# Patient Record
Sex: MALE | Race: WHITE | NOT HISPANIC OR LATINO | Employment: OTHER | URBAN - METROPOLITAN AREA
[De-identification: names, ages, dates, MRNs, and addresses within clinical notes are randomized per-mention and may not be internally consistent; named-entity substitution may affect disease eponyms.]

---

## 2017-02-08 ENCOUNTER — ALLSCRIPTS OFFICE VISIT (OUTPATIENT)
Dept: OTHER | Facility: OTHER | Age: 57
End: 2017-02-08

## 2017-02-08 DIAGNOSIS — R10.32 LEFT LOWER QUADRANT PAIN: ICD-10-CM

## 2017-02-08 DIAGNOSIS — R19.7 DIARRHEA: ICD-10-CM

## 2017-03-02 ENCOUNTER — ALLSCRIPTS OFFICE VISIT (OUTPATIENT)
Dept: OTHER | Facility: OTHER | Age: 57
End: 2017-03-02

## 2017-06-08 ENCOUNTER — ALLSCRIPTS OFFICE VISIT (OUTPATIENT)
Dept: OTHER | Facility: OTHER | Age: 57
End: 2017-06-08

## 2017-06-08 DIAGNOSIS — M54.9 DORSALGIA: ICD-10-CM

## 2017-06-08 DIAGNOSIS — R26.9 ABNORMALITY OF GAIT AND MOBILITY: ICD-10-CM

## 2017-06-08 DIAGNOSIS — E03.9 HYPOTHYROIDISM: ICD-10-CM

## 2017-06-08 DIAGNOSIS — E78.00 PURE HYPERCHOLESTEROLEMIA: ICD-10-CM

## 2017-07-16 ENCOUNTER — HOSPITAL ENCOUNTER (EMERGENCY)
Facility: HOSPITAL | Age: 57
Discharge: HOME | End: 2017-07-16
Admitting: EMERGENCY MEDICINE
Payer: COMMERCIAL

## 2017-07-16 VITALS
RESPIRATION RATE: 16 BRPM | WEIGHT: 200 LBS | HEART RATE: 68 BPM | SYSTOLIC BLOOD PRESSURE: 126 MMHG | TEMPERATURE: 99 F | OXYGEN SATURATION: 96 % | DIASTOLIC BLOOD PRESSURE: 74 MMHG

## 2017-07-16 DIAGNOSIS — L08.9 SKIN INFECTION: Primary | ICD-10-CM

## 2017-07-16 PROCEDURE — 99282 EMERGENCY DEPT VISIT SF MDM: CPT

## 2017-07-16 RX ORDER — CLINDAMYCIN HYDROCHLORIDE 150 MG/1
300 CAPSULE ORAL ONCE
Status: COMPLETED | OUTPATIENT
Start: 2017-07-16 | End: 2017-07-16

## 2017-07-16 RX ORDER — CLINDAMYCIN HYDROCHLORIDE 300 MG/1
300 CAPSULE ORAL 4 TIMES DAILY
Qty: 28 CAPSULE | Refills: 0 | Status: SHIPPED | OUTPATIENT
Start: 2017-07-16 | End: 2017-07-23

## 2017-07-16 RX ADMIN — CLINDAMYCIN HYDROCHLORIDE 300 MG: 150 CAPSULE ORAL at 00:31

## 2017-09-21 ENCOUNTER — GENERIC CONVERSION - ENCOUNTER (OUTPATIENT)
Dept: OTHER | Facility: OTHER | Age: 57
End: 2017-09-21

## 2017-10-19 ENCOUNTER — GENERIC CONVERSION - ENCOUNTER (OUTPATIENT)
Dept: OTHER | Facility: OTHER | Age: 57
End: 2017-10-19

## 2017-10-19 DIAGNOSIS — M54.9 DORSALGIA: ICD-10-CM

## 2017-10-19 DIAGNOSIS — R26.9 ABNORMALITY OF GAIT AND MOBILITY: ICD-10-CM

## 2017-10-19 DIAGNOSIS — R55 SYNCOPE AND COLLAPSE: ICD-10-CM

## 2017-10-19 DIAGNOSIS — R42 DIZZINESS AND GIDDINESS: ICD-10-CM

## 2018-01-14 VITALS
WEIGHT: 197.31 LBS | RESPIRATION RATE: 18 BRPM | HEART RATE: 72 BPM | DIASTOLIC BLOOD PRESSURE: 72 MMHG | TEMPERATURE: 96.6 F | BODY MASS INDEX: 31.71 KG/M2 | HEIGHT: 66 IN | SYSTOLIC BLOOD PRESSURE: 108 MMHG | OXYGEN SATURATION: 96 %

## 2018-01-14 VITALS
WEIGHT: 195 LBS | HEART RATE: 94 BPM | HEIGHT: 66 IN | BODY MASS INDEX: 31.34 KG/M2 | DIASTOLIC BLOOD PRESSURE: 82 MMHG | SYSTOLIC BLOOD PRESSURE: 128 MMHG | TEMPERATURE: 98.2 F | RESPIRATION RATE: 18 BRPM | OXYGEN SATURATION: 99 %

## 2018-01-16 NOTE — MISCELLANEOUS
FIRST WARNING     Dear Anya Portillo:     You have had _2_ No-Show appointments at our office (3/2/17 1PM, 9/20/17 2PM  )  A No-Show is defined as any patient who fails to arrive for a scheduled appointment without canceling the appointment prior to the scheduled time  A patient who has more than THREE No-Shows may be dismissed from an [address] [place] [address] practice  Please call in advance to cancel appointments  If you continue to No-Show for scheduled appointments, you may be dismissed from our practice  Thank  You  Usted ha tenido _2_  No-Show citas en nuestra oficina (3/2/17 1PM, 9/20/17 2PM )  Un No-Show se define sowmya cualquier paciente que no llega a leola deloris programada sin cancelar la deloris antes de la hora programada  Un paciente que tiene  más de MISAEL No-Show puede ser despedido de un [facility] práctica  Por favor llame con anticipación para cancelar citas  Si continúa la  "No-Show" para las citas programadas, usted puede ser despedido de nuestra práctica  Breanne

## 2018-01-22 VITALS
HEIGHT: 66 IN | DIASTOLIC BLOOD PRESSURE: 60 MMHG | SYSTOLIC BLOOD PRESSURE: 100 MMHG | BODY MASS INDEX: 31.72 KG/M2 | WEIGHT: 197.38 LBS | HEART RATE: 82 BPM | RESPIRATION RATE: 18 BRPM | OXYGEN SATURATION: 97 % | TEMPERATURE: 98.5 F

## 2018-01-24 NOTE — MISCELLANEOUS
Provider Comments  Provider Comments:   Patient no showed his 1PM appointment today 3/2/17      Signatures   Electronically signed by : GURWINDER Hines; Mar  2 2017  5:27PM EST                       (Author)

## 2018-02-13 NOTE — RESULT NOTES
Verified Results  (1) CBC/PLT/DIFF [id number] 11:54AM Radharafael Gustafsons Order Number: [id number]     Test Name Result Flag Reference   WBC COUNT 4 72 Thousand/uL  4 31-10 16   RBC COUNT 4 96 Million/uL  3 88-5 62   HEMOGLOBIN 15 1 g/dL  12 0-17 0   HEMATOCRIT 42 4 %  36 5-49 3   MCV 86 fL  82-98   MCH 30 4 pg  26 8-34 3   MCHC 35 6 g/dL  31 4-37 4   RDW 13 0 %  11 6-15 1   MPV 10 2 fL  8 9-12 7   PLATELET COUNT 560 Thousands/uL  149-390   nRBC AUTOMATED 0 /100 WBCs     NEUTROPHILS RELATIVE PERCENT 66 %  43-75   LYMPHOCYTES RELATIVE PERCENT 23 %  14-44   MONOCYTES RELATIVE PERCENT 6 %  4-12   EOSINOPHILS RELATIVE PERCENT 5 %  0-6   BASOPHILS RELATIVE PERCENT 0 %  0-1   NEUTROPHILS ABSOLUTE COUNT 3 14 Thousands/?L  1 85-7 62   LYMPHOCYTES ABSOLUTE COUNT 1 06 Thousands/?L  0 60-4 47   MONOCYTES ABSOLUTE COUNT 0 29 Thousand/?L  0 17-1 22   EOSINOPHILS ABSOLUTE COUNT 0 22 Thousand/?L  0 00-0 61   BASOPHILS ABSOLUTE COUNT 0 01 Thousands/?L  0 00-0 10   - Patient Instructions: This bloodwork is non-fasting  Please drink two glasses of water morning of bloodwork  - Patient Instructions: This bloodwork is non-fasting  Please drink two glasses of water morning of bloodwork  (1) COMPREHENSIVE METABOLIC PANEL [id number] 52:41CH Radha Agustin Order Number: [id number]     Test Name Result Flag Reference   GLUCOSE,RANDM 105 mg/dL     If the patient is fasting, the ADA then defines impaired fasting glucose as > 100 mg/dL and diabetes as > or equal to 123 mg/dL     SODIUM 139 mmol/L  136-145   POTASSIUM 3 8 mmol/L  3 5-5 3   CHLORIDE 102 mmol/L  100-108   CARBON DIOXIDE 28 mmol/L  21-32   ANION GAP (CALC) 9 mmol/L  4-13   BLOOD UREA NITROGEN 14 mg/dL  5-25   CREATININE 1 21 mg/dL  0 60-1 30   Standardized to IDMS reference method   CALCIUM 8 9 mg/dL  8 3-10 1   BILI, TOTAL 1 12 mg/dL H 0 20-1 00   ALK PHOSPHATAS 104 U/L     ALT (SGPT) 35 U/L  12-78   AST(SGOT) 23 U/L  5-45   ALBUMIN 4 0 g/dL  3 5-5 0   TOTAL PROTEIN 7 5 g/dL  6 4-8 2   eGFR Non-African American      >60 0 ml/min/1 73sq m   - Patient Instructions: This is a fasting blood test  Water,black tea or black  coffee only after 9:00pm the night before test Drink 2 glasses of water the morning of test   National Kidney Disease Education Program recommendations are as follows:  GFR calculation is accurate only with a steady state creatinine  Chronic Kidney disease less than 60 ml/min/1 73 sq  meters  Kidney failure less than 15 ml/min/1 73 sq  meters  (1) LIPID PANEL, FASTING [id number] 11:54AM Jett Panning Order Number: [id number]     Test Name Result Flag Reference   CHOLESTEROL 128 mg/dL     HDL,DIRECT 29 mg/dL L 40-60   Specimen collection should occur prior to Metamizole administration due to the potential for falsely depressed results  LDL CHOLESTEROL CALCULATED 74 mg/dL  0-100   - Patient Instructions: This is a fasting blood test  Water,black tea or black  coffee only after 9:00pm the night before test   Drink 2 glasses of water the morning of test     - Patient Instructions: This is a fasting blood test  Water,black tea or black  coffee only after 9:00pm the night before test Drink 2 glasses of water the morning of test   Triglyceride:         Normal              <150 mg/dl       Borderline High    150-199 mg/dl       High               200-499 mg/dl       Very High          >499 mg/dl  Cholesterol:         Desirable        <200 mg/dl      Borderline High  200-239 mg/dl      High             >239 mg/dl  HDL Cholesterol:        High    >59 mg/dL      Low     <41 mg/dL  LDL CALCULATED:    This screening LDL is a calculated result  It does not have the accuracy of the Direct Measured LDL in the monitoring of patients with hyperlipidemia and/or statin therapy  Direct Measure LDL (ZHD372) must be ordered separately in these patients     TRIGLYCERIDES 126 mg/dL  <=150   Specimen collection should occur prior to N-Acetylcysteine or Metamizole administration due to the potential for falsely depressed results  (1) URINALYSIS w URINE C/S REFLEX (will reflex a microscopy if leukocytes, occult blood, or nitrites are not within normal limits) 14Sep2016 11:54AM [facility] Order Number: [id number]     Test Name Result Flag Reference   COLOR Yellow     CLARITY Slightly Cloudy     PH UA 8 0  4 5-8 0   LEUKOCYTE ESTERASE UA Negative  Negative   NITRITE UA Negative  Negative   PROTEIN UA 30 (1+) mg/dl A Negative   GLUCOSE UA Negative mg/dl  Negative   KETONES UA Negative mg/dl  Negative   UROBILINOGEN UA 1 0 E U /dl  0 2, 1 0 E U /dl   BILIRUBIN UA Negative  Negative   BLOOD UA Negative  Negative, Trace-Intact   SPECIFIC GRAVITY UA 1 015  1 003-1 030   BACTERIA Occasional /hpf  None Seen, Occasional   EPITHELIAL CELLS None Seen /hpf  None Seen, Occasional   RBC UA 0-1 /hpf A None Seen   WBC UA 0-1 /hpf A None Seen   MUCOUS THREADS Occasional  Occasional, Moderate, Innumerable     (1) HEMOGLOBIN A1C 14Sep2016 11:54AM [facility] Order Number: [id number]     Test Name Result Flag Reference   HEMOGLOBIN A1C 5 6 %  4 2-6 3   EST  AVG  GLUCOSE 114 mg/dl       (1) MICROALBUMIN CREATININE RATIO, RANDOM URINE 14Sep2016 11:54AM [facility] Order Number: [id number]     Test Name Result Flag Reference   MICROALBUMIN/ CREAT R 14 mg/g creatinine  0-30   MICROALBUMIN,URINE 43 8 mg/L H 0 0-20 0   CREATININE URINE 314 0 mg/dL       (1) TSH WITH FT4 REFLEX 14Sep2016 11:54AM [facility] Order Number: [id number]     Test Name Result Flag Reference   TSH 2 820 uIU/mL  0 358-3 740   - Patient Instructions:  This is a fasting blood test  Water,black tea or black  coffee only after 9:00pm the night before test Drink 2 glasses of water the morning of test   Patients undergoing fluorescein dye angiography may retain small amounts of fluorescein in the body for 48-72 hours post procedure  Samples containing fluorescein can produce falsely depressed TSH values  If the patient had this procedure,a specimen should be resubmitted post fluorescein clearance  * XR SPINE LUMBAR MINIMUM 4 VIEWS NON INJURY [id number] 11:10AM Tu Wright Order Number: [id number]     Test Name Result Flag Reference   XR SPINE LUMBAR MINIMUM 4 VIEWS (Report)     LUMBAR SPINE     INDICATION: pain across lower back (worse in right side) radiates down both legs for several weeks     COMPARISON: None     VIEWS: AP, lateral and bilateral oblique projections; 4 images     FINDINGS:     Mild levolumbar scoliosis  Moderate diffuse endplate degenerative changes with anterior bridging osteophytes at L1-2, L3-4 and L4-5  Mild/moderate diffuse facet arthropathy  No acute fracture       IMPRESSION:     Moderate diffuse degenerative changes         Workstation performed: [id number]     Signed by:   Isadora Sheets MD   9/14/16       Plan  Hypercholesterolemia    · Aspirin 81 MG Oral Tablet Delayed Release; TAKE 1 TABLET DAILY   · Atorvastatin Calcium 10 MG Oral Tablet; TAKE 1 TABLET DAILY

## 2018-04-04 ENCOUNTER — OFFICE VISIT (OUTPATIENT)
Dept: FAMILY MEDICINE CLINIC | Facility: CLINIC | Age: 58
End: 2018-04-04
Payer: COMMERCIAL

## 2018-04-04 VITALS
TEMPERATURE: 98.6 F | WEIGHT: 199 LBS | RESPIRATION RATE: 20 BRPM | SYSTOLIC BLOOD PRESSURE: 100 MMHG | BODY MASS INDEX: 31.98 KG/M2 | HEART RATE: 70 BPM | HEIGHT: 66 IN | DIASTOLIC BLOOD PRESSURE: 60 MMHG | OXYGEN SATURATION: 98 %

## 2018-04-04 DIAGNOSIS — R41.3 MEMORY DIFFICULTY: Primary | ICD-10-CM

## 2018-04-04 DIAGNOSIS — M25.561 CHRONIC PAIN OF RIGHT KNEE: ICD-10-CM

## 2018-04-04 DIAGNOSIS — R26.9 GAIT ABNORMALITY: ICD-10-CM

## 2018-04-04 DIAGNOSIS — G89.29 CHRONIC PAIN OF RIGHT KNEE: ICD-10-CM

## 2018-04-04 PROCEDURE — 3008F BODY MASS INDEX DOCD: CPT | Performed by: PHYSICIAN ASSISTANT

## 2018-04-04 PROCEDURE — 99214 OFFICE O/P EST MOD 30 MIN: CPT | Performed by: PHYSICIAN ASSISTANT

## 2018-04-04 RX ORDER — DICLOFENAC SODIUM 25 MG/1
TABLET, DELAYED RELEASE ORAL
Refills: 2 | COMMUNITY
Start: 2018-03-09 | End: 2018-04-04 | Stop reason: SDUPTHER

## 2018-04-04 RX ORDER — GABAPENTIN 300 MG/1
300 CAPSULE ORAL 3 TIMES DAILY
Qty: 90 CAPSULE | Refills: 2 | Status: SHIPPED | OUTPATIENT
Start: 2018-04-04 | End: 2018-10-02 | Stop reason: SDUPTHER

## 2018-04-04 RX ORDER — DICLOFENAC SODIUM 25 MG/1
25 TABLET, DELAYED RELEASE ORAL 3 TIMES DAILY
Qty: 90 TABLET | Refills: 2 | Status: SHIPPED | OUTPATIENT
Start: 2018-04-04 | End: 2018-10-02 | Stop reason: SDUPTHER

## 2018-04-04 RX ORDER — GABAPENTIN 300 MG/1
1 CAPSULE ORAL 3 TIMES DAILY
COMMUNITY
Start: 2016-09-07 | End: 2018-04-04 | Stop reason: SDUPTHER

## 2018-04-04 NOTE — ASSESSMENT & PLAN NOTE
For knee pain, since he was previously on diclofenac, will refill the medication  Discussed stretches and warm compresses to help with symptoms  If there is no improvement in 1 month, make follow-up appointment

## 2018-04-04 NOTE — ASSESSMENT & PLAN NOTE
No improvement in gait since patient was last seen  Never got lab work or imaging completed  Will reorder testing  May refer to PT in the future

## 2018-04-04 NOTE — ASSESSMENT & PLAN NOTE
Discussed with patient possible causes of memory deficiencies  Informed him the importance of getting lab work completed  With concerns of memory difficulties and gait instability will order MRI for further evaluation  Pending results may start on medications or refer to Neurology

## 2018-04-04 NOTE — PROGRESS NOTES
Assessment/Plan:    Gait abnormality  No improvement in gait since patient was last seen  Never got lab work or imaging completed  Will reorder testing  May refer to PT in the future  Memory difficulty  Discussed with patient possible causes of memory deficiencies  Informed him the importance of getting lab work completed  With concerns of memory difficulties and gait instability will order MRI for further evaluation  Pending results may start on medications or refer to Neurology  Chronic pain of right knee  For knee pain, since he was previously on diclofenac, will refill the medication  Discussed stretches and warm compresses to help with symptoms  If there is no improvement in 1 month, make follow-up appointment  Diagnoses and all orders for this visit:    Memory difficulty  -     CBC and differential; Future  -     Comprehensive metabolic panel; Future  -     Lipid panel; Future  -     Urinalysis with reflex to microscopic; Future  -     Vitamin B12; Future  -     Folate; Future  -     TSH, 3rd generation with T4 reflex; Future  -     RPR; Future  -     Sedimentation rate, automated; Future  -     Lyme Antibody Profile with reflex to WB; Future  -     Magnesium; Future  -     MRI brain for memory loss; Future    Gait abnormality  -     MRI brain for memory loss; Future    Chronic pain of right knee  -     diclofenac (VOLTAREN) 25 MG EC tablet; Take 1 tablet (25 mg total) by mouth 3 (three) times a day  -     gabapentin (NEURONTIN) 300 mg capsule; Take 1 capsule (300 mg total) by mouth 3 (three) times a day    Other orders  -     Discontinue: diclofenac (VOLTAREN) 25 MG EC tablet; TAKE 1 TABLET 3 TIMES DAILY BY MOUTH  -     Discontinue: gabapentin (NEURONTIN) 300 mg capsule; Take 1 capsule by mouth 3 (three) times a day          Subjective:      Patient ID: Carolyn Chavez is a 56 y o  male  Patient presents today with a 4 month complaint of increasing memory loss   Patient was seen in the office in September for similar symptoms and blood work and imaging were ordered however patient states that he "forgot" to get them done  He states that he has been forgetting things like where he put his keys and items he was supposed to get from the store but denies forgeting older information about the past  He denies any fever, recent illness, change in medications, alcohol or drug use or incontinence  At last visit, patient admitted to a "staggering" walk and said he feels off balance when walking and states that he continues to experience these symptoms  He also admits at this visit to some right sided joint pain in both the right shoulder and right knee  He has not tried anything for this pain  He denies swelling of his joints, facial droop, unilateral weakness or numbness, slurred speech or chest pain  Patient denies any other concerns at this time  The following portions of the patient's history were reviewed and updated as appropriate:   He  has a past medical history of Arthritis; Chronic pain; CVA (cerebral vascular accident) ([facility] [address] ); Hyperlipidemia; Malignant neoplasm of lung ([facility] [address] ); Myocardial infarction; and Psychiatric disorder  He   Patient Active Problem List    Diagnosis Date Noted    Memory difficulty 04/04/2018    Chronic pain of right knee 04/04/2018    Gait abnormality 09/22/2016     He  has a past surgical history that includes Elbow surgery (Right) and Gallbladder surgery  His family history includes Other in his family and father  He  reports that he has quit smoking  He has never used smokeless tobacco  He reports that he does not drink alcohol or use drugs    Current Outpatient Prescriptions   Medication Sig Dispense Refill    gabapentin (NEURONTIN) 300 mg capsule Take 1 capsule (300 mg total) by mouth 3 (three) times a day 90 capsule 2    cyclobenzaprine (FLEXERIL) 10 mg tablet Take 1 tablet by mouth 3 (three) times a day as needed for muscle spasms 3 tablet 0    diclofenac (VOLTAREN) 25 MG EC tablet Take 1 tablet (25 mg total) by mouth 3 (three) times a day 90 tablet 2     No current facility-administered medications for this visit  He is allergic to sulfamethoxazole-trimethoprim and amoxicillin       Review of Systems   Constitutional: Negative for chills, fatigue and fever  Respiratory: Positive for cough (Dry Chronic )  Negative for shortness of breath  Cardiovascular: Negative for chest pain and palpitations  Gastrointestinal: Negative for abdominal pain, blood in stool, constipation, nausea and vomiting  Genitourinary: Negative for difficulty urinating, dysuria and frequency  Denied incontinence    Musculoskeletal: Positive for arthralgias (Right shoulder and right knee)  Skin: Negative for color change  Neurological: Negative for tremors, syncope, speech difficulty, weakness, numbness and headaches  Psychiatric/Behavioral: Positive for decreased concentration  Objective:      /60   Pulse 70   Temp 98 6 °F (37 °C) (Tympanic)   Resp 20   Ht 5' 6" (1 676 m)   Wt 90 3 kg (199 lb)   SpO2 98%   BMI 32 12 kg/m²          Physical Exam   Constitutional: He is oriented to person, place, and time  He appears well-developed and well-nourished  No distress  HENT:   Head: Normocephalic and atraumatic  Mouth/Throat: Oropharynx is clear and moist    Eyes: Conjunctivae are normal  Pupils are equal, round, and reactive to light  Neck: Normal range of motion  Neck supple  Cardiovascular: Normal rate, regular rhythm and normal heart sounds  Exam reveals no friction rub  No murmur heard  Pulmonary/Chest: Effort normal and breath sounds normal  No respiratory distress  He has no wheezes  Abdominal: Soft  Bowel sounds are normal  He exhibits no distension  There is no tenderness  Musculoskeletal: Normal range of motion  Lymphadenopathy:     He has no cervical adenopathy     Neurological: He is alert and oriented to person, place, and time  Gait (walks with cane) abnormal    Skin: Skin is warm and dry  He is not diaphoretic  Psychiatric: He has a normal mood and affect   His behavior is normal  Thought content normal    Patient was given 3 words to remember at beginning of exam and was able to remember 0/3 with prompts at conclusion of exam

## 2018-04-05 LAB
ABSOL LYMPHOCYTES (HISTORICAL): 1 K/UL (ref 0.5–4)
ALBUMIN SERPL BCP-MCNC: 4.8 G/DL (ref 3–5.2)
ALP SERPL-CCNC: 89 U/L (ref 43–122)
ALT SERPL W P-5'-P-CCNC: 61 U/L (ref 9–52)
ANION GAP SERPL CALCULATED.3IONS-SCNC: 12 MMOL/L (ref 5–14)
AST SERPL W P-5'-P-CCNC: 37 U/L (ref 17–59)
BASOPHILS # BLD AUTO: 0 K/UL (ref 0–0.1)
BASOPHILS # BLD AUTO: 1 % (ref 0–1)
BILIRUB SERPL-MCNC: 1.2 MG/DL
BILIRUB UR QL STRIP: NEGATIVE MG/DL
BUN SERPL-MCNC: 15 MG/DL (ref 5–25)
CALCIUM SERPL-MCNC: 9.8 MG/DL (ref 8.4–10.2)
CHLORIDE SERPL-SCNC: 101 MEQ/L (ref 97–108)
CHOLEST SERPL-MCNC: 135 MG/DL
CHOLEST/HDLC SERPL: 3.4 {RATIO}
CLARITY UR: CLEAR
CO2 SERPL-SCNC: 29 MMOL/L (ref 22–30)
COLOR UR: YELLOW
CREATINE, SERUM (HISTORICAL): 0.95 MG/DL (ref 0.7–1.5)
DEPRECATED RDW RBC AUTO: 13.3 %
EGFR (HISTORICAL): >60 ML/MIN/1.73 M2
EOSINOPHIL # BLD AUTO: 0.2 K/UL (ref 0–0.4)
EOSINOPHIL NFR BLD AUTO: 4 % (ref 0–6)
ERYTHROCYTE SEDIMENTATION RATE (HISTORICAL): 3 MM (ref 1–20)
FOLATE SERPL-MCNC: 8.2 NG/ML
GLUCOSE FASTING (HISTORICAL): 143 MG/DL (ref 70–99)
GLUCOSE UR STRIP-MCNC: NEGATIVE MG/DL
HCT VFR BLD AUTO: 45.6 % (ref 41–53)
HDLC SERPL-MCNC: 40 MG/DL
HGB BLD-MCNC: 15.8 G/DL (ref 13.5–17.5)
HGB UR QL STRIP.AUTO: NEGATIVE
KETONES UR STRIP-MCNC: 5 MG/DL
LDL/HDL RATIO (HISTORICAL): 1.9
LDLC SERPL CALC-MCNC: 74 MG/DL
LEUKOCYTE ESTERASE UR QL STRIP: NEGATIVE
LYMPHOCYTES NFR BLD AUTO: 24 % (ref 25–45)
MAGNESIUM SERPL-MCNC: 1.9 MG/DL (ref 1.6–2.3)
MCH RBC QN AUTO: 30 PG (ref 26–34)
MCHC RBC AUTO-ENTMCNC: 34.6 % (ref 31–36)
MCV RBC AUTO: 87 FL (ref 80–100)
MONOCYTES # BLD AUTO: 0.3 K/UL (ref 0.2–0.9)
MONOCYTES NFR BLD AUTO: 7 % (ref 1–10)
NEUTROPHILS ABS COUNT (HISTORICAL): 2.6 K/UL (ref 1.8–7.8)
NEUTS SEG NFR BLD AUTO: 64 % (ref 45–65)
NITRITE UR QL STRIP: NEGATIVE
PH UR STRIP.AUTO: 6 [PH] (ref 4.5–8)
PLATELET # BLD AUTO: 165 K/MCL (ref 150–450)
POTASSIUM SERPL-SCNC: 4 MEQ/L (ref 3.6–5)
PROT UR STRIP-MCNC: NEGATIVE MG/DL
RBC # BLD AUTO: 5.26 M/MCL (ref 4.5–5.9)
RPR SCREEN (HISTORICAL): NORMAL
SODIUM SERPL-SCNC: 142 MEQ/L (ref 137–147)
SP GR UR STRIP.AUTO: 1.01 (ref 1–1.04)
TOTAL PROTEIN (HISTORICAL): 7.7 G/DL (ref 5.9–8.4)
TRIGL SERPL-MCNC: 103 MG/DL
TSH SERPL DL<=0.05 MIU/L-ACNC: 2.77 UIU/ML (ref 0.47–4.68)
UROBILINOGEN UR QL STRIP.AUTO: NEGATIVE MG/DL (ref 0–1)
VIT B12 SERPL-MCNC: 330 PG/ML (ref 239–931)
VLDLC SERPL CALC-MCNC: 21 MG/DL (ref 0–40)
WBC # BLD AUTO: 4.1 K/MCL (ref 4.5–11)

## 2018-04-06 LAB — B BURGDORFERI AB,IGM/WB (HISTORICAL): 0.4

## 2018-04-10 ENCOUNTER — TELEPHONE (OUTPATIENT)
Dept: NEUROLOGY | Facility: CLINIC | Age: 58
End: 2018-04-10

## 2018-04-10 DIAGNOSIS — R41.3 MEMORY DIFFICULTY: Primary | ICD-10-CM

## 2018-04-18 ENCOUNTER — TELEPHONE (OUTPATIENT)
Dept: FAMILY MEDICINE CLINIC | Facility: CLINIC | Age: 58
End: 2018-04-18

## 2018-04-18 DIAGNOSIS — R73.01 ELEVATED FASTING GLUCOSE: Primary | ICD-10-CM

## 2018-04-18 NOTE — TELEPHONE ENCOUNTER
----- Message from Fuentes Alfonso PA-C sent at 4/18/2018  7:46 AM EDT -----  Received paper copy of patient's lab results  Lab work was normal with the exception of fasting glucose which was at 143  This would have concerns for diabetes  Will order A1c for further evaluation

## 2018-09-12 ENCOUNTER — APPOINTMENT (OUTPATIENT)
Dept: LAB | Facility: HOSPITAL | Age: 58
End: 2018-09-12
Payer: COMMERCIAL

## 2018-09-12 ENCOUNTER — TRANSCRIBE ORDERS (OUTPATIENT)
Dept: ADMINISTRATIVE | Facility: HOSPITAL | Age: 58
End: 2018-09-12

## 2018-09-12 DIAGNOSIS — F25.0 SCHIZOAFFECTIVE DISORDER, BIPOLAR TYPE (HCC): ICD-10-CM

## 2018-09-12 DIAGNOSIS — Z79.899 NEED FOR PROPHYLACTIC CHEMOTHERAPY: Primary | ICD-10-CM

## 2018-09-12 DIAGNOSIS — Z79.899 NEED FOR PROPHYLACTIC CHEMOTHERAPY: ICD-10-CM

## 2018-09-12 LAB
ALBUMIN SERPL BCP-MCNC: 3.9 G/DL (ref 3–5.2)
ALP SERPL-CCNC: 68 U/L (ref 43–122)
ALT SERPL W P-5'-P-CCNC: 57 U/L (ref 9–52)
ANION GAP SERPL CALCULATED.3IONS-SCNC: 8 MMOL/L (ref 5–14)
AST SERPL W P-5'-P-CCNC: 46 U/L (ref 17–59)
BASOPHILS # BLD AUTO: 0 THOUSANDS/ΜL (ref 0–0.1)
BASOPHILS NFR BLD AUTO: 1 % (ref 0–1)
BILIRUB SERPL-MCNC: 0.8 MG/DL
BUN SERPL-MCNC: 7 MG/DL (ref 5–25)
CALCIUM SERPL-MCNC: 8.7 MG/DL (ref 8.4–10.2)
CHLORIDE SERPL-SCNC: 103 MMOL/L (ref 97–108)
CHOLEST SERPL-MCNC: 104 MG/DL
CO2 SERPL-SCNC: 30 MMOL/L (ref 22–30)
CREAT SERPL-MCNC: 0.94 MG/DL (ref 0.7–1.5)
EOSINOPHIL # BLD AUTO: 0.3 THOUSAND/ΜL (ref 0–0.4)
EOSINOPHIL NFR BLD AUTO: 7 % (ref 0–6)
ERYTHROCYTE [DISTWIDTH] IN BLOOD BY AUTOMATED COUNT: 12.7 %
GFR SERPL CREATININE-BSD FRML MDRD: 90 ML/MIN/1.73SQ M
GLUCOSE P FAST SERPL-MCNC: 146 MG/DL (ref 70–99)
HCT VFR BLD AUTO: 42.1 % (ref 41–53)
HDLC SERPL-MCNC: 23 MG/DL (ref 40–59)
HGB BLD-MCNC: 14.1 G/DL (ref 13.5–17.5)
LDLC SERPL CALC-MCNC: 49 MG/DL
LYMPHOCYTES # BLD AUTO: 1 THOUSANDS/ΜL (ref 0.5–4)
LYMPHOCYTES NFR BLD AUTO: 28 % (ref 20–50)
MCH RBC QN AUTO: 30.2 PG (ref 26–34)
MCHC RBC AUTO-ENTMCNC: 33.4 G/DL (ref 31–36)
MCV RBC AUTO: 90 FL (ref 80–100)
MONOCYTES # BLD AUTO: 0.2 THOUSAND/ΜL (ref 0.2–0.9)
MONOCYTES NFR BLD AUTO: 6 % (ref 1–10)
NEUTROPHILS # BLD AUTO: 2 THOUSANDS/ΜL (ref 1.8–7.8)
NEUTS SEG NFR BLD AUTO: 58 % (ref 45–65)
NONHDLC SERPL-MCNC: 81 MG/DL
PLATELET # BLD AUTO: 163 THOUSANDS/UL (ref 150–450)
PMV BLD AUTO: 8.1 FL (ref 8.9–12.7)
POTASSIUM SERPL-SCNC: 4.2 MMOL/L (ref 3.6–5)
PROT SERPL-MCNC: 7.1 G/DL (ref 5.9–8.4)
RBC # BLD AUTO: 4.66 MILLION/UL (ref 4.5–5.9)
SODIUM SERPL-SCNC: 141 MMOL/L (ref 137–147)
TRIGL SERPL-MCNC: 158 MG/DL
TSH SERPL DL<=0.05 MIU/L-ACNC: 2.43 UIU/ML (ref 0.47–4.68)
WBC # BLD AUTO: 3.5 THOUSAND/UL (ref 4.5–11)

## 2018-09-12 PROCEDURE — 80061 LIPID PANEL: CPT

## 2018-09-12 PROCEDURE — 85025 COMPLETE CBC W/AUTO DIFF WBC: CPT

## 2018-09-12 PROCEDURE — 80053 COMPREHEN METABOLIC PANEL: CPT

## 2018-09-12 PROCEDURE — 84443 ASSAY THYROID STIM HORMONE: CPT

## 2018-09-12 PROCEDURE — 36415 COLL VENOUS BLD VENIPUNCTURE: CPT

## 2018-10-02 ENCOUNTER — OFFICE VISIT (OUTPATIENT)
Dept: FAMILY MEDICINE CLINIC | Facility: CLINIC | Age: 58
End: 2018-10-02
Payer: COMMERCIAL

## 2018-10-02 VITALS
HEIGHT: 66 IN | SYSTOLIC BLOOD PRESSURE: 110 MMHG | WEIGHT: 196 LBS | DIASTOLIC BLOOD PRESSURE: 68 MMHG | TEMPERATURE: 97.2 F | OXYGEN SATURATION: 98 % | RESPIRATION RATE: 20 BRPM | BODY MASS INDEX: 31.5 KG/M2 | HEART RATE: 64 BPM

## 2018-10-02 DIAGNOSIS — E11.9 TYPE 2 DIABETES MELLITUS WITHOUT COMPLICATION, WITHOUT LONG-TERM CURRENT USE OF INSULIN (HCC): ICD-10-CM

## 2018-10-02 DIAGNOSIS — G89.29 CHRONIC PAIN OF RIGHT KNEE: ICD-10-CM

## 2018-10-02 DIAGNOSIS — M54.42 CHRONIC BILATERAL LOW BACK PAIN WITH BILATERAL SCIATICA: ICD-10-CM

## 2018-10-02 DIAGNOSIS — G89.29 CHRONIC BILATERAL LOW BACK PAIN WITH BILATERAL SCIATICA: ICD-10-CM

## 2018-10-02 DIAGNOSIS — Z86.73 HISTORY OF CVA (CEREBROVASCULAR ACCIDENT): ICD-10-CM

## 2018-10-02 DIAGNOSIS — Z12.11 SCREENING FOR COLON CANCER: Primary | ICD-10-CM

## 2018-10-02 DIAGNOSIS — R26.9 GAIT ABNORMALITY: ICD-10-CM

## 2018-10-02 DIAGNOSIS — M25.561 CHRONIC PAIN OF RIGHT KNEE: ICD-10-CM

## 2018-10-02 DIAGNOSIS — R41.3 MEMORY DIFFICULTY: ICD-10-CM

## 2018-10-02 DIAGNOSIS — M54.41 CHRONIC BILATERAL LOW BACK PAIN WITH BILATERAL SCIATICA: ICD-10-CM

## 2018-10-02 LAB — SL AMB POCT HEMOGLOBIN AIC: 7.1

## 2018-10-02 PROCEDURE — 3045F PR MOST RECENT HEMOGLOBIN A1C LEVEL 7.0-9.0%: CPT | Performed by: PHYSICIAN ASSISTANT

## 2018-10-02 PROCEDURE — 83036 HEMOGLOBIN GLYCOSYLATED A1C: CPT | Performed by: PHYSICIAN ASSISTANT

## 2018-10-02 PROCEDURE — 99213 OFFICE O/P EST LOW 20 MIN: CPT | Performed by: PHYSICIAN ASSISTANT

## 2018-10-02 PROCEDURE — 99051 MED SERV EVE/WKEND/HOLIDAY: CPT | Performed by: PHYSICIAN ASSISTANT

## 2018-10-02 RX ORDER — GABAPENTIN 300 MG/1
300 CAPSULE ORAL 3 TIMES DAILY
Qty: 90 CAPSULE | Refills: 2 | Status: SHIPPED | OUTPATIENT
Start: 2018-10-02 | End: 2018-12-28 | Stop reason: SDUPTHER

## 2018-10-02 RX ORDER — CYCLOBENZAPRINE HCL 10 MG
10 TABLET ORAL 3 TIMES DAILY PRN
Qty: 90 TABLET | Refills: 2 | Status: SHIPPED | OUTPATIENT
Start: 2018-10-02 | End: 2018-12-28 | Stop reason: SDUPTHER

## 2018-10-02 RX ORDER — DICLOFENAC SODIUM 25 MG/1
25 TABLET, DELAYED RELEASE ORAL 3 TIMES DAILY
Qty: 90 TABLET | Refills: 2 | Status: SHIPPED | OUTPATIENT
Start: 2018-10-02 | End: 2018-12-28 | Stop reason: SDUPTHER

## 2018-10-02 NOTE — PROGRESS NOTES
Assessment/Plan:    Type 2 diabetes mellitus without complication, without long-term current use of insulin (HCC)  Lab Results   Component Value Date    HGBA1C 7 1 10/02/2018       No results for input(s): POCGLU in the last 72 hours  Blood Sugar Average: Last 72 hrs:   A1c was obtained today which was 7 1%  At this time will restart patient on metformin 500 mg twice a day  Discussed with patient the importance of diet and exercise to control diabetes, and if able to we may be able to decrease medication once again  Memory difficulty  Lab work came back normal with the exception of elevated glucose which was related to diabetes  MRI was never completed  Patient does have a history of previous stroke  Will refer once again to Neurology with concerns of memory difficulty  Chronic bilateral low back pain with bilateral sciatica  Stable  Will continue with diclofenac, gabapentin, Flexeril  Diagnoses and all orders for this visit:    Screening for colon cancer    Memory difficulty  -     Ambulatory referral to Neurology; Future    Gait abnormality  -     Ambulatory referral to Neurology; Future    Type 2 diabetes mellitus without complication, without long-term current use of insulin (HCC)  -     POCT hemoglobin A1c  -     metFORMIN (GLUCOPHAGE) 500 mg tablet; Take 1 tablet (500 mg total) by mouth 2 (two) times a day with meals    History of CVA (cerebrovascular accident)    Chronic pain of right knee  -     gabapentin (NEURONTIN) 300 mg capsule; Take 1 capsule (300 mg total) by mouth 3 (three) times a day  -     diclofenac (VOLTAREN) 25 MG EC tablet; Take 1 tablet (25 mg total) by mouth 3 (three) times a day    Chronic bilateral low back pain with bilateral sciatica  -     cyclobenzaprine (FLEXERIL) 10 mg tablet; Take 1 tablet (10 mg total) by mouth 3 (three) times a day as needed for muscle spasms    Other orders  -     Cancel: Ambulatory referral to Gastroenterology;  Future          Subjective: Patient ID: Dee Clark is a 66 y o  male  43-year-old male presenting for routine follow-up  Patient states that he was told that the something wrong with his recent lab work  Was told that he had elevated blood sugar  Patient has been diagnosed with diabetes in the past   States that he was on medication, but was taken off medication once his A1c had improved  Has not been really paying attention to his diet to control his diabetes  Currently denies any headaches, dizziness, blurry vision,, chest pain, palpitations, numbness tingling hands or feet  Patient is also following up for chronic back pain  Has been started on diclofenac, gabapentin, Flexeril  States that the medications has been helping with his back pain  Pain is mainly located in the lumbar spine  Does have occasional episodes of radiculopathy, which she states may happen once or twice a week  Denies any numbness, tingling, weakness in lower extremities  Has not had any recent falls  Patient also has concerns with memory difficulties  Did have lab work completed, but never got the MRI completed  Patient never made a follow-up appointment with Neurology  States he does  About important dates, appointments, or where he placed things  No concerns with unable to recognize faces  Has not had problems with erections  The following portions of the patient's history were reviewed and updated as appropriate:   He  has a past medical history of Arthritis; Chronic pain; CVA (cerebral vascular accident) ([facility] [address] ); Hyperlipidemia; Malignant neoplasm of lung ([facility] [address] ); Myocardial infarction [facility]); and Psychiatric disorder    He   Patient Active Problem List    Diagnosis Date Noted    Type 2 diabetes mellitus without complication, without long-term current use of insulin ([facility] [address] ) 10/02/2018    Chronic bilateral low back pain with bilateral sciatica 10/02/2018    Memory difficulty 04/04/2018    Chronic pain of right knee 04/04/2018    Gait abnormality 09/22/2016     He  has a past surgical history that includes Elbow surgery (Right) and Gallbladder surgery  His family history includes Other in his family and father  He  reports that he has quit smoking  He has never used smokeless tobacco  He reports that he does not drink alcohol or use drugs  Current Outpatient Prescriptions   Medication Sig Dispense Refill    cyclobenzaprine (FLEXERIL) 10 mg tablet Take 1 tablet (10 mg total) by mouth 3 (three) times a day as needed for muscle spasms 90 tablet 2    diclofenac (VOLTAREN) 25 MG EC tablet Take 1 tablet (25 mg total) by mouth 3 (three) times a day 90 tablet 2    gabapentin (NEURONTIN) 300 mg capsule Take 1 capsule (300 mg total) by mouth 3 (three) times a day 90 capsule 2    metFORMIN (GLUCOPHAGE) 500 mg tablet Take 1 tablet (500 mg total) by mouth 2 (two) times a day with meals 180 tablet 0     No current facility-administered medications for this visit  He is allergic to sulfamethoxazole-trimethoprim and amoxicillin       Review of Systems   Constitutional: Negative for activity change, appetite change, chills, fatigue, fever and unexpected weight change  Eyes: Negative for pain and visual disturbance  Respiratory: Negative for chest tightness and shortness of breath  Cardiovascular: Negative for chest pain, palpitations and leg swelling  Gastrointestinal: Negative for abdominal pain, diarrhea, nausea and vomiting  Endocrine: Negative for polydipsia, polyphagia and polyuria  Genitourinary: Negative for frequency and urgency  Musculoskeletal: Positive for back pain and gait problem  Skin: Negative for rash and wound  Neurological: Negative for dizziness, syncope, weakness, numbness and headaches  Psychiatric/Behavioral: Negative for dysphoric mood and sleep disturbance  The patient is not nervous/anxious            Objective:      /68   Pulse 64   Temp (!) 97 2 °F (36 2 °C) (Tympanic)   Resp 20   Ht 5' 6" (1 676 m)   Wt 88 9 kg (196 lb)   SpO2 98%   BMI 31 64 kg/m²          Physical Exam   Constitutional: He is oriented to person, place, and time  He appears well-developed and well-nourished  No distress  Neck: Normal range of motion  Neck supple  Cardiovascular: Normal rate, regular rhythm and normal heart sounds  Exam reveals no gallop and no friction rub  Pulses are no weak pulses  No murmur heard  Pulses:       Dorsalis pedis pulses are 2+ on the right side, and 2+ on the left side  Posterior tibial pulses are 2+ on the right side, and 2+ on the left side  Pulmonary/Chest: Effort normal and breath sounds normal  No respiratory distress  He has no wheezes  He has no rales  Abdominal: Soft  Bowel sounds are normal  He exhibits no distension and no mass  There is no tenderness  There is no rebound and no guarding  Musculoskeletal:        Lumbar back: He exhibits decreased range of motion  He exhibits no bony tenderness and no spasm  Feet:   Right Foot:   Skin Integrity: Positive for callus  Negative for ulcer, skin breakdown, erythema, warmth or dry skin  Left Foot:   Skin Integrity: Positive for callus  Negative for ulcer, skin breakdown, erythema, warmth or dry skin  Lymphadenopathy:     He has no cervical adenopathy  Neurological: He is alert and oriented to person, place, and time  No cranial nerve deficit  Gait ( uses cane for ambulation) abnormal    Skin: He is not diaphoretic  Psychiatric: He has a normal mood and affect  His behavior is normal  Thought content normal    Nursing note and vitals reviewed  Patient's shoes and socks removed  Right Foot/Ankle   Right Foot Inspection  Skin Exam: skin normal, skin intact, callus and callus no dry skin, no warmth, no erythema, no maceration, no abnormal color, no pre-ulcer and no ulcer                          Toe Exam: ROM and strength within normal limitsno tenderness, erythema and  no right toe deformity  Sensory Proprioception: intact   Monofilament testing: intact  Vascular  Capillary refills: < 3 seconds  The right DP pulse is 2+  The right PT pulse is 2+  Left Foot/Ankle  Left Foot Inspection  Skin Exam: skin normal, skin intact and callusno dry skin, no warmth, no erythema, no maceration, normal color, no pre-ulcer and no ulcer                         Toe Exam: ROM and strength within normal limitsno tenderness, no erythema and no left toe deformity                   Sensory     Proprioception: intact  Monofilament: intact  Vascular  Capillary refills: < 3 seconds  The left DP pulse is 2+  The left PT pulse is 2+  Assign Risk Category:  No deformity present; No loss of protective sensation;  No weak pulses       Risk: 0

## 2018-10-02 NOTE — ASSESSMENT & PLAN NOTE
Lab Results   Component Value Date    HGBA1C 7 1 10/02/2018       No results for input(s): POCGLU in the last 72 hours  Blood Sugar Average: Last 72 hrs:   A1c was obtained today which was 7 1%  At this time will restart patient on metformin 500 mg twice a day  Discussed with patient the importance of diet and exercise to control diabetes, and if able to we may be able to decrease medication once again

## 2018-10-02 NOTE — ASSESSMENT & PLAN NOTE
Lab work came back normal with the exception of elevated glucose which was related to diabetes  MRI was never completed  Patient does have a history of previous stroke  Will refer once again to Neurology with concerns of memory difficulty

## 2018-10-03 ENCOUNTER — TELEPHONE (OUTPATIENT)
Dept: NEUROLOGY | Facility: CLINIC | Age: 58
End: 2018-10-03

## 2018-11-28 ENCOUNTER — TELEPHONE (OUTPATIENT)
Dept: FAMILY MEDICINE CLINIC | Facility: CLINIC | Age: 58
End: 2018-11-28

## 2018-11-28 DIAGNOSIS — R26.9 GAIT ABNORMALITY: Primary | ICD-10-CM

## 2018-11-28 DIAGNOSIS — R41.3 MEMORY DIFFICULTY: ICD-10-CM

## 2018-11-28 NOTE — TELEPHONE ENCOUNTER
requesting a order for [facility] on [facility]      pts has an appt Friday 11/30/18      R41 3 MEMORY DIFFICULTY   R26 9 GAIT ABNORMALITY

## 2018-11-29 ENCOUNTER — APPOINTMENT (EMERGENCY)
Dept: RADIOLOGY | Facility: HOSPITAL | Age: 58
End: 2018-11-29
Payer: COMMERCIAL

## 2018-11-29 ENCOUNTER — APPOINTMENT (EMERGENCY)
Dept: CT IMAGING | Facility: HOSPITAL | Age: 58
End: 2018-11-29
Payer: COMMERCIAL

## 2018-11-29 ENCOUNTER — HOSPITAL ENCOUNTER (EMERGENCY)
Facility: HOSPITAL | Age: 58
Discharge: HOME | End: 2018-11-29
Attending: EMERGENCY MEDICINE | Admitting: EMERGENCY MEDICINE
Payer: COMMERCIAL

## 2018-11-29 VITALS
TEMPERATURE: 98.2 F | WEIGHT: 195.99 LBS | OXYGEN SATURATION: 98 % | HEART RATE: 71 BPM | SYSTOLIC BLOOD PRESSURE: 131 MMHG | RESPIRATION RATE: 18 BRPM | BODY MASS INDEX: 31.63 KG/M2 | DIASTOLIC BLOOD PRESSURE: 66 MMHG

## 2018-11-29 DIAGNOSIS — R19.8 SENSATION OF FOREIGN BODY IN ESOPHAGUS: ICD-10-CM

## 2018-11-29 DIAGNOSIS — J02.9 SORE THROAT: Primary | ICD-10-CM

## 2018-11-29 PROCEDURE — 71046 X-RAY EXAM CHEST 2 VIEWS: CPT

## 2018-11-29 PROCEDURE — 99284 EMERGENCY DEPT VISIT MOD MDM: CPT

## 2018-11-29 PROCEDURE — 70360 X-RAY EXAM OF NECK: CPT

## 2018-11-29 PROCEDURE — 70490 CT SOFT TISSUE NECK W/O DYE: CPT

## 2018-11-29 PROCEDURE — 74018 RADEX ABDOMEN 1 VIEW: CPT

## 2018-11-30 NOTE — ED ATTENDING ATTESTATION
Jakie Collet, DO, saw and evaluated the patient  I have discussed the patient with the resident/non-physician practitioner and agree with the resident's/non-physician practitioner's findings, Plan of Care, and MDM as documented in the resident's/non-physician practitioner's note, except where noted  All available labs and Radiology studies were reviewed  At this point I agree with the current assessment done in the Emergency Department  I have conducted an independent evaluation of this patient a history and physical is as follows:    51-year-old male presents for evaluation a foreign body sensation in his neck  The patient states that he was eating some chicken felt like a bone got stuck in the upper portion his neck near his larynx  States that his symptoms are worse with swelling  Denies any trouble breathing  On examination:  The patient is awake, alert and oriented  HEENT: Normocephalic/atraumatic  External examination of the ears is unremarkable  Pupils are equal round and reactive to light, there is no conjunctival injection or scleral icterus noted  Nares are patent without rhinorrhea  The oropharynx is moist without injection  The neck is supple  Lungs: Clear to auscultation bilaterally  Heart: Regular without murmurs rubs or gallops  Abdomen: Soft and nontender  There are positive bowel sounds  there is no rebound or guarding  Musculoskeletal: Normal range of motion with grossly normal strength  Neuro: Cranial nerves II through XII grossly intact  Nonfocal exam  Skin: No rash noted  Psych: Mood and affect normal    The plan is for imaging and possible GI evaluation    XR abdomen 1 view kub   ED Interpretation   No identifiable foreign body as interpreted by myself  Final Result      Nonobstructive bowel gas pattern  No radiopaque foreign body                 Workstation performed: [id number]         XR chest 2 views   ED Interpretation   No identifiable foreign body as interpreted by myself  Final Result      No acute cardiopulmonary disease  Workstation performed: [id number]         XR neck soft tissue   ED Interpretation   No identifiable foreign body as interpreted by myself  Final Result      No radiopaque foreign body  Workstation performed: [id number]         CT soft tissue neck wo contrast   Final Result      No acute CT findings  Workstation performed: [id number]             Imaging results reviewed without evidence esophageal foreign body or tracheal foreign body  The patient will be discharged home for outpatient follow-up with return precautions        Critical Care Time  CritCare Time    Procedures

## 2018-11-30 NOTE — ED PROVIDER NOTES
History  Chief Complaint   Patient presents with    Foreign Body in Throat     eating chicken, believes a piece of chicken bone is lodged in throat  This is a 74-year-old male with a history of hypertension, hyperlipidemia, hypothyroidism who presents after swallowing a chicken bone  The patient states that he was eating chicken and felt a bone in his mouth  States that he accidentally swallowed  Feels a foreign body sensation in his throat  Denies any difficulty swallowing  Only pain when he swallows  No difficulty breathing or stridor  Denies fever/chills, nausea/vomiting, lightheadedness/dizziness, numbness/weakness, headache, change in vision, URI symptoms, neck pain, chest pain, palpitations, shortness of breath, cough, back pain, flank pain, abdominal pain, diarrhea, hematochezia, melena, dysuria, hematuria  Prior to Admission Medications   Prescriptions Last Dose Informant Patient Reported? Taking?    FLUoxetine (PROZAC) 20 mg capsule   Yes No   Sig: take 1 capsule (20MG)  by oral route QID   Levothyroxine Sodium (TIROSINT) 50 MCG CAPS   Yes No   Sig: every 24 hours   aspirin (ECOTRIN LOW STRENGTH) 81 mg EC tablet   Yes No   Sig: Take 1 tablet by mouth daily   aspirin 325 mg tablet   Yes No   Sig: every 24 hours   atorvastatin (LIPITOR) 10 mg tablet   Yes No   Sig: Take 1 tablet by mouth daily   baclofen 10 mg tablet   Yes No   Sig: Take by mouth   cyclobenzaprine (FLEXERIL) 10 mg tablet   No No   Sig: Take 1 tablet (10 mg total) by mouth 3 (three) times a day as needed for muscle spasms   diclofenac (VOLTAREN) 25 MG EC tablet   No No   Sig: Take 1 tablet (25 mg total) by mouth 3 (three) times a day   diphenhydrAMINE (BENADRYL) 25 mg capsule   Yes No   Sig: take 1 Capsule (25MG)  by oral mouth bid   gabapentin (NEURONTIN) 300 mg capsule   No No   Sig: Take 1 capsule (300 mg total) by mouth 3 (three) times a day   glucose blood (ACCU-CHEK NATI) test strip   Yes No   Sig: by In Vitro route daily   hydrOXYzine HCL (ATARAX) 50 mg tablet   Yes No   Sig: Take 50 mg by mouth every 6 (six) hours   hydrochlorothiazide (HYDRODIURIL) 12 5 mg tablet   Yes No   Sig: every 24 hours   lisinopril (PRINIVIL) 5 mg tablet   Yes No   Sig: every 24 hours   loratadine (CLARITIN) 10 mg tablet   Yes No   Sig: every 24 hours   metFORMIN (GLUCOPHAGE) 500 mg tablet   No No   Sig: Take 1 tablet (500 mg total) by mouth 2 (two) times a day with meals   rOPINIRole (REQUIP) 0 25 mg tablet   Yes No   Sig: Take 0 25 mg by mouth   sertraline (ZOLOFT) 100 mg tablet   Yes No   Sig: Take 1 tablet by mouth daily   traMADol (ULTRAM) 50 mg tablet   Yes No   Sig: Take 1 tablet by mouth every 8 (eight) hours as needed   traZODone (DESYREL) 50 mg tablet   Yes No   Sig: Take 1 tablet by mouth      Facility-Administered Medications: None       Past Medical History:   Diagnosis Date    Arthritis     Chronic pain     [facility]A [facility]ce[facility] [address] )     Hyperlipidemia     [facility] )     Last Assessed:  4/7/15    My[facility])     Last Assessed:  9/22/16    Psychiatric disorder        Past Surgical History:   Procedure Laterality Date    ELBOW SURGERY Right     GALLBLADDER SURGERY         Family History   Problem Relation Age of Onset    Other Father         Cardiac disorder    Other Family         Cardiac disorder     I have reviewed and agree with the history as documented  Social History   Substance Use Topics    Smoking status: Former Smoker    Smokeless tobacco: Never Used    Alcohol use No      Comment: Stopped drinking alcohol        Review of Systems   Constitutional: Negative for chills, fatigue and fever  HENT: Negative for rhinorrhea, sore throat and trouble swallowing  Foreign body sensation in throat  Eyes: Negative for photophobia and visual disturbance  Respiratory: Negative for cough, chest tightness and shortness of breath      Cardiovascular: Negative for chest pain, palpitations and leg swelling  Gastrointestinal: Negative for abdominal pain, blood in stool, diarrhea, nausea and vomiting  Endocrine: Negative for polyuria  Genitourinary: Negative for dysuria, flank pain and hematuria  Musculoskeletal: Negative for back pain and neck pain  Skin: Negative for color change and rash  Allergic/Immunologic: Negative for immunocompromised state  Neurological: Negative for dizziness, weakness, light-headedness, numbness and headaches  All other systems reviewed and are negative  Physical Exam  ED Triage Vitals   Temperature Pulse Respirations Blood Pressure SpO2   11/29/18 1804 11/29/18 1805 11/29/18 1805 11/29/18 1805 11/29/18 1805   98 2 °F (36 8 °C) 82 18 154/67 98 %      Temp Source Heart Rate Source Patient Position - Orthostatic VS BP Location FiO2 (%)   11/29/18 1804 11/29/18 1805 11/29/18 1805 11/29/18 1805 --   Oral Monitor Sitting Right arm       Pain Score       11/29/18 1805       6           Orthostatic Vital Signs  Vitals:    11/29/18 1805 11/29/18 1930 11/29/18 2040   BP: 154/67 138/61 131/66   Pulse: 82 76 71   Patient Position - Orthostatic VS: Sitting Lying Lying       Physical Exam   Constitutional: Vital signs are normal  He appears well-developed and well-nourished  He is cooperative  No distress  HENT:   Mouth/Throat: Uvula is midline and oropharynx is clear and moist    Eyes: Pupils are equal, round, and reactive to light  Conjunctivae, EOM and lids are normal    Neck: Trachea normal  No thyroid mass and no thyromegaly present  Cardiovascular: Normal rate, regular rhythm, normal heart sounds, intact distal pulses and normal pulses  No murmur heard  Pulmonary/Chest: Effort normal and breath sounds normal    Abdominal: Soft  Normal appearance and bowel sounds are normal  There is no tenderness  There is no rebound, no guarding, no CVA tenderness and negative Tapia's sign  Neurological: He is alert     Skin: Skin is warm, dry and intact  Psychiatric: He has a normal mood and affect  His speech is normal and behavior is normal  Thought content normal        ED Medications  Medications - No data to display    Diagnostic Studies  Results Reviewed     None                 XR abdomen 1 view kub   ED Interpretation by Patricia Orosco MD (11/29 2102)   No identifiable foreign body as interpreted by myself  XR chest 2 views   ED Interpretation by Patricia Orosco MD (11/29 2102)   No identifiable foreign body as interpreted by myself  XR neck soft tissue   ED Interpretation by Patricia Orosco MD (11/29 2102)   No identifiable foreign body as interpreted by myself  CT soft tissue neck wo contrast   Final Result by Sania Vera MD (11/29 2042)      No acute CT findings  Workstation performed: [id number]               Procedures  Procedures      Phone Consults  ED Phone Contact    ED Course                               MDM  Number of Diagnoses or Management Options  Diagnosis management comments: X-ray neck, chest, abdomen  Disposition pending results  CritCare Time    Disposition  Final diagnoses:   Sore throat   Sensation of foreign body in esophagus     Time reflects when diagnosis was documented in both MDM as applicable and the Disposition within this note     Time User Action Codes Description Comment    11/29/2018  8:50 PM Rhiannon Larve Add [J02 9] Sore throat     11/29/2018  8:51 PM Rhiannon Larve Add [K22 8] Sensation of foreign body in esophagus       ED Disposition     ED Disposition Condition Comment    Discharge  Amadeo Schultz discharge to home/self care      Condition at discharge: Stable        Follow-up Information     Follow up With Specialties Details Why Marley Cohen, P[facility]imal[facility] Family Medicine, Physician Assistant Schedule an appointment as soon as possible for a visit if symptoms persis [address] [facility] [facility] [address] Emergency Department Emergency Medicine Go to If symptoms worsen [address] [facility]  [phone] [place] ED, [address] , Michel Geller MD Gastroenterology Call if symptoms persist [address] [facility]  [phone]             Patient's Medications   Discharge Prescriptions    No medications on file     No discharge procedures on file  ED Provider  Attending physically available and evaluated Amadeo Alvarado Yvette BURLESON managed the patient along with the ED Attending      Electronically Signed by         Prema Amezcua MD  11/29/18 0903

## 2018-11-30 NOTE — DISCHARGE INSTRUCTIONS
Foreign Body Ingestion   WHAT YOU NEED TO KNOW:   A foreign body is an object you swallowed  Examples include dental work and button batteries  A foreign body can cause problems as it moves through your digestive system  DISCHARGE INSTRUCTIONS:   Return to the emergency department if:   · You have a fever  · You have severe abdominal pain, nausea, or vomiting  · Your vomit or saliva is bloody  · Your bowel movements are black or bloody  Contact your healthcare provider if:   · You do not find the object in your bowel movement within 2 or 3 days  · You do not want to eat because of abdominal pain or vomiting  · You are drooling or hoarse  · You have questions or concerns about your condition or care  Look for the object in your bowel movements:  Search for the dental work, battery, or other small, smooth object each time you have a bowel movement  Do not use laxatives or stool softeners  Do not force yourself to vomit  Prevent another foreign body ingestion:   · Cut your food into small pieces  Chew your food well before you swallow  · Make sure dentures or other dental fixtures are secure  You may need to go to the dentist to have dental work repaired  Follow up with your healthcare provider as directed: You may need to return for x-rays or other tests  Write down your questions so you remember to ask them during your visits  © 2017 [address]  Information is for End User's use only and may not be sold, redistributed or otherwise used for commercial purposes  All illustrations and images included in CareNotes® are the copyrighted property of A D [employer] M [employer] Inc  or [employer]  The above information is an  only  It is not intended as medical advice for individual conditions or treatments  Talk to your doctor, nurse or pharmacist before following any medical regimen to see if it is safe and effective for you

## 2018-12-28 DIAGNOSIS — M25.561 CHRONIC PAIN OF RIGHT KNEE: ICD-10-CM

## 2018-12-28 DIAGNOSIS — M54.42 CHRONIC BILATERAL LOW BACK PAIN WITH BILATERAL SCIATICA: ICD-10-CM

## 2018-12-28 DIAGNOSIS — M54.41 CHRONIC BILATERAL LOW BACK PAIN WITH BILATERAL SCIATICA: ICD-10-CM

## 2018-12-28 DIAGNOSIS — G89.29 CHRONIC PAIN OF RIGHT KNEE: ICD-10-CM

## 2018-12-28 DIAGNOSIS — E11.9 TYPE 2 DIABETES MELLITUS WITHOUT COMPLICATION, WITHOUT LONG-TERM CURRENT USE OF INSULIN (HCC): ICD-10-CM

## 2018-12-28 DIAGNOSIS — G89.29 CHRONIC BILATERAL LOW BACK PAIN WITH BILATERAL SCIATICA: ICD-10-CM

## 2019-01-01 RX ORDER — GABAPENTIN 300 MG/1
300 CAPSULE ORAL 3 TIMES DAILY
Qty: 90 CAPSULE | Refills: 0 | Status: SHIPPED | OUTPATIENT
Start: 2019-01-01 | End: 2019-02-26 | Stop reason: SDUPTHER

## 2019-01-01 RX ORDER — DICLOFENAC SODIUM 25 MG/1
25 TABLET, DELAYED RELEASE ORAL 3 TIMES DAILY
Qty: 90 TABLET | Refills: 0 | Status: SHIPPED | OUTPATIENT
Start: 2019-01-01 | End: 2019-02-26 | Stop reason: SDUPTHER

## 2019-01-01 RX ORDER — CYCLOBENZAPRINE HCL 10 MG
10 TABLET ORAL 3 TIMES DAILY PRN
Qty: 90 TABLET | Refills: 0 | Status: SHIPPED | OUTPATIENT
Start: 2019-01-01 | End: 2019-02-26 | Stop reason: SDUPTHER

## 2019-01-09 ENCOUNTER — APPOINTMENT (EMERGENCY)
Dept: RADIOLOGY | Facility: HOSPITAL | Age: 59
End: 2019-01-09
Payer: COMMERCIAL

## 2019-01-09 ENCOUNTER — HOSPITAL ENCOUNTER (EMERGENCY)
Facility: HOSPITAL | Age: 59
Discharge: HOME | End: 2019-01-10
Attending: EMERGENCY MEDICINE
Payer: COMMERCIAL

## 2019-01-09 ENCOUNTER — APPOINTMENT (EMERGENCY)
Dept: CT IMAGING | Facility: HOSPITAL | Age: 59
End: 2019-01-09
Payer: COMMERCIAL

## 2019-01-09 VITALS
BODY MASS INDEX: 31.47 KG/M2 | TEMPERATURE: 98 F | RESPIRATION RATE: 20 BRPM | WEIGHT: 195 LBS | HEART RATE: 70 BPM | SYSTOLIC BLOOD PRESSURE: 164 MMHG | OXYGEN SATURATION: 100 % | DIASTOLIC BLOOD PRESSURE: 100 MMHG

## 2019-01-09 DIAGNOSIS — R42 DISEQUILIBRIUM: ICD-10-CM

## 2019-01-09 DIAGNOSIS — S39.012A STRAIN OF LUMBAR REGION, INITIAL ENCOUNTER: Primary | ICD-10-CM

## 2019-01-09 DIAGNOSIS — S09.8XXA BLUNT HEAD TRAUMA, INITIAL ENCOUNTER: ICD-10-CM

## 2019-01-09 PROCEDURE — 71046 X-RAY EXAM CHEST 2 VIEWS: CPT

## 2019-01-09 PROCEDURE — 99284 EMERGENCY DEPT VISIT MOD MDM: CPT

## 2019-01-09 PROCEDURE — 70450 CT HEAD/BRAIN W/O DYE: CPT

## 2019-01-09 PROCEDURE — 72131 CT LUMBAR SPINE W/O DYE: CPT

## 2019-01-09 RX ORDER — IBUPROFEN 400 MG/1
800 TABLET ORAL ONCE
Status: COMPLETED | OUTPATIENT
Start: 2019-01-09 | End: 2019-01-10

## 2019-01-10 RX ORDER — IBUPROFEN 800 MG/1
800 TABLET ORAL EVERY 6 HOURS PRN
Qty: 30 TABLET | Refills: 0 | Status: SHIPPED | OUTPATIENT
Start: 2019-01-10 | End: 2019-09-26

## 2019-01-10 RX ORDER — METHOCARBAMOL 750 MG/1
750-1500 TABLET, FILM COATED ORAL 4 TIMES DAILY
Qty: 20 TABLET | Refills: 0 | Status: SHIPPED | OUTPATIENT
Start: 2019-01-10 | End: 2019-09-26

## 2019-01-10 RX ADMIN — IBUPROFEN 800 MG: 400 TABLET ORAL at 00:07

## 2019-01-10 NOTE — DISCHARGE INSTRUCTIONS
Low Back Strain   WHAT YOU NEED TO KNOW:   Low back strain is an injury to your lower back muscles or tendons  Tendons are strong tissues that connect muscles to bones  The lower back supports most of your body weight and helps you move, twist, and bend  DISCHARGE INSTRUCTIONS:   Return to the emergency department if:   · You hear or feel a pop in your lower back  · You have increased swelling or pain in your lower back  · You have trouble moving your legs  · Your legs are numb  Contact your healthcare provider if:   · You have a fever  · Your pain does not go away, even after treatment  · You have questions or concerns about your condition or care  Medicines: The following medicines may be ordered by your healthcare provider:  · Acetaminophen decreases pain and fever  It is available without a doctor's order  Ask how much to take and how often to take it  Follow directions  Acetaminophen can cause liver damage if not taken correctly  · NSAIDs , such as ibuprofen, help decrease swelling, pain, and fever  This medicine is available with or without a doctor's order  NSAIDs can cause stomach bleeding or kidney problems in certain people  If you take blood thinner medicine, always ask your healthcare provider if NSAIDs are safe for you  Always read the medicine label and follow directions  · Muscle relaxers  help decrease pain and muscle spasms  · Prescription pain medicine  may be given  Ask how to take this medicine safely  · Take your medicine as directed  Contact your healthcare provider if you think your medicine is not helping or if you have side effects  Tell him or her if you are allergic to any medicine  Keep a list of the medicines, vitamins, and herbs you take  Include the amounts, and when and why you take them  Bring the list or the pill bottles to follow-up visits  Carry your medicine list with you in case of an emergency  Self-care:   · Rest  as directed   You may need to rest in bed for a period of time after your injury  Do not lift heavy objects  · Apply ice  on your back for 15 to 20 minutes every hour or as directed  Use an ice pack, or put crushed ice in a plastic bag  Cover it with a towel  Ice helps prevent tissue damage and decreases swelling and pain  · Apply heat  on your lower back for 20 to 30 minutes every 2 hours for as many days as directed  Heat helps decrease pain and muscle spasms  · Slowly start to increase your activity  as the pain decreases, or as directed  Prevent another low back strain:   · Use correct body movements  ¨ Bend at the hips and knees when you  objects  Do not bend from the waist  Use your leg muscles as you lift the load  Do not use your back  Keep the object close to your chest as you lift it  Try not to twist or lift anything above your waist     ¨ Change your position often when you stand for long periods of time  Rest one foot on a small box or footrest, and then switch to the other foot often  ¨ Try not to sit for long periods of time  When you do, sit in a straight-backed chair with your feet flat on the floor  ¨ Never reach, pull, or push while you are sitting  · Warm up before you exercise  Do exercises that strengthen your back muscles  Ask your healthcare provider about the best exercise plan for you  · Maintain a healthy weight  Ask your healthcare provider how much you should weigh  Ask him to help you create a weight loss plan if you are overweight  © 2017 [employer] Information is for End User's use only and may not be sold, redistributed or otherwise used for commercial purposes  All illustrations and images included in CareNotes® are the copyrighted property of "[employer]  or Jim [employer]is  The above information is an  only  It is not intended as medical advice for individual conditions or treatments   Talk to your doctor, nurse or pharmacist before following any medical regimen to see if it is safe and effective for you  Head Injury   WHAT YOU NEED TO KNOW:   A head injury is most often caused by a blow to the head  This may occur from a fall, bicycle injury, sports injury, being struck in the head, or a motor vehicle accident  DISCHARGE INSTRUCTIONS:   Call 911 or have someone else call for any of the following:   · You cannot be woken  · You have a seizure  · You stop responding to others or you faint  · You have blurry or double vision  · Your speech becomes slurred or confused  · You have arm or leg weakness, loss of feeling, or new problems with coordination  · Your pupils are larger than usual or one pupil is a different size than the other  · You have blood or clear fluid coming out of your ears or nose  Return to the emergency department if:   · You have repeated or forceful vomiting  · You feel confused  · Your headache gets worse or becomes severe  · You or someone caring for you notices that you are harder to wake than usual   Contact your healthcare provider if:   · Your symptoms last longer than 6 weeks after the injury  · You have questions or concerns about your condition or care  Medicines:   · Acetaminophen  decreases pain  Acetaminophen is available without a doctor's order  Ask how much to take and how often to take it  Follow directions  Acetaminophen can cause liver damage if not taken correctly  · Take your medicine as directed  Contact your healthcare provider if you think your medicine is not helping or if you have side effects  Tell him or her if you are allergic to any medicine  Keep a list of the medicines, vitamins, and herbs you take  Include the amounts, and when and why you take them  Bring the list or the pill bottles to follow-up visits  Carry your medicine list with you in case of an emergency  Self-care:   · Rest  or do quiet activities for 24 to 48 hours   Limit your time watching TV, using the computer, or doing tasks that require a lot of thinking  Slowly return to your normal activities as directed  Do not play sports or do activities that may cause you to get hit in the head  Ask your healthcare provider when you can return to sports  · Apply ice  on your head for 15 to 20 minutes every hour or as directed  Use an ice pack, or put crushed ice in a plastic bag  Cover it with a towel before you apply it to your skin  Ice helps prevent tissue damage and decreases swelling and pain  · Have someone stay with you for 24 hours  or as directed  This person can monitor you for complications and call [phone]  When you are awake the person should ask you a few questions to see if you are thinking clearly  An example would be to ask your name or your address  Prevent another head injury:   · Wear a helmet that fits properly  Do this when you play sports, or ride a bike, scooter, or skateboard  Helmets help decrease your risk of a serious head injury  Talk to your healthcare provider about other ways you can protect yourself if you play sports  · Wear your seat belt every time you are in a car  This helps to decrease your risk for a head injury if you are in a car accident  Follow up with your healthcare provider as directed:  Write down your questions so you remember to ask them during your visits  © 2017 [employer] Information is for End User's use only and may not be sold, redistributed or otherwise used for commercial purposes  All illustrations and images included in CareNotes® are the copyrighted property of A [employer] M [employer] Inc  or Jim Reis  The above information is an  only  It is not intended as medical advice for individual conditions or treatments  Talk to your doctor, nurse or pharmacist before following any medical regimen to see if it is safe and effective for you

## 2019-01-10 NOTE — ED PROVIDER NOTES
History  Chief Complaint   Patient presents with    Fall     lost balance- fall down interior steps, low back pain  no loc     Patient is a 57-year-old male  He has a history of poor balance  He was walking up some stairs around 1:00 p m  Today when he lost his balance and fell backwards  He fell down several stairs injuring his lower back  He has a history of chronic pain  He struck his head but did not lose consciousness  He denies neck pain  No focal motor or sensory complaints  He does report feeling as if he is having difficulty breathing since the fall  No abdominal pain  Denies any extremity trauma  Symptoms are moderate in intensity  Worse with movement  Prior to Admission Medications   Prescriptions Last Dose Informant Patient Reported? Taking?    FLUoxetine (PROZAC) 20 mg capsule   Yes No   Sig: take 1 capsule (20MG)  by oral route QID   Levothyroxine Sodium (TIROSINT) 50 MCG CAPS   Yes No   Sig: every 24 hours   aspirin (ECOTRIN LOW STRENGTH) 81 mg EC tablet   Yes No   Sig: Take 1 tablet by mouth daily   aspirin 325 mg tablet   Yes No   Sig: every 24 hours   atorvastatin (LIPITOR) 10 mg tablet   Yes No   Sig: Take 1 tablet by mouth daily   baclofen 10 mg tablet   Yes No   Sig: Take by mouth   cyclobenzaprine (FLEXERIL) 10 mg tablet   No No   Sig: Take 1 tablet (10 mg total) by mouth 3 (three) times a day as needed for muscle spasms   diclofenac (VOLTAREN) 25 MG EC tablet   No No   Sig: Take 1 tablet (25 mg total) by mouth 3 (three) times a day   diphenhydrAMINE (BENADRYL) 25 mg capsule   Yes No   Sig: take 1 Capsule (25MG)  by oral mouth bid   gabapentin (NEURONTIN) 300 mg capsule   No No   Sig: Take 1 capsule (300 mg total) by mouth 3 (three) times a day   glucose blood (ACCU-CHEK NATI) test strip   Yes No   Sig: by In Vitro route daily   hydrOXYzine HCL (ATARAX) 50 mg tablet   Yes No   Sig: Take 50 mg by mouth every 6 (six) hours   hydrochlorothiazide (HYDRODIURIL) 12 5 mg tablet   Yes No   Sig: every 24 hours   lisinopril (PRINIVIL) 5 mg tablet   Yes No   Sig: every 24 hours   loratadine (CLARITIN) 10 mg tablet   Yes No   Sig: every 24 hours   metFORMIN (GLUCOPHAGE) 500 mg tablet   No No   Sig: Take 1 tablet (500 mg total) by mouth 2 (two) times a day with meals   rOPINIRole (REQUIP) 0 25 mg tablet   Yes No   Sig: Take 0 25 mg by mouth   sertraline (ZOLOFT) 100 mg tablet   Yes No   Sig: Take 1 tablet by mouth daily   traMADol (ULTRAM) 50 mg tablet   Yes No   Sig: Take 1 tablet by mouth every 8 (eight) hours as needed   traZODone (DESYREL) 50 mg tablet   Yes No   Sig: Take 1 tablet by mouth      Facility-Administered Medications: None       Past Medical History:   Diagnosis Date    Arthritis     Chronic pain     [facility]cere[facility]) [facility] [address] [facility]     Hyperlipidemia     [facility]     Last Assessed:  4/7/15    [facility]ocardial infarction [facility])     Last Assessed:  9/22/16    Psychiatric disorder        Past Surgical History:   Procedure Laterality Date    ELBOW SURGERY Right     GALLBLADDER SURGERY         Family History   Problem Relation Age of Onset    Other Father         Cardiac disorder    Other Family         Cardiac disorder     I have reviewed and agree with the history as documented  Social History   Substance Use Topics    Smoking status: Former Smoker    Smokeless tobacco: Never Used    Alcohol use No      Comment: Stopped drinking alcohol        Review of Systems   Constitutional: Negative for chills and fever  HENT: Negative for rhinorrhea and sore throat  Eyes: Negative for pain, redness and visual disturbance  Respiratory: Negative for cough and stridor  Cardiovascular: Negative for chest pain and leg swelling  Gastrointestinal: Negative for abdominal pain, diarrhea and vomiting  Endocrine: Negative for polydipsia and polyuria  Genitourinary: Negative for dysuria, frequency and hematuria     Musculoskeletal: Positive for back pain  Negative for neck pain  Skin: Negative for rash and wound  Allergic/Immunologic: Negative for immunocompromised state  Neurological: Negative for weakness, numbness and headaches  Psychiatric/Behavioral: Negative for hallucinations and suicidal ideas  All other systems reviewed and are negative  Physical Exam  Physical Exam   Constitutional: He is oriented to person, place, and time  He appears well-developed and well-nourished  No distress  HENT:   Head: Normocephalic and atraumatic  There is no palpable scalp step off or swelling  No lacerations  There is no facial bone tenderness  No swelling or step-offs  No crepitus  There is no LeFort fracture  No otorrhea  No rhinorrhea  No nasal deformity or epistaxis  There is no raccoon's or Ramirez's sign  Eyes: Pupils are equal, round, and reactive to light  EOM are normal    Globes are intact  No hyphema  Orbits are atraumatic  Neck:   There is no midline C-spine tenderness  Trachea is midline  There is no step-offs or swelling  No crepitus  No JVD  Cardiovascular: Normal rate, regular rhythm, normal heart sounds and intact distal pulses  Exam reveals no gallop and no friction rub  No murmur heard  Pulmonary/Chest: Effort normal and breath sounds normal  No stridor  No respiratory distress  He exhibits no tenderness  There is no bruising  No crepitus  Abdominal: Soft  Bowel sounds are normal  He exhibits no distension  There is no tenderness  There is no rebound and no guarding  Musculoskeletal: Normal range of motion  He exhibits tenderness  He exhibits no deformity  There is tenderness to the lumbar spine in the midline  No step-off or swelling  No crepitus  No thoracic tenderness  No CVA tenderness  Extremities are nontender  Neurological: He is alert and oriented to person, place, and time  He has normal strength  No sensory deficit  GCS eye subscore is 4  GCS verbal subscore is 5  GCS motor subscore is 6  Skin: Skin is warm and dry  He is not diaphoretic  No pallor  Psychiatric: He has a normal mood and affect  His behavior is normal    Vitals reviewed  Vital Signs  ED Triage Vitals   Temperature Pulse Respirations Blood Pressure SpO2   01/09/19 2223 01/09/19 2223 01/09/19 2223 01/09/19 2230 01/09/19 2223   98 °F (36 7 °C) 73 18 135/77 100 %      Temp Source Heart Rate Source Patient Position - Orthostatic VS BP Location FiO2 (%)   01/09/19 2223 01/09/19 2223 01/09/19 2223 01/09/19 2223 --   Tympanic Monitor Sitting Right arm       Pain Score       01/09/19 2223       9           Vitals:    01/09/19 2223 01/09/19 2230 01/09/19 2350   BP:  135/77 164/100   Pulse: 73  70   Patient Position - Orthostatic VS: Sitting         Visual Acuity      ED Medications  Medications   ibuprofen (MOTRIN) tablet 800 mg (800 mg Oral Given 1/10/19 0007)       Diagnostic Studies  Results Reviewed     None                 CT lumbar spine without contrast   Final Result by Kirill Issa MD (01/10 0014)         1  No evidence of acute lumbar spine fracture or paraspinal hematoma  2   Chronic disc and facet degenerative change in the lower lumbar spine  Workstation performed: [id number]         XR chest 2 views   ED Interpretation by Noa Potter MD (01/10 0006)   No pneumothorax  No hemothorax  No rib fractures  No acute traumatic injuries  CT head without contrast   Final Result by Kirill Issa MD (01/10 0000)      No acute intracranial abnormality  Workstation performed: [id number]                    Procedures  Procedures       Phone Contacts  ED Phone Contact    ED Course                               MDM  Number of Diagnoses or Management Options  Diagnosis management comments: Imaging is negative  Head CT was negative for intracranial hemorrhage or skull fracture  L-spine CT was negative for fracture subluxation    Chest x-ray was negative for pneumothorax, widened mediastinum, or rib fracture  Appropriate for discharge and outpatient management  Amount and/or Complexity of Data Reviewed  Tests in the radiology section of CPT®: ordered and reviewed  Independent visualization of images, tracings, or specimens: yes      CritCare Time    Disposition  Final diagnoses:   Strain of lumbar region, initial encounter   Blunt head trauma, initial encounter   Disequilibrium     Time reflects when diagnosis was documented in both MDM as applicable and the Disposition within this note     Time User Action Codes Description Comment    1/10/2019 12:30 AM Brisa Maryse Add [S39 012A] Strain of lumbar region, initial encounter     1/10/2019 12:31 AM Brisa Maryse Add [S09  8XXA] Blunt head trauma, initial encounter     1/10/2019 12:31 AM Brisa Maryse Add [R42] Disequilibrium       ED Disposition     ED Disposition Condition Comment    Discharge  Amadeo Schultz discharge to home/self care  Condition at discharge: Good        Follow-up Information     Follow up With Specialties Details Why Contact Info    Isidro Francis PA-C Family Medicine, Physician Assistant In 2 days  [address] [phone]            Patient's Medications   Discharge Prescriptions    IBUPROFEN (MOTRIN) 800 MG TABLET    Take 1 tablet (800 mg total) by mouth every 6 (six) hours as needed (pain)       Start Date: 1/10/2019 End Date: --       Order Dose: 800 mg       Quantity: 30 tablet    Refills: 0    METHOCARBAMOL (ROBAXIN) 750 MG TABLET    Take 1-2 tablets (750-1,500 mg total) by mouth 4 (four) times a day Prn muscle spasm       Start Date: 1/10/2019 End Date: --       Order Dose: 750-1,500 mg       Quantity: 20 tablet    Refills: 0     No discharge procedures on file      ED Provider  Electronically Signed by           Cindi Wakefield MD  01/10/19 1402

## 2019-01-10 NOTE — ED TRIAGE NOTES
Pt reports fall happened around 1300 today, took motrin and tylenol without relief  Pt denies neck pain , dizziness, or additional areas of pain  Pt denies LOC  Unsure how many steps pt feel down, unwitnessed  Pt reports he is supposed to ambulate with a cane- does not do so regularly

## 2019-02-26 DIAGNOSIS — G89.29 CHRONIC PAIN OF RIGHT KNEE: ICD-10-CM

## 2019-02-26 DIAGNOSIS — G89.29 CHRONIC BILATERAL LOW BACK PAIN WITH BILATERAL SCIATICA: ICD-10-CM

## 2019-02-26 DIAGNOSIS — E11.9 TYPE 2 DIABETES MELLITUS WITHOUT COMPLICATION, WITHOUT LONG-TERM CURRENT USE OF INSULIN (HCC): ICD-10-CM

## 2019-02-26 DIAGNOSIS — M54.41 CHRONIC BILATERAL LOW BACK PAIN WITH BILATERAL SCIATICA: ICD-10-CM

## 2019-02-26 DIAGNOSIS — M54.42 CHRONIC BILATERAL LOW BACK PAIN WITH BILATERAL SCIATICA: ICD-10-CM

## 2019-02-26 DIAGNOSIS — M25.561 CHRONIC PAIN OF RIGHT KNEE: ICD-10-CM

## 2019-02-26 RX ORDER — DICLOFENAC SODIUM 25 MG/1
25 TABLET, DELAYED RELEASE ORAL 3 TIMES DAILY
Qty: 30 TABLET | Refills: 0 | Status: SHIPPED | OUTPATIENT
Start: 2019-02-26 | End: 2019-09-26

## 2019-02-26 RX ORDER — CYCLOBENZAPRINE HCL 10 MG
10 TABLET ORAL 3 TIMES DAILY PRN
Qty: 30 TABLET | Refills: 0 | Status: SHIPPED | OUTPATIENT
Start: 2019-02-26 | End: 2019-09-26

## 2019-02-26 RX ORDER — GABAPENTIN 300 MG/1
300 CAPSULE ORAL 3 TIMES DAILY
Qty: 30 CAPSULE | Refills: 0 | Status: SHIPPED | OUTPATIENT
Start: 2019-02-26 | End: 2019-09-26

## 2019-03-15 ENCOUNTER — APPOINTMENT (EMERGENCY)
Dept: CT IMAGING | Facility: HOSPITAL | Age: 59
End: 2019-03-15
Payer: COMMERCIAL

## 2019-03-15 ENCOUNTER — HOSPITAL ENCOUNTER (EMERGENCY)
Facility: HOSPITAL | Age: 59
Discharge: HOME | End: 2019-03-15
Attending: EMERGENCY MEDICINE | Admitting: EMERGENCY MEDICINE
Payer: COMMERCIAL

## 2019-03-15 ENCOUNTER — APPOINTMENT (EMERGENCY)
Dept: RADIOLOGY | Facility: HOSPITAL | Age: 59
End: 2019-03-15
Payer: COMMERCIAL

## 2019-03-15 VITALS
TEMPERATURE: 98.8 F | DIASTOLIC BLOOD PRESSURE: 77 MMHG | HEART RATE: 75 BPM | OXYGEN SATURATION: 100 % | RESPIRATION RATE: 19 BRPM | SYSTOLIC BLOOD PRESSURE: 118 MMHG

## 2019-03-15 DIAGNOSIS — N39.0 UTI (URINARY TRACT INFECTION): ICD-10-CM

## 2019-03-15 DIAGNOSIS — R53.83 FATIGUE: Primary | ICD-10-CM

## 2019-03-15 LAB
ALBUMIN SERPL BCP-MCNC: 4.5 G/DL (ref 3–5.2)
ALP SERPL-CCNC: 105 U/L (ref 43–122)
ALT SERPL W P-5'-P-CCNC: 32 U/L (ref 9–52)
ANION GAP SERPL CALCULATED.3IONS-SCNC: 13 MMOL/L (ref 5–14)
APTT PPP: 30 SECONDS (ref 23–34)
AST SERPL W P-5'-P-CCNC: 36 U/L (ref 17–59)
BACTERIA UR QL AUTO: ABNORMAL /HPF
BASOPHILS # BLD AUTO: 0 THOUSANDS/ΜL (ref 0–0.1)
BASOPHILS NFR BLD AUTO: 0 % (ref 0–1)
BILIRUB SERPL-MCNC: 1.2 MG/DL
BILIRUB UR QL STRIP: ABNORMAL
BUN SERPL-MCNC: 18 MG/DL (ref 5–25)
CALCIUM SERPL-MCNC: 9.3 MG/DL (ref 8.4–10.2)
CHLORIDE SERPL-SCNC: 97 MMOL/L (ref 97–108)
CLARITY UR: ABNORMAL
CO2 SERPL-SCNC: 28 MMOL/L (ref 22–30)
COLOR UR: ABNORMAL
CREAT SERPL-MCNC: 0.96 MG/DL (ref 0.7–1.5)
EOSINOPHIL # BLD AUTO: 0 THOUSAND/ΜL (ref 0–0.4)
EOSINOPHIL NFR BLD AUTO: 0 % (ref 0–6)
ERYTHROCYTE [DISTWIDTH] IN BLOOD BY AUTOMATED COUNT: 13.2 %
GFR SERPL CREATININE-BSD FRML MDRD: 88 ML/MIN/1.73SQ M
GLUCOSE SERPL-MCNC: 167 MG/DL (ref 70–99)
GLUCOSE UR STRIP-MCNC: ABNORMAL MG/DL
HCT VFR BLD AUTO: 45.9 % (ref 41–53)
HGB BLD-MCNC: 15.5 G/DL (ref 13.5–17.5)
HGB UR QL STRIP.AUTO: NEGATIVE
INR PPP: 0.94 (ref 0.89–1.1)
KETONES UR STRIP-MCNC: ABNORMAL MG/DL
LEUKOCYTE ESTERASE UR QL STRIP: 500
LYMPHOCYTES # BLD AUTO: 0.6 THOUSANDS/ΜL (ref 0.5–4)
LYMPHOCYTES NFR BLD AUTO: 10 % (ref 25–45)
MCH RBC QN AUTO: 29.6 PG (ref 26–34)
MCHC RBC AUTO-ENTMCNC: 33.8 G/DL (ref 31–36)
MCV RBC AUTO: 88 FL (ref 80–100)
MONOCYTES # BLD AUTO: 0.3 THOUSAND/ΜL (ref 0.2–0.9)
MONOCYTES NFR BLD AUTO: 5 % (ref 1–10)
MUCOUS THREADS UR QL AUTO: ABNORMAL
NEUTROPHILS # BLD AUTO: 5.2 THOUSANDS/ΜL (ref 1.8–7.8)
NEUTS SEG NFR BLD AUTO: 85 % (ref 45–65)
NITRITE UR QL STRIP: NEGATIVE
NON-SQ EPI CELLS URNS QL MICRO: ABNORMAL /HPF
PH UR STRIP.AUTO: 6 [PH]
PLATELET # BLD AUTO: 145 THOUSANDS/UL (ref 150–450)
PMV BLD AUTO: 7.8 FL (ref 8.9–12.7)
POTASSIUM SERPL-SCNC: 3.8 MMOL/L (ref 3.6–5)
PROT SERPL-MCNC: 8 G/DL (ref 5.9–8.4)
PROT UR STRIP-MCNC: ABNORMAL MG/DL
PROTHROMBIN TIME: 10 SECONDS (ref 9.5–11.6)
RBC # BLD AUTO: 5.23 MILLION/UL (ref 4.5–5.9)
RBC #/AREA URNS AUTO: ABNORMAL /HPF
SODIUM SERPL-SCNC: 138 MMOL/L (ref 137–147)
SP GR UR STRIP.AUTO: 1.02 (ref 1–1.04)
TROPONIN I SERPL-MCNC: <0.01 NG/ML (ref 0–0.03)
UROBILINOGEN UA: 8 MG/DL
WBC # BLD AUTO: 6.1 THOUSAND/UL (ref 4.5–11)
WBC #/AREA URNS AUTO: ABNORMAL /HPF

## 2019-03-15 PROCEDURE — 71046 X-RAY EXAM CHEST 2 VIEWS: CPT

## 2019-03-15 PROCEDURE — 36415 COLL VENOUS BLD VENIPUNCTURE: CPT | Performed by: EMERGENCY MEDICINE

## 2019-03-15 PROCEDURE — 85730 THROMBOPLASTIN TIME PARTIAL: CPT | Performed by: EMERGENCY MEDICINE

## 2019-03-15 PROCEDURE — 81001 URINALYSIS AUTO W/SCOPE: CPT | Performed by: EMERGENCY MEDICINE

## 2019-03-15 PROCEDURE — 99285 EMERGENCY DEPT VISIT HI MDM: CPT

## 2019-03-15 PROCEDURE — 85610 PROTHROMBIN TIME: CPT | Performed by: EMERGENCY MEDICINE

## 2019-03-15 PROCEDURE — 80053 COMPREHEN METABOLIC PANEL: CPT | Performed by: EMERGENCY MEDICINE

## 2019-03-15 PROCEDURE — 93005 ELECTROCARDIOGRAM TRACING: CPT

## 2019-03-15 PROCEDURE — 70450 CT HEAD/BRAIN W/O DYE: CPT

## 2019-03-15 PROCEDURE — 96360 HYDRATION IV INFUSION INIT: CPT

## 2019-03-15 PROCEDURE — 85025 COMPLETE CBC W/AUTO DIFF WBC: CPT | Performed by: EMERGENCY MEDICINE

## 2019-03-15 PROCEDURE — 84484 ASSAY OF TROPONIN QUANT: CPT | Performed by: EMERGENCY MEDICINE

## 2019-03-15 RX ORDER — CIPROFLOXACIN 500 MG/1
500 TABLET, FILM COATED ORAL 2 TIMES DAILY
Qty: 14 TABLET | Refills: 0 | Status: SHIPPED | OUTPATIENT
Start: 2019-03-15 | End: 2019-03-22

## 2019-03-15 RX ADMIN — SODIUM CHLORIDE 1000 ML: 9 INJECTION, SOLUTION INTRAVENOUS at 11:10

## 2019-03-15 NOTE — ED PROVIDER NOTES
History  Chief Complaint   Patient presents with    Weakness - Generalized     Pt c/o "weak, my whole body, it's been a couple weeks"  Pt states he is dizzy all the time, denies CP, headache, or SOB  Pt arrives visibly diaphoretic, although is wearing several layers  Pt AAO x 4, ambulatory with steady gait  53-year-old gentleman presents with complaint of generalized weakness  He reports that over the past two weeks he has felt weak all over with no focality being noted  He states that he has intermittent episodes of feeling dizzy/lightheaded  Whenever he is ambulating feels at times as though he might fall due to the weakness and dizziness  He denies falling any time recently  He has had some right-sided headaches which he states he has frequently over an unknown time period  He denies chest pain, shortness of breath, GI symptoms visual disturbance difficulty swallowing, or other acute issues or concerns  Fatigue   Severity:  Moderate  Onset quality:  Gradual  Timing:  Constant  Progression:  Waxing and waning  Relieved by:  Nothing  Worsened by:  Nothing  Ineffective treatments:  None tried  Associated symptoms: difficulty walking and dizziness    Associated symptoms: no abdominal pain, no anorexia, no aphasia, no arthralgias, no ataxia, no chest pain, no cough, no diarrhea, no dysphagia, no dysuria, no numbness in extremities, no falls, no fever, no foul-smelling urine, no frequency, no headaches, no hematochezia, no lethargy, no loss of consciousness, no melena, no myalgias, no nausea, no near-syncope, no seizures, no sensory-motor deficit, no shortness of breath, no syncope, no urgency, no vision change and no vomiting        Prior to Admission Medications   Prescriptions Last Dose Informant Patient Reported? Taking?    FLUoxetine (PROZAC) 20 mg capsule   Yes No   Sig: take 1 capsule (20MG)  by oral route QID   Levothyroxine Sodium (TIROSINT) 50 MCG CAPS   Yes No   Sig: every 24 hours   aspirin (ECOTRIN LOW STRENGTH) 81 mg EC tablet   Yes No   Sig: Take 1 tablet by mouth daily   aspirin 325 mg tablet   Yes No   Sig: every 24 hours   atorvastatin (LIPITOR) 10 mg tablet   Yes No   Sig: Take 1 tablet by mouth daily   baclofen 10 mg tablet   Yes No   Sig: Take by mouth   cyclobenzaprine (FLEXERIL) 10 mg tablet   No No   Sig: Take 1 tablet (10 mg total) by mouth 3 (three) times a day as needed for muscle spasms   diclofenac (VOLTAREN) 25 MG EC tablet   No No   Sig: Take 1 tablet (25 mg total) by mouth 3 (three) times a day   diphenhydrAMINE (BENADRYL) 25 mg capsule   Yes No   Sig: take 1 Capsule (25MG)  by oral mouth bid   gabapentin (NEURONTIN) 300 mg capsule   No No   Sig: Take 1 capsule (300 mg total) by mouth 3 (three) times a day   glucose blood (ACCU-CHEK NATI) test strip   Yes No   Sig: by In Vitro route daily   hydrOXYzine HCL (ATARAX) 50 mg tablet   Yes No   Sig: Take 50 mg by mouth every 6 (six) hours   hydrochlorothiazide (HYDRODIURIL) 12 5 mg tablet   Yes No   Sig: every 24 hours   ibuprofen (MOTRIN) 800 mg tablet   No No   Sig: Take 1 tablet (800 mg total) by mouth every 6 (six) hours as needed (pain)   lisinopril (PRINIVIL) 5 mg tablet   Yes No   Sig: every 24 hours   loratadine (CLARITIN) 10 mg tablet   Yes No   Sig: every 24 hours   metFORMIN (GLUCOPHAGE) 500 mg tablet   No No   Sig: Take 1 tablet (500 mg total) by mouth 2 (two) times a day with meals   methocarbamol (ROBAXIN) 750 mg tablet   No No   Sig: Take 1-2 tablets (750-1,500 mg total) by mouth 4 (four) times a day Prn muscle spasm   rOPINIRole (REQUIP) 0 25 mg tablet   Yes No   Sig: Take 0 25 mg by mouth   sertraline (ZOLOFT) 100 mg tablet   Yes No   Sig: Take 1 tablet by mouth daily   traMADol (ULTRAM) 50 mg tablet   Yes No   Sig: Take 1 tablet by mouth every 8 (eight) hours as needed   traZODone (DESYREL) 50 mg tablet   Yes No   Sig: Take 1 tablet by mouth      Facility-Administered Medications: None       Past Medical History: Diagnosis Date    Arthritis     Chronic pain     [facility] [address] [facility]     Hyperlipidemia     [facility]     Last Assessed:  4/7/15    Myocardial infarction [facility])     Last Assessed:  9/22/16    Psychiatric disorder        Past Surgical History:   Procedure Laterality Date    CHOLECYSTECTOMY      ELBOW SURGERY Right     ELBOW SURGERY Right     GALLBLADDER SURGERY         Family History   Problem Relation Age of Onset    Other Father         Cardiac disorder    Other Family         Cardiac disorder     I have reviewed and agree with the history as documented  Social History     Tobacco Use    Smoking status: Former Smoker    Smokeless tobacco: Never Used   Substance Use Topics    Alcohol use: No     Comment: Stopped drinking alcohol    Drug use: No     Comment: Per [employer]:  History of drug use        Review of Systems   Constitutional: Positive for fatigue  Negative for activity change, chills and fever  HENT: Negative  Negative for congestion, postnasal drip, rhinorrhea, sinus pain, sore throat and trouble swallowing  Eyes: Negative  Respiratory: Negative  Negative for cough and shortness of breath  Cardiovascular: Negative for chest pain, palpitations, syncope and near-syncope  Gastrointestinal: Negative for abdominal pain, anorexia, constipation, diarrhea, dysphagia, hematochezia, melena, nausea and vomiting  Endocrine: Negative  Genitourinary: Negative  Negative for dysuria, frequency and urgency  Musculoskeletal: Negative  Negative for arthralgias, back pain, falls and myalgias  Skin: Negative  Allergic/Immunologic: Negative  Neurological: Positive for dizziness  Negative for seizures, loss of consciousness and headaches  Hematological: Negative  Psychiatric/Behavioral: Negative  Physical Exam  Physical Exam   Constitutional: He is oriented to person, place, and time   He appears well-developed and well-nourished  No distress  HENT:   Head: Normocephalic and atraumatic  Nose: Nose normal    Mouth/Throat: Oropharynx is clear and moist    Eyes: Pupils are equal, round, and reactive to light  Conjunctivae are normal    Neck: Neck supple  Cardiovascular: Normal rate, regular rhythm and normal heart sounds  Pulmonary/Chest: Effort normal and breath sounds normal  No respiratory distress  Abdominal: Soft  Bowel sounds are normal  He exhibits no distension  There is no tenderness  There is no guarding  Musculoskeletal: Normal range of motion  He exhibits no edema  Neurological: He is alert and oriented to person, place, and time  He has normal strength  No cranial nerve deficit or sensory deficit  Gait (Steady gait) normal  GCS eye subscore is 4  GCS verbal subscore is 5  GCS motor subscore is 6  Skin: Skin is warm and dry  Capillary refill takes less than 2 seconds  He is not diaphoretic  Psychiatric: He has a normal mood and affect  His behavior is normal    Nursing note and vitals reviewed        Vital Signs  ED Triage Vitals [03/15/19 1051]   Temperature Pulse Respirations Blood Pressure SpO2   97 5 °F (36 4 °C) 91 16 120/75 100 %      Temp Source Heart Rate Source Patient Position - Orthostatic VS BP Location FiO2 (%)   Tympanic Monitor Sitting Left arm --      Pain Score       No Pain           Vitals:    03/15/19 1051 03/15/19 1212 03/15/19 1400   BP: 120/75 130/80 118/77   Pulse: 91 76 75   Patient Position - Orthostatic VS: Sitting Lying Sitting       qSOFA     Row Name 03/15/19 1400 03/15/19 1212 03/15/19 1054 03/15/19 1051       Altered mental status GCS < 15      0       Respiratory Rate > / =22  0  0    0     Systolic BP < / =655  0  0    0     Q Sofa Score  0  0  0  0           Visual Acuity  Visual Acuity      Most Recent Value   L Pupil Size (mm)  3   R Pupil Size (mm)  3          ED Medications  Medications   sodium chloride 0 9 % bolus 1,000 mL (0 mL Intravenous Stopped 3/15/19 1212)       Diagnostic Studies  Results Reviewed     Procedure Component Value Units Date/Time    Urine Microscopic [[id number]]  (Abnormal) Collected:  03/15/19 1249    Lab Status:  Final result Specimen:  Urine, Clean Catch Updated:  03/15/19 1328     RBC, UA 0-1 /hpf      WBC, UA 20-30 /hpf      Epithelial Cells Occasional /hpf      Bacteria, UA Occasional /hpf      MUCUS THREADS Occasional    UA w Reflex to Microscopic [[id number]]  (Abnormal) Collected:  03/15/19 1249    Lab Status:  Final result Specimen:  Urine, Clean Catch Updated:  03/15/19 1317     Color, UA Brown     Clarity, UA Slightly Cloudy     Specific [place], UA 1 020     pH, UA 6 0     Leukocytes,  0     Nitrite, UA Negative     Protein, UA 30 (1+) mg/dl      Glucose,  (1/4%) mg/dl      Ketones, UA 15 (1+) mg/dl      Bilirubin, UA 1 mg/dL     Blood, UA Negative     UROBILINOGEN UA 8 0 mg/dL     Protime-INR [[id number]]  (Normal) Collected:  03/15/19 1109    Lab Status:  Final result Specimen:  Blood from Arm, Right Updated:  03/15/19 1308     Protime 10 0 seconds      INR 0 94    APTT [[id number]]  (Normal) Collected:  03/15/19 1109    Lab Status:  Final result Specimen:  Blood from Arm, Right Updated:  03/15/19 1308     PTT 30 seconds     Troponin I [[id number]]  (Normal) Collected:  03/15/19 1109    Lab Status:  Final result Specimen:  Blood from Arm, Right Updated:  03/15/19 1150     Troponin I <0 01 ng/mL     CBC and differential [[id number]]  (Abnormal) Collected:  03/15/19 1109    Lab Status:  Final result Specimen:  Blood from Arm, Right Updated:  03/15/19 1132     WBC 6 10 Thousand/uL      RBC 5 23 Million/uL      Hemoglobin 15 5 g/dL      Hematocrit 45 9 %      MCV 88 fL      MCH 29 6 pg      MCHC 33 8 g/dL      RDW 13 2 %      MPV 7 8 fL      Platelets 404 Thousands/uL      Neutrophils Relative 85 %      Lymphocytes Relative 10 %      Monocytes Relative 5 %      Eosinophils Relative 0 %      Basophils Relative 0 %      Neutrophils Absolute 5 20 Thousands/µL      Lymphocytes Absolute 0 60 Thousands/µL      Monocytes Absolute 0 30 Thousand/µL      Eosinophils Absolute 0 00 Thousand/µL      Basophils Absolute 0 00 Thousands/µL     Comprehensive metabolic panel [[id number]]  (Abnormal) Collected:  03/15/19 1109    Lab Status:  Final result Specimen:  Blood from Arm, Right Updated:  03/15/19 1131     Sodium 138 mmol/L      Potassium 3 8 mmol/L      Chloride 97 mmol/L      CO2 28 mmol/L      ANION GAP 13 mmol/L      BUN 18 mg/dL      Creatinine 0 96 mg/dL      Glucose 167 mg/dL      Calcium 9 3 mg/dL      AST 36 U/L      ALT 32 U/L      Alkaline Phosphatase 105 U/L      Total Protein 8 0 g/dL      Albumin 4 5 g/dL      Total Bilirubin 1 20 mg/dL      eGFR 88 ml/min/1 73sq m     Narrative:       National Kidney Disease Education Program recommendations are as follows:  GFR calculation is accurate only with a steady state creatinine  Chronic Kidney disease less than 60 ml/min/1 73 sq  meters  Kidney failure less than 15 ml/min/1 73 sq  meters  XR chest 2 views   ED Interpretation by Shira Thompson DO (03/15 1233)   No acute findings      Final Result by Sabine Cisneros MD (03/15 1318)      No acute cardiopulmonary disease  Workstation performed: [id number]         CT head without contrast   Final Result by Maci Mcgee MD (03/15 1212)      No acute intracranial abnormality  Workstation performed: [id number]                    Procedures  ECG 12 Lead Documentation  Date/Time: 3/15/2019 11:26 AM  Performed by: Shira Thompson DO  Authorized by:  Shira Thompson DO     Rate:     ECG rate:  79    ECG rate assessment: normal    Rhythm:     Rhythm: sinus rhythm    Ectopy:     Ectopy: none    QRS:     QRS axis:  Normal  Conduction:     Conduction: normal    ST segments:     ST segments:  Normal  T waves:     T waves: normal             Phone Contacts  ED Phone Contact    ED Course             Stroke Assessment     Row Name 03/15/19 1053             NIH Stroke Scale    Interval  Baseline      Level of Consciousness (1a )  0      LOC Questions (1b )  0      LOC Commands (1c )  0      Best Gaze (2 )  0      Visual (3 )  0      Facial Palsy (4 )  0      Motor Arm, Left (5a )  0      Motor Arm, Right (5b )  0      Motor Leg, Left (6a )  0      Motor Leg, Right (6b )  0      Limb Ataxia (7 )  0      Sensory (8 )  0      Best Language (9 )  0      Dysarthria (10 )  0      Extinction and Inattention (11 ) (Formerly Neglect)  0      Total  0                          MDM  Number of Diagnoses or Management Options  Fatigue:   UTI (urinary tract infection):   Diagnosis management comments: 28-year-old gentleman presents with complaint of generalized weakness and fatigue has been progressive over the past two weeks  He denies fevers, chest pain, shortness of breath, GI symptoms, or other acute issues or concerns  On exam there are no focal deficits appreciated  Workup is essentially unremarkable with the exception of dirty urine  After receiving a L of IV fluids, the patient did report improvement of symptoms overall  Aware the need for follow-up along with reasons to return to the ER         Amount and/or Complexity of Data Reviewed  Clinical lab tests: ordered and reviewed  Tests in the radiology section of CPT®: ordered and reviewed  Tests in the medicine section of CPT®: ordered and reviewed  Independent visualization of images, tracings, or specimens: yes        Disposition  Final diagnoses:   Fatigue   UTI (urinary tract infection)     Time reflects when diagnosis was documented in both MDM as applicable and the Disposition within this note     Time User Action Codes Description Comment    3/15/2019  2:06 PM Mahin Martinez Add [R53 1] Weakness     3/15/2019  2:06 PM Mahin Martinez Remove [R53 1] Weakness     3/15/2019  2:06 PM Mahin Pique Add [R53 83] Fatigue     3/15/2019  2:06 PM Mahin Pique Add [N39 0] UTI (urinary tract infection)       ED Disposition     ED Disposition Condition Date/Time Comment    Discharge Stable Fri Mar 15, 2019  2:06 PM Amadeo Schultz discharge to home/self care  Follow-up Information     Follow up With Specialties Details Why Contact Info    Fidencio Benson PA-C Family Medicine, Physician Assistant   [id number] [place] [address],[address] [facility] [phone]            Patient's Medications   Discharge Prescriptions    CIPROFLOXACIN (CIPRO) 500 MG TABLET    Take 1 tablet (500 mg total) by mouth 2 (two) times a day for 7 days       Start Date: 3/15/2019 End Date: 3/22/2019       Order Dose: 500 mg       Quantity: 14 tablet    Refills: 0     No discharge procedures on file      ED Provider  Electronically Signed by           Stephanie Strickland,   03/15/19 [id number] [facility],   03/15/19 [id number] [facility], DO  03/15/19 1409

## 2019-03-15 NOTE — ED NOTES
Patient transported to [address] [place] [address], [address] [place] [address]  03/15/19 8109

## 2019-03-16 LAB
ATRIAL RATE: 79 BPM
P AXIS: 34 DEGREES
PR INTERVAL: 144 MS
QRS AXIS: 54 DEGREES
QRSD INTERVAL: 80 MS
QT INTERVAL: 368 MS
QTC INTERVAL: 421 MS
T WAVE AXIS: 47 DEGREES
VENTRICULAR RATE: 79 BPM

## 2019-03-16 PROCEDURE — 93010 ELECTROCARDIOGRAM REPORT: CPT | Performed by: INTERNAL MEDICINE

## 2019-06-24 DIAGNOSIS — M54.42 CHRONIC BILATERAL LOW BACK PAIN WITH BILATERAL SCIATICA: ICD-10-CM

## 2019-06-24 DIAGNOSIS — G89.29 CHRONIC BILATERAL LOW BACK PAIN WITH BILATERAL SCIATICA: ICD-10-CM

## 2019-06-24 DIAGNOSIS — M54.41 CHRONIC BILATERAL LOW BACK PAIN WITH BILATERAL SCIATICA: ICD-10-CM

## 2019-06-24 DIAGNOSIS — M25.561 CHRONIC PAIN OF RIGHT KNEE: ICD-10-CM

## 2019-06-24 DIAGNOSIS — G89.29 CHRONIC PAIN OF RIGHT KNEE: ICD-10-CM

## 2019-06-25 RX ORDER — DICLOFENAC SODIUM 25 MG/1
25 TABLET, DELAYED RELEASE ORAL 3 TIMES DAILY
Qty: 90 TABLET | Refills: 0 | Status: SHIPPED | OUTPATIENT
Start: 2019-06-25 | End: 2019-09-26

## 2019-06-25 RX ORDER — CYCLOBENZAPRINE HCL 10 MG
10 TABLET ORAL 3 TIMES DAILY PRN
Qty: 90 TABLET | Refills: 0 | Status: SHIPPED | OUTPATIENT
Start: 2019-06-25

## 2019-06-25 RX ORDER — GABAPENTIN 300 MG/1
300 CAPSULE ORAL 3 TIMES DAILY
Qty: 90 CAPSULE | Refills: 0 | Status: SHIPPED | OUTPATIENT
Start: 2019-06-25

## 2019-07-25 DIAGNOSIS — M54.42 CHRONIC BILATERAL LOW BACK PAIN WITH BILATERAL SCIATICA: ICD-10-CM

## 2019-07-25 DIAGNOSIS — M25.561 CHRONIC PAIN OF RIGHT KNEE: ICD-10-CM

## 2019-07-25 DIAGNOSIS — G89.29 CHRONIC PAIN OF RIGHT KNEE: ICD-10-CM

## 2019-07-25 DIAGNOSIS — G89.29 CHRONIC BILATERAL LOW BACK PAIN WITH BILATERAL SCIATICA: ICD-10-CM

## 2019-07-25 DIAGNOSIS — M54.41 CHRONIC BILATERAL LOW BACK PAIN WITH BILATERAL SCIATICA: ICD-10-CM

## 2019-07-25 RX ORDER — GABAPENTIN 300 MG/1
300 CAPSULE ORAL 3 TIMES DAILY
Qty: 90 CAPSULE | Refills: 0 | OUTPATIENT
Start: 2019-07-25

## 2019-07-25 RX ORDER — CYCLOBENZAPRINE HCL 10 MG
10 TABLET ORAL 3 TIMES DAILY PRN
Qty: 90 TABLET | Refills: 0 | OUTPATIENT
Start: 2019-07-25

## 2019-08-15 DIAGNOSIS — M54.41 CHRONIC BILATERAL LOW BACK PAIN WITH BILATERAL SCIATICA: ICD-10-CM

## 2019-08-15 DIAGNOSIS — M54.42 CHRONIC BILATERAL LOW BACK PAIN WITH BILATERAL SCIATICA: ICD-10-CM

## 2019-08-15 DIAGNOSIS — G89.29 CHRONIC BILATERAL LOW BACK PAIN WITH BILATERAL SCIATICA: ICD-10-CM

## 2019-08-15 DIAGNOSIS — G89.29 CHRONIC PAIN OF RIGHT KNEE: ICD-10-CM

## 2019-08-15 DIAGNOSIS — M25.561 CHRONIC PAIN OF RIGHT KNEE: ICD-10-CM

## 2019-08-15 RX ORDER — CYCLOBENZAPRINE HCL 10 MG
10 TABLET ORAL 3 TIMES DAILY PRN
Qty: 90 TABLET | Refills: 0 | OUTPATIENT
Start: 2019-08-15

## 2019-08-15 RX ORDER — GABAPENTIN 300 MG/1
300 CAPSULE ORAL 3 TIMES DAILY
Qty: 90 CAPSULE | Refills: 0 | OUTPATIENT
Start: 2019-08-15

## 2019-08-15 RX ORDER — DICLOFENAC SODIUM 25 MG/1
25 TABLET, DELAYED RELEASE ORAL 3 TIMES DAILY
Qty: 90 TABLET | Refills: 0 | OUTPATIENT
Start: 2019-08-15

## 2019-08-22 ENCOUNTER — APPOINTMENT (OUTPATIENT)
Dept: LAB | Facility: CLINIC | Age: 59
End: 2019-08-22
Payer: COMMERCIAL

## 2019-08-22 ENCOUNTER — TRANSCRIBE ORDERS (OUTPATIENT)
Dept: LAB | Facility: CLINIC | Age: 59
End: 2019-08-22

## 2019-08-22 DIAGNOSIS — E11.8 TYPE 2 DIABETES MELLITUS WITH COMPLICATION, UNSPECIFIED WHETHER LONG TERM INSULIN USE: ICD-10-CM

## 2019-08-22 DIAGNOSIS — I10 HYPERTENSION, UNSPECIFIED TYPE: Primary | ICD-10-CM

## 2019-08-22 DIAGNOSIS — I10 HYPERTENSION, UNSPECIFIED TYPE: ICD-10-CM

## 2019-08-22 LAB
ALBUMIN SERPL BCP-MCNC: 3.9 G/DL (ref 3.5–5)
ALP SERPL-CCNC: 86 U/L (ref 46–116)
ALT SERPL W P-5'-P-CCNC: 26 U/L (ref 12–78)
ANION GAP SERPL CALCULATED.3IONS-SCNC: 1 MMOL/L (ref 4–13)
AST SERPL W P-5'-P-CCNC: 14 U/L (ref 5–45)
BILIRUB SERPL-MCNC: 0.8 MG/DL (ref 0.2–1)
BUN SERPL-MCNC: 12 MG/DL (ref 5–25)
CALCIUM SERPL-MCNC: 8.7 MG/DL (ref 8.3–10.1)
CHLORIDE SERPL-SCNC: 104 MMOL/L (ref 100–108)
CO2 SERPL-SCNC: 31 MMOL/L (ref 21–32)
CREAT SERPL-MCNC: 1.21 MG/DL (ref 0.6–1.3)
EST. AVERAGE GLUCOSE BLD GHB EST-MCNC: 126 MG/DL
GFR SERPL CREATININE-BSD FRML MDRD: 67 ML/MIN/1.73SQ M
GLUCOSE P FAST SERPL-MCNC: 134 MG/DL (ref 65–99)
HBA1C MFR BLD: 6 % (ref 4.2–6.3)
POTASSIUM SERPL-SCNC: 4.2 MMOL/L (ref 3.5–5.3)
PROT SERPL-MCNC: 7.3 G/DL (ref 6.4–8.2)
SODIUM SERPL-SCNC: 136 MMOL/L (ref 136–145)
TSH SERPL DL<=0.05 MIU/L-ACNC: 3.39 UIU/ML (ref 0.36–3.74)

## 2019-08-22 PROCEDURE — 84443 ASSAY THYROID STIM HORMONE: CPT

## 2019-08-22 PROCEDURE — 83036 HEMOGLOBIN GLYCOSYLATED A1C: CPT

## 2019-08-22 PROCEDURE — 80053 COMPREHEN METABOLIC PANEL: CPT

## 2019-08-22 PROCEDURE — 36415 COLL VENOUS BLD VENIPUNCTURE: CPT

## 2019-09-26 ENCOUNTER — APPOINTMENT (EMERGENCY)
Dept: RADIOLOGY | Facility: HOSPITAL | Age: 59
End: 2019-09-26
Payer: COMMERCIAL

## 2019-09-26 ENCOUNTER — HOSPITAL ENCOUNTER (OUTPATIENT)
Facility: HOSPITAL | Age: 59
Setting detail: OBSERVATION
Discharge: HOME | End: 2019-09-27
Attending: EMERGENCY MEDICINE | Admitting: FAMILY MEDICINE
Payer: COMMERCIAL

## 2019-09-26 DIAGNOSIS — R41.3 MEMORY DIFFICULTY: ICD-10-CM

## 2019-09-26 DIAGNOSIS — E86.0 DEHYDRATION: ICD-10-CM

## 2019-09-26 DIAGNOSIS — N17.9 ACUTE KIDNEY INJURY (HCC): Primary | ICD-10-CM

## 2019-09-26 PROBLEM — R55 NEAR SYNCOPE: Status: ACTIVE | Noted: 2019-09-26

## 2019-09-26 PROBLEM — R31.29 MICROSCOPIC HEMATURIA: Status: ACTIVE | Noted: 2019-09-26

## 2019-09-26 PROBLEM — R73.03 PREDIABETES: Status: ACTIVE | Noted: 2019-09-26

## 2019-09-26 LAB
ALBUMIN SERPL BCP-MCNC: 5 G/DL (ref 3–5.2)
ALP SERPL-CCNC: 59 U/L (ref 43–122)
ALT SERPL W P-5'-P-CCNC: 29 U/L (ref 9–52)
ANION GAP SERPL CALCULATED.3IONS-SCNC: 15 MMOL/L (ref 5–14)
AST SERPL W P-5'-P-CCNC: 30 U/L (ref 17–59)
BACTERIA UR QL AUTO: ABNORMAL /HPF
BASOPHILS # BLD AUTO: 0 THOUSANDS/ΜL (ref 0–0.1)
BASOPHILS NFR BLD AUTO: 0 % (ref 0–1)
BILIRUB SERPL-MCNC: 0.9 MG/DL
BILIRUB UR QL STRIP: NEGATIVE
BUN SERPL-MCNC: 34 MG/DL (ref 5–25)
CALCIUM SERPL-MCNC: 10.1 MG/DL (ref 8.4–10.2)
CHLORIDE SERPL-SCNC: 94 MMOL/L (ref 97–108)
CLARITY UR: CLEAR
CO2 SERPL-SCNC: 26 MMOL/L (ref 22–30)
COLOR UR: YELLOW
CREAT SERPL-MCNC: 2.23 MG/DL (ref 0.7–1.5)
EOSINOPHIL # BLD AUTO: 0 THOUSAND/ΜL (ref 0–0.4)
EOSINOPHIL NFR BLD AUTO: 0 % (ref 0–6)
ERYTHROCYTE [DISTWIDTH] IN BLOOD BY AUTOMATED COUNT: 13.6 %
GFR SERPL CREATININE-BSD FRML MDRD: 32 ML/MIN/1.73SQ M
GLUCOSE SERPL-MCNC: 70 MG/DL (ref 70–99)
GLUCOSE UR STRIP-MCNC: NEGATIVE MG/DL
HCT VFR BLD AUTO: 40.9 % (ref 41–53)
HGB BLD-MCNC: 14.2 G/DL (ref 13.5–17.5)
HGB UR QL STRIP.AUTO: 25
HYALINE CASTS #/AREA URNS LPF: ABNORMAL /LPF
KETONES UR STRIP-MCNC: NEGATIVE MG/DL
LEUKOCYTE ESTERASE UR QL STRIP: NEGATIVE
LYMPHOCYTES # BLD AUTO: 0.8 THOUSANDS/ΜL (ref 0.5–4)
LYMPHOCYTES NFR BLD AUTO: 9 % (ref 25–45)
MCH RBC QN AUTO: 30.7 PG (ref 26–34)
MCHC RBC AUTO-ENTMCNC: 34.9 G/DL (ref 31–36)
MCV RBC AUTO: 88 FL (ref 80–100)
MONOCYTES # BLD AUTO: 0.4 THOUSAND/ΜL (ref 0.2–0.9)
MONOCYTES NFR BLD AUTO: 4 % (ref 1–10)
MUCOUS THREADS UR QL AUTO: ABNORMAL
NEUTROPHILS # BLD AUTO: 7.1 THOUSANDS/ΜL (ref 1.8–7.8)
NEUTS SEG NFR BLD AUTO: 86 % (ref 45–65)
NITRITE UR QL STRIP: NEGATIVE
NON-SQ EPI CELLS URNS QL MICRO: ABNORMAL /HPF
PH UR STRIP.AUTO: 5 [PH]
PLATELET # BLD AUTO: 180 THOUSANDS/UL (ref 150–450)
PMV BLD AUTO: 8.2 FL (ref 8.9–12.7)
POTASSIUM SERPL-SCNC: 4.5 MMOL/L (ref 3.6–5)
PROT SERPL-MCNC: 8.2 G/DL (ref 5.9–8.4)
PROT UR STRIP-MCNC: ABNORMAL MG/DL
RBC # BLD AUTO: 4.64 MILLION/UL (ref 4.5–5.9)
RBC #/AREA URNS AUTO: ABNORMAL /HPF
SODIUM SERPL-SCNC: 135 MMOL/L (ref 137–147)
SP GR UR STRIP.AUTO: 1.01 (ref 1–1.04)
TROPONIN I SERPL-MCNC: <0.01 NG/ML (ref 0–0.03)
UROBILINOGEN UA: NEGATIVE MG/DL
WBC # BLD AUTO: 8.3 THOUSAND/UL (ref 4.5–11)
WBC #/AREA URNS AUTO: ABNORMAL /HPF

## 2019-09-26 PROCEDURE — 99284 EMERGENCY DEPT VISIT MOD MDM: CPT | Performed by: PHYSICIAN ASSISTANT

## 2019-09-26 PROCEDURE — 93005 ELECTROCARDIOGRAM TRACING: CPT

## 2019-09-26 PROCEDURE — 71046 X-RAY EXAM CHEST 2 VIEWS: CPT

## 2019-09-26 PROCEDURE — 80053 COMPREHEN METABOLIC PANEL: CPT | Performed by: PHYSICIAN ASSISTANT

## 2019-09-26 PROCEDURE — 81001 URINALYSIS AUTO W/SCOPE: CPT | Performed by: FAMILY MEDICINE

## 2019-09-26 PROCEDURE — 99285 EMERGENCY DEPT VISIT HI MDM: CPT

## 2019-09-26 PROCEDURE — 85025 COMPLETE CBC W/AUTO DIFF WBC: CPT | Performed by: PHYSICIAN ASSISTANT

## 2019-09-26 PROCEDURE — 36415 COLL VENOUS BLD VENIPUNCTURE: CPT | Performed by: PHYSICIAN ASSISTANT

## 2019-09-26 PROCEDURE — 84443 ASSAY THYROID STIM HORMONE: CPT | Performed by: FAMILY MEDICINE

## 2019-09-26 PROCEDURE — 96360 HYDRATION IV INFUSION INIT: CPT

## 2019-09-26 PROCEDURE — 82948 REAGENT STRIP/BLOOD GLUCOSE: CPT

## 2019-09-26 PROCEDURE — 84484 ASSAY OF TROPONIN QUANT: CPT | Performed by: PHYSICIAN ASSISTANT

## 2019-09-26 RX ORDER — TRAZODONE HYDROCHLORIDE 100 MG/1
TABLET ORAL
Refills: 1 | COMMUNITY
Start: 2019-08-21 | End: 2020-03-28 | Stop reason: SDUPTHER

## 2019-09-26 RX ORDER — HYDROXYZINE PAMOATE 25 MG/1
CAPSULE ORAL
Refills: 1 | COMMUNITY
Start: 2019-08-21 | End: 2019-09-26

## 2019-09-26 RX ORDER — RISPERIDONE 3 MG/1
3 TABLET, FILM COATED ORAL
Refills: 1 | COMMUNITY
Start: 2019-08-21

## 2019-09-26 RX ORDER — LISINOPRIL 10 MG/1
10 TABLET ORAL DAILY
Refills: 0 | COMMUNITY
Start: 2019-08-21 | End: 2020-03-28 | Stop reason: SDUPTHER

## 2019-09-26 RX ORDER — BENZTROPINE MESYLATE 0.5 MG/1
0.5 TABLET ORAL 2 TIMES DAILY
Refills: 1 | COMMUNITY
Start: 2019-08-21 | End: 2020-03-28 | Stop reason: SDUPTHER

## 2019-09-26 RX ADMIN — SODIUM CHLORIDE 1000 ML: 0.9 INJECTION, SOLUTION INTRAVENOUS at 18:00

## 2019-09-26 NOTE — ED PROVIDER NOTES
History  Chief Complaint   Patient presents with    Hand Pain     Patient reports that for 1 hour he wasn't able to close his fist and felt numbness in both hands  Now resolved  61-year-old male past medical history of hyperlipidemia, psychiatric disorder, previous MI and CVA presenting for evaluation of near syncope  Patient is currently homeless and states that he was pulling his way along the street when he had a near syncopal event  Patient reports he had a stop and catch his balance  He went to the [employer] and was supplied with water and 3 pieces of pizza at that time  Patient states that this has happened previously in the past secondary to low blood sugar  He does not have access to concomitant or so he would like us to check his sugar today  He states that he thinks he is dehydrated  Patient denies any current headache dizziness chest pain shortness of breath abdominal pain nausea vomiting or diarrhea  Prior to Admission Medications   Prescriptions Last Dose Informant Patient Reported? Taking?    FLUoxetine (PROZAC) 20 mg capsule   Yes No   Sig: take 1 capsule (20MG)  by oral route QID   Levothyroxine Sodium (TIROSINT) 50 MCG CAPS   Yes No   Sig: every 24 hours   aspirin (ECOTRIN LOW STRENGTH) 81 mg EC tablet   Yes No   Sig: Take 1 tablet by mouth daily   aspirin 325 mg tablet   Yes No   Sig: every 24 hours   atorvastatin (LIPITOR) 10 mg tablet   Yes No   Sig: Take 1 tablet by mouth daily   baclofen 10 mg tablet   Yes No   Sig: Take by mouth   cyclobenzaprine (FLEXERIL) 10 mg tablet   No No   Sig: Take 1 tablet (10 mg total) by mouth 3 (three) times a day as needed for muscle spasms   cyclobenzaprine (FLEXERIL) 10 mg tablet   No No   Sig: Take 1 tablet (10 mg total) by mouth 3 (three) times a day as needed for muscle spasms   diclofenac (VOLTAREN) 25 MG EC tablet   No No   Sig: Take 1 tablet (25 mg total) by mouth 3 (three) times a day   diclofenac (VOLTAREN) 25 MG EC tablet   No No   Sig: Take 1 tablet (25 mg total) by mouth 3 (three) times a day   diphenhydrAMINE (BENADRYL) 25 mg capsule   Yes No   Sig: take 1 Capsule (25MG)  by oral mouth bid   gabapentin (NEURONTIN) 300 mg capsule   No No   Sig: Take 1 capsule (300 mg total) by mouth 3 (three) times a day   gabapentin (NEURONTIN) 300 mg capsule   No No   Sig: Take 1 capsule (300 mg total) by mouth 3 (three) times a day   glucose blood (ACCU-CHEK NATI) test strip   Yes No   Sig: by In Vitro route daily   hydrOXYzine HCL (ATARAX) 50 mg tablet   Yes No   Sig: Take 50 mg by mouth every 6 (six) hours   hydrochlorothiazide (HYDRODIURIL) 12 5 mg tablet   Yes No   Sig: every 24 hours   ibuprofen (MOTRIN) 800 mg tablet   No No   Sig: Take 1 tablet (800 mg total) by mouth every 6 (six) hours as needed (pain)   lisinopril (PRINIVIL) 5 mg tablet   Yes No   Sig: every 24 hours   loratadine (CLARITIN) 10 mg tablet   Yes No   Sig: every 24 hours   metFORMIN (GLUCOPHAGE) 500 mg tablet   No No   Sig: Take 1 tablet (500 mg total) by mouth 2 (two) times a day with meals   methocarbamol (ROBAXIN) 750 mg tablet   No No   Sig: Take 1-2 tablets (750-1,500 mg total) by mouth 4 (four) times a day Prn muscle spasm   rOPINIRole (REQUIP) 0 25 mg tablet   Yes No   Sig: Take 0 25 mg by mouth   sertraline (ZOLOFT) 100 mg tablet   Yes No   Sig: Take 1 tablet by mouth daily   traMADol (ULTRAM) 50 mg tablet   Yes No   Sig: Take 1 tablet by mouth every 8 (eight) hours as needed   traZODone (DESYREL) 50 mg tablet   Yes No   Sig: Take 1 tablet by mouth      Facility-Administered Medications: None       Past Medical History:   Diagnosis Date    Arthritis     Chronic pain     [facility]cere[facility] [address] [facility]     Hyperlipidemia     Malignant neoplasm of lung ([facility] [address] )     Last Assessed:  4/7/15    [facility] infarction [facility])     Last Assessed:  9/22/16    Psychiatric disorder        Past Surgical History:   Procedure Laterality Date    CHOLECYSTECTOMY      ELBOW SURGERY Right     ELBOW SURGERY Right     GALLBLADDER SURGERY         Family History   Problem Relation Age of Onset    Other Father         Cardiac disorder    Other Family         Cardiac disorder     I have reviewed and agree with the history as documented  Social History     Tobacco Use    Smoking status: Former Smoker    Smokeless tobacco: Never Used   Substance Use Topics    Alcohol use: No     Comment: Stopped drinking alcohol    Drug use: No     Comment: Per Allscripts:  History of drug use        Review of Systems   All other systems reviewed and are negative  Physical Exam  Physical Exam   Constitutional: He is oriented to person, place, and time  He appears well-developed and well-nourished  No distress  Appears dirty and unkept   HENT:   Head: Normocephalic and atraumatic  Eyes: Conjunctivae are normal    EOM grossly intact   Neck: Normal range of motion  Neck supple  No JVD present  Cardiovascular: Normal rate  Pulmonary/Chest: Effort normal    Abdominal: Soft  Musculoskeletal:   FROM, steady gait, cap refill brisk, strength and sensation grossly intact throughout   Neurological: He is alert and oriented to person, place, and time  Skin: Skin is warm and dry  Capillary refill takes less than 2 seconds  He is not diaphoretic  Psychiatric: He has a normal mood and affect  His behavior is normal    Nursing note and vitals reviewed        Vital Signs  ED Triage Vitals [09/26/19 1728]   Temperature Pulse Respirations Blood Pressure SpO2   98 6 °F (37 °C) 74 18 93/58 97 %      Temp Source Heart Rate Source Patient Position - Orthostatic VS BP Location FiO2 (%)   Tympanic Monitor Lying Left arm --      Pain Score       --           Vitals:    09/26/19 1728   BP: 93/58   Pulse: 74   Patient Position - Orthostatic VS: Lying         Visual Acuity  Visual Acuity      Most Recent Value   L Pupil Size (mm)  3   R Pupil Size (mm)  3          ED Medications  Medications   sodium chloride 0 9 % bolus 1,000 mL (1,000 mL Intravenous New Bag 9/26/19 1800)       Diagnostic Studies  Results Reviewed     Procedure Component Value Units Date/Time    Troponin I [[id number]]  (Normal) Collected:  09/26/19 1754    Lab Status:  Final result Specimen:  Blood from Arm, Right Updated:  09/26/19 1824     Troponin I <0 01 ng/mL     Comprehensive metabolic panel [[id number]]  (Abnormal) Collected:  09/26/19 1754    Lab Status:  Final result Specimen:  Blood from Arm, Right Updated:  09/26/19 1816     Sodium 135 mmol/L      Potassium 4 5 mmol/L      Chloride 94 mmol/L      CO2 26 mmol/L      ANION GAP 15 mmol/L      BUN 34 mg/dL      Creatinine 2 23 mg/dL      Glucose 70 mg/dL      Calcium 10 1 mg/dL      AST 30 U/L      ALT 29 U/L      Alkaline Phosphatase 59 U/L      Total Protein 8 2 g/dL      Albumin 5 0 g/dL      Total Bilirubin 0 90 mg/dL      eGFR 32 ml/min/1 73sq m     Narrative:       National Kidney Disease Foundation guidelines for Chronic Kidney Disease (CKD):     Stage 1 with normal or high GFR (GFR > 90 mL/min/1 73 square meters)    Stage 2 Mild CKD (GFR = 60-89 mL/min/1 73 square meters)    Stage 3A Moderate CKD (GFR = 45-59 mL/min/1 73 square meters)    Stage 3B Moderate CKD (GFR = 30-44 mL/min/1 73 square meters)    Stage 4 Severe CKD (GFR = 15-29 mL/min/1 73 square meters)    Stage 5 End Stage CKD (GFR <15 mL/min/1 73 square meters)  Note: GFR calculation is accurate only with a steady state creatinine    CBC and differential [[id number]]  (Abnormal) Collected:  09/26/19 1754    Lab Status:  Final result Specimen:  Blood from Arm, Right Updated:  09/26/19 1808     WBC 8 30 Thousand/uL      RBC 4 64 Million/uL      Hemoglobin 14 2 g/dL      Hematocrit 40 9 %      MCV 88 fL      MCH 30 7 pg      MCHC 34 9 g/dL      RDW 13 6 %      MPV 8 2 fL      Platelets 941 Thousands/uL      Neutrophils Relative 86 %      Lymphocytes Relative 9 %      Monocytes Relative 4 %      Eosinophils Relative 0 % Basophils Relative 0 %      Neutrophils Absolute 7 10 Thousands/µL      Lymphocytes Absolute 0 80 Thousands/µL      Monocytes Absolute 0 40 Thousand/µL      Eosinophils Absolute 0 00 Thousand/µL      Basophils Absolute 0 00 Thousands/µL                  XR chest 2 views    (Results Pending)              Procedures  Procedures       ED Course  ED Course as of Sep 26 1900   Thu Sep 26, 2019   1842 Will call for JARED and dehydration, gfr was 79 in august 2019 and now is 28                                  MDM  Number of Diagnoses or Management Options  Acute kidney injury [facility]):   Dehydration:   Diagnosis management comments: 53-year-old male presenting for evaluation of near syncopal episode earlier today, patient is in JARED, >50% GFR decreased to 32 from 79 in august of 2019, will admit for IVF, spoke with jackie family medicine resident who accepted admission    Portions of the record may have been created with voice recognition software  Occasional wrong word or "sound a like" substitutions may have occurred due to the inherent limitations of voice recognition software  Read the chart carefully and recognize, using context, where substitutions have occurred  Disposition  Final diagnoses:   Acute kidney injury ([address] )   Dehydration     Time reflects when diagnosis was documented in both MDM as applicable and the Disposition within this note     Time User Action Codes Description Comment    9/26/2019  6:54 PM Nannette Rea Add [N17 9] Acute kidney injury ([address] )     9/26/2019  6:54 PM Nannette Rea Add [E86 0] Dehydration       ED Disposition     ED Disposition Condition Date/Time Comment    Admit Stable Thu Sep 26, 2019  6:53 PM Case was discussed with family medicine resident and the patient's admission status was agreed to be Admission Status: observation status to the service of Dr Noel Given           Follow-up Information    None         Patient's Medications   Discharge Prescriptions    No medications on file     No discharge procedures on file      ED Provider  Electronically Signed by           Coby Velasco PA-C  09/26/19 6889

## 2019-09-27 VITALS
BODY MASS INDEX: 28.49 KG/M2 | RESPIRATION RATE: 18 BRPM | TEMPERATURE: 97.2 F | OXYGEN SATURATION: 96 % | SYSTOLIC BLOOD PRESSURE: 114 MMHG | DIASTOLIC BLOOD PRESSURE: 78 MMHG | HEART RATE: 63 BPM | HEIGHT: 66 IN | WEIGHT: 177.25 LBS

## 2019-09-27 PROBLEM — R55 NEAR SYNCOPE: Status: RESOLVED | Noted: 2019-09-26 | Resolved: 2019-09-27

## 2019-09-27 PROBLEM — N17.9 AKI (ACUTE KIDNEY INJURY) (HCC): Status: RESOLVED | Noted: 2019-09-26 | Resolved: 2019-09-27

## 2019-09-27 PROBLEM — E86.0 DEHYDRATION: Status: RESOLVED | Noted: 2019-09-26 | Resolved: 2019-09-27

## 2019-09-27 LAB
ANION GAP SERPL CALCULATED.3IONS-SCNC: 7 MMOL/L (ref 5–14)
ATRIAL RATE: 65 BPM
BUN SERPL-MCNC: 26 MG/DL (ref 5–25)
CALCIUM SERPL-MCNC: 9.4 MG/DL (ref 8.4–10.2)
CHLORIDE SERPL-SCNC: 100 MMOL/L (ref 97–108)
CO2 SERPL-SCNC: 28 MMOL/L (ref 22–30)
CREAT SERPL-MCNC: 1.37 MG/DL (ref 0.7–1.5)
GFR SERPL CREATININE-BSD FRML MDRD: 57 ML/MIN/1.73SQ M
GLUCOSE SERPL-MCNC: 111 MG/DL (ref 70–99)
P AXIS: 19 DEGREES
POTASSIUM SERPL-SCNC: 3.8 MMOL/L (ref 3.6–5)
PR INTERVAL: 154 MS
QRS AXIS: 27 DEGREES
QRSD INTERVAL: 74 MS
QT INTERVAL: 402 MS
QTC INTERVAL: 418 MS
SODIUM SERPL-SCNC: 135 MMOL/L (ref 137–147)
T WAVE AXIS: 34 DEGREES
TSH SERPL DL<=0.05 MIU/L-ACNC: 2.8 UIU/ML (ref 0.47–4.68)
VENTRICULAR RATE: 65 BPM

## 2019-09-27 PROCEDURE — 93010 ELECTROCARDIOGRAM REPORT: CPT | Performed by: INTERNAL MEDICINE

## 2019-09-27 PROCEDURE — 90682 RIV4 VACC RECOMBINANT DNA IM: CPT | Performed by: FAMILY MEDICINE

## 2019-09-27 PROCEDURE — 80048 BASIC METABOLIC PNL TOTAL CA: CPT | Performed by: FAMILY MEDICINE

## 2019-09-27 PROCEDURE — 99220 PR INITIAL OBSERVATION CARE/DAY 70 MINUTES: CPT | Performed by: FAMILY MEDICINE

## 2019-09-27 PROCEDURE — NC001 PR NO CHARGE: Performed by: FAMILY MEDICINE

## 2019-09-27 RX ORDER — FLUOXETINE HYDROCHLORIDE 20 MG/1
20 CAPSULE ORAL DAILY
Qty: 30 CAPSULE | Refills: 0 | Status: SHIPPED | OUTPATIENT
Start: 2019-09-27 | End: 2020-03-28 | Stop reason: SDUPTHER

## 2019-09-27 RX ADMIN — SODIUM CHLORIDE 1000 ML: 0.9 INJECTION, SOLUTION INTRAVENOUS at 00:10

## 2019-09-27 RX ADMIN — INFLUENZA A VIRUS A/BRISBANE/02/2018 (H1N1) RECOMBINANT HEMAGGLUTININ ANTIGEN, INFLUENZA A VIRUS A/KANSAS/14/2017 (H3N2) RECOMBINANT HEMAGGLUTININ ANTIGEN, INFLUENZA B VIRUS B/PHUKET/3073/2013 RECOMBINANT HEMAGGLUTININ ANTIGEN, AND INFLUENZA B VIRUS B/MARYLAND/15/2016 RECOMBINANT HEMAGGLUTININ ANTIGEN 0.5 ML: 45; 45; 45; 45 INJECTION INTRAMUSCULAR at 09:40
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
## 2019-09-27 NOTE — ED NOTES
Urine post residual= 87    Dr Camillia Mohs at bedside      [facility], [facility]  09/26/19 2017

## 2019-09-27 NOTE — NURSING NOTE
Discharge instructions given to the patient both written and verbally with details regarding next dose due, when to take the next med, and where to  medications  Also to /u with his doctor in one week  D/C IV by me with catheter intact  Dressed in street clothes  Personal belongings packed by patient  Calling for volunteer

## 2019-09-27 NOTE — DISCHARGE INSTRUCTIONS
Acute Kidney Injury   WHAT YOU NEED TO KNOW:   What is acute kidney injury? Acute kidney injury (JARED) is also called acute kidney failure, or acute renal failure  JARED happens when your kidneys suddenly stop working correctly  Normally, the kidneys remove fluid, chemicals, and waste from your blood  These wastes are turned into urine by your kidneys  JARED usually happens over hours or days  When you have JARED, your kidneys do not remove the waste, chemicals, or extra fluid from your body  A normal amount of urine is not produced  JARED is usually temporary, but it may become a chronic kidney condition  What causes JARED? · Decreased blood flow to the kidney, such as from hypercalcemia (high blood calcium level) or severe heart disease     · A disease or condition that affects the kidneys, such as hypertension (high blood pressure) or diabetes     · A blockage in the kidney or ureter, such as a kidney or bladder stone, enlarged prostate, or tumor  What increases my risk for JARED? · Being hospitalized with a serious illness, such as sepsis or severe burns    · Peripheral artery disease    · Older age in adults    · Kidney or liver diseases    · Medical conditions such as dehydration, hypertension, diabetes, or heart failure    · Certain medicines such as NSAIDs  What are the signs and symptoms of JARED? You may not have any symptoms with early or mild JARED  As JARED progresses, you may have any of the following:  · Decrease in the amount of urine or no urination    · Swelling in your arms, legs, or feet     · Weakness, drowsiness, or no appetite    · Nausea, flank pain, muscle twitching or muscle cramps    · Itchy skin, or your breath or body smells like urine    · Behavior changes, confusion, disorientation, or seizures  How is JARED diagnosed? There are many causes of JARED   To find the cause and to treat your JARED correctly, your healthcare provider may do any of the following:  · Blood and urine tests  show how well your kidneys are working  They may also show the cause of your JARED  · An x-ray or ultrasound  may show problems with your kidneys  Your healthcare provider may see a blockage in your kidneys  He or she may see narrowing of the artery that sends blood to your kidneys  You may be given contrast liquid to help your kidneys show up better in the pictures  Tell the healthcare provider if you have ever had an allergic reaction to contrast liquid  How is JARED treated? Treatment depends upon the cause of your acute kidney injury and how severe it is  Usually, JARED will be monitored in the hospital  If you have mild JARED, you may be able to go home to recover  Your healthcare providers will treat the cause of your JARED  You may need IV fluids if your JARED was caused by little or no fluid in your body  You may need dialysis to remove waste and extra fluid from your body  Your healthcare provider may tell you to eat food low in sodium (salt), potassium, phosphorus, or protein  You may need to see a dietitian before you are discharged to get help with planning your meals  How can I prevent JARED? · Manage other health conditions  such as diabetes, high blood pressure, or heart disease  These conditions increase your risk for acute kidney injury  Take your medicines for these conditions as directed  Also, monitor your blood sugar and blood pressure levels as directed  Contact your healthcare provider if your levels are not in the range he or she says it should be  · Talk to your healthcare provider before you take over-the-counter-medicine  NSAIDs, stomach medicine, or laxatives may harm your kidneys and increase your risk for acute kidney injury  If it is okay to take the medicine, follow the directions on the package  Do not take more than directed  · Tell healthcare providers if you have had JARED  before you get contrast liquid for an x-ray or CT scan   Your healthcare provider may give you medicine to prevent kidney problems caused by the liquid  CARE AGREEMENT:   You have the right to help plan your care  Learn about your health condition and how it may be treated  Discuss treatment options with your caregivers to decide what care you want to receive  You always have the right to refuse treatment  The above information is an  only  It is not intended as medical advice for individual conditions or treatments  Talk to your doctor, nurse or pharmacist before following any medical regimen to see if it is safe and effective for you  © 2017 [address] Information is for End User's use only and may not be sold, redistributed or otherwise used for commercial purposes  All illustrations and images included in CareNotes® are the copyrighted property of [employer]  or [employer]  Dehydration   WHAT YOU NEED TO KNOW:   What is dehydration? Dehydration is a condition that develops when your body does not have enough fluid  You may become dehydrated if you do not drink enough water or lose too much fluid  Fluid loss may also cause loss of electrolytes (minerals), such as sodium  What increases my risk for dehydration? · Vomiting, diarrhea, or fever    · Being in the sun or heat for too long    · Sweating while playing sports    · Diseases, such as stroke, diabetes, or infections    · Medicines that cause you to lose water and salt, such as diuretics (water pills)    · Older age with decreased ability to sense thirst or to urinate  What are the signs and symptoms of dehydration? · Dry eyes or mouth    · Increased thirst    · Dark yellow urine    · Urinating little or not at all    · Tiredness or body weakness    · Headache, dizziness, or confusion    · Irregular or fast breathing, fast or pounding heartbeat, and low blood pressure    · Sudden weight loss  How is dehydration diagnosed? Your healthcare provider will examine you and check your breathing and heartbeat  He or she will look at your eyes, skin, mouth, and tongue  He or she will ask you how much liquid you have been drinking, and how much you are urinating  Tell him or her if you have been vomiting or have diarrhea  Blood and urine tests are used to check your electrolyte levels  The tests may show the cause of your dehydration, such as infection or diabetes  They may also show if your kidneys are working correctly  How is dehydration treated? · Oral liquids:      ¨ If you are mildly to moderately dehydrated, you may need an oral rehydration solution (ORS)  This drink contains the right amount of salt, sugar, and minerals in water to replace body fluids  Ask your healthcare provider where you can get an ORS  ¨ Drink an ORS in small amounts if you have been vomiting  If you vomit, wait 30 minutes and try again  Ask healthcare providers how much ORS you need when you are dehydrated and how often you should drink it  ¨ A sports drink is not  the same as an ORS  Do not drink sports drinks without asking your healthcare provider  ¨ Do not drink soft drinks or fruit juices  These can make your condition worse  · You may receive fluid through an IV  Electrolytes may also be included in the fluid  · Hypodermoclysis gives your body a large amount of water quickly  The water is given into the deepest layer of your skin  Ask your healthcare provider for more information about hypodermoclysis  What can I do to prevent dehydration? · Drink liquids as directed  Liquids that contain water, sugar, and minerals can help your body hold in fluid and help prevent dehydration  Drink liquids throughout the day, not just when you feel thirsty  Men should drink about 3 liters (13 eight-ounce cups) of liquid each day  Women should drink about 2 liters (9 eight-ounce cups) of liquid each day  Drink even more liquid if you will be outdoors, in the sun for a long time, or exercising  · Stay cool    Limit the time you spend outdoors during the hottest part of the day  Dress in lightweight clothes  · Keep track of how often you urinate  If you urinate less than usual or your urine is darker, drink more liquids  When should I seek immediate care? · You have a seizure  · You are confused or cannot think clearly  · You are extremely sleepy, or another person cannot wake you  · You become dizzy or faint when you stand  · You are not able to urinate  · You have trouble breathing  · You have a fast or irregular heartbeat  · Your hands or feet are cold, or your face is pale  When should I contact my healthcare provider? · You have trouble drinking liquids because you are vomiting  · Your symptoms get worse  · You have a fever  · You feel very weak or tired  · You have questions or concerns about your condition or care  CARE AGREEMENT:   You have the right to help plan your care  Learn about your health condition and how it may be treated  Discuss treatment options with your caregivers to decide what care you want to receive  You always have the right to refuse treatment  The above information is an  only  It is not intended as medical advice for individual conditions or treatments  Talk to your doctor, nurse or pharmacist before following any medical regimen to see if it is safe and effective for you  © 2017 [address] Information is for End User's use only and may not be sold, redistributed or otherwise used for commercial purposes  All illustrations and images included in CareNotes® are the copyrighted property of [employer] , Inc  or [employer]

## 2019-09-27 NOTE — NURSING NOTE
Patient Instructions by Sadi Aguilar MD at 02/28/17 08:28 AM     Author:  Sadi Aguilar MD Service:  (none) Author Type:  Physician     Filed:  02/28/17 08:29 AM Encounter Date:  2/28/2017 Status:  Signed     :  Sadi Aguilar MD (Physician)              Patient education: Urinary tract infections in adults     From Up TO Date-Literature review current through: Nov 2016     The urinary tract includes the kidneys (which filter the blood to produce urine), the ureters (the tubes that carry urine from the kidneys to the bladder), the bladder (which stores urine), and the urethra (the tube that carries urine from the bladder to the outside) Urinary tract infections happen when bacteria get into the urethra and travel up into the bladder. If the infection stays just in the bladder, it is a called a bladder infection, or \"cystitis.\" If the infection travels up past the bladder and into the kidneys, it is called a kidney infection or \"pyelonephritis.\"    Bladder infections are one of the most common infections, causing symptoms of burning with urination and needing to urinate frequently. Kidney infections are less common than bladder infections, and they can cause similar symptoms, but they can also cause fever, back pain, and nausea or vomiting.     Both bladder and kidney infections are more common in women than men. Most women have an uncomplicated bladder infection that is easily treated with a short course of antibiotics. In men, bladder infections may also affect the prostate gland, and a longer course of treatment may be needed. Kidney infections can also usually be treated at home with antibiotics, but treatment typically lasts longer. In some cases, kidney infections must be treated in the hospital.     This discussion will focus on bladder and kidney infections in a healthy adult. Urinary tract infections in children are discussed in a separate article.    URINARY TRACT INFECTION  Received pt report from [id number] in the ED  Pt is AAO x4 and pt has no complaints of pain at this time  Pt has no edema and has palpable pulses in all extremities  Pt lung sounds are clear and heart tones are regular  Bed is low and locked, call bell is in reach, will continue to monitor   Dash Mayers RN CAUSES -- Bacteria do not normally live in the urinary tract, but they do live close to the urethra in women and men who are not circumcised. Urinary tract infections occur when these bacteria get into the urethra and travel up into the urinary tract.  Factors that increase the risk of developing a urinary tract infection include:  ?Having sex frequently or having a new sex partner  ?Having diabetes  ?Having a bladder or kidney infection in the past 12 months  ?Using a spermicide for birth control    In men, not being circumcised or having anal sex increases the risk of bladder infections.  In men and women, having a condition (such as kidney stones or ureteral reflux) that blocks or changes the flow of urine in the kidneys increases the risk of a kidney infection.   There is increasing evidence that there is a genetic predisposition to urinary tract infections.          BLADDER INFECTION SYMPTOMS -- The typical symptoms of a bladder infection include:  ?Pain or burning when urinating  ?Frequent need to urinate  ?Urgent need to urinate  ?Blood in the urine  ?Discomfort in the lower abdomen  Is it a bladder infection or something else? -- Burning with urination can also occur in women with vaginal infections (such as a yeast infection) or in people with urethritis (inflammation of the urethra). For this reason, it is important to call your healthcare provider before assuming you have a bladder infection.  KIDNEY INFECTION SYMPTOMS -- Kidney infections can sometimes cause the same symptoms as those of a bladder infection (listed above), but they can also cause:  ?Fever (temperature higher than 100.4º F or 38º C)  ?Pain in the flank (one or both sides of the lower back, where the kidneys are located)  ?Nausea or vomiting    If you have one or more of the symptoms of a kidney infection, you should see a healthcare provider as soon as possible. Although most kidney infections do not cause permanent damage, delaying  treatment can lead to serious complications.    URINARY TRACT INFECTION DIAGNOSIS -- Urinary tract infections are usually diagnosed based upon the person’s symptoms, and the results of a urine test that checks for the presence of white blood cells in the urine. White blood cells are responsible for fighting infection, so their presence in the urine suggests infection.   Urine culture -- A urine culture is a test that uses a sample of urine to try and grow bacteria in a laboratory. It usually requires about 48 hours to get results.   The best way to confirm an infection is to have a urine culture. However, if your symptoms are typical for bladder infection and you do not have vaginal irritation or discharge, it is very likely that you have a urinary tract infection. In such cases, your provider will usually treat you without ordering a urine culture. On the other hand, a urine culture should be performed for anyone suspected of having a kidney infection.     In people suspected to have a simple bladder infection, urine culture is often recommended :  TO find out which bacteria is causing the infection and to give a clear idea of the next medication to use if the bacteria is resistent to the currently used medication    Also:  ?Have never had a bladder infection before  ?Have symptoms that are not typical for bladder infection  ?Have had \"resistant\" bladder infections before (meaning the infections did not get better with standard antibiotics)  ?Have frequent bladder infections  ?Do not begin to feel better within 24 to 48 hours after starting antibiotics  ?Are pregnant    BLADDER INFECTION TREATMENT  Bladder infection -- In young, healthy adolescents and adults with a bladder infection, the usual treatment includes a single dose to a five-day course of antibiotics. The typical drugs chosen are: trimethoprim-sulfamethoxazole (Bactrim), nitrofurantoin (Macrobid). ciprofloxacin (Cipro) or levofloxacin (Levaquin).  In  men, the infection may involve the prostate gland and treatment is usually given for at least seven days.    Your symptoms should begin to resolve within one day after starting treatment. It is important to take the full course of antibiotics to completely eliminate the infection. If your symptoms persist for more than two or three days after starting treatment, call your healthcare provider.    If needed, you can take a prescription medication that numbs the bladder and urethra (phenazopyridine [Pyridium]) to reduce the burning pain of some UTIs. A similar medication is available over the counter without a prescription (eg, Uristat).     Both medications change the color of the urine (usually to orange or red), can interfere with laboratory testing, and may stain fabric and contact lenses. You should not take these medications for more than 48 hours, as there is no proven benefit beyond 48 hours and side effects may increase. These medications do not treat the urinary tract infection and must be taken along with an antibiotic.  Some providers recommend drinking more fluids while treating bladder infections to help flush bacteria from the bladder. No studies have been performed to address this issue.    There are also no good studies on the effectiveness of cranberry juice for treating a bladder infection; we do not recommend using cranberry juice to treat bladder infections.    Follow-up care -- Follow-up testing is not needed in healthy, young men or women with a bladder infection if symptoms resolve. Pregnant women are usually asked to have a repeat urine culture one to two weeks after treatment has ended to make sure the bacteria are no longer in the urine.    KIDNEY INFECTION TREATMENT -- The optimal treatment for a kidney infection depends upon the severity of the infection and the person’s general health    Home treatment -- If your fever and pain are mild and you are able to eat and drink, you will probably  be given a one to two week course of antibiotics to take by mouth at home. The first dose of antibiotics may be given as an injection in the office, clinic, or emergency department. Let your healthcare provider know if you do not begin to feel better within one to two days after starting treatment.    For fever and pain, you can take a nonprescription medication like acetaminophen (eg, Tylenol and others) or ibuprofen (Motrin, Advil).  Hospital treatment -- If you have a high fever, severe pain, or cannot keep down food/fluids, you will need to be hospitalized and given intravenous (IV) antibiotics and fluids. As you begin to improve, you will be allowed to go home and continue taking oral antibiotics there.      RECURRENT BLADDER INFECTIONS  Bladder infections versus other causes -- Some adults, especially women, develop bladder infections frequently. In this case, it is important to confirm at least once that your symptoms (eg, burning, frequency, and urgency) are caused by a bladder infection. Symptoms are usually similar from one infection to another. As noted above, the best way to confirm an infection is to have a urine culture. However, if your symptoms are typical for bladder infection and you do not have vaginal irritation or discharge, your provider will usually treat you without ordering a urine culture.     If a urine culture is performed and is negative for infection, other causes of pain, burning, and frequency should be considered. However, some urinary tract infections are caused by small quantities of bacteriuria that may not be detected on a typical urine culture. Therefore, in some cases your provider may choose to continue you on antibiotics even if the culture is reported to be negative. (See     Need for further testing -- If you continue to develop bladder infections, you may require further testing. This is especially true if there is a chance you could have an abnormality in the kidneys,  ureter, bladder, or urethra    Tests for these conditions may include a computed tomography (CT) scan, ultrasound, or cystoscopy (looking inside the bladder with a thin, lighted telescope-like instrument).    If you continue to notice blood in your urine after your bladder infection has cleared, you should have further testing.     Preventing recurrent UTIs -- Women with recurrent urinary tract infections may be advised to take steps to prevent bladder infections, including one or more of the following:    Changes in birth control -- Women who develop frequent bladder infections and use spermicides, particularly those who also use a diaphragm, may be encouraged to use an alternate method of birth control.       Cranberry products -- Taking cranberry juice, cranberry tablets, or D-mannose has been promoted as one way to help prevent frequent bladder infections. However, several studies demonstrate no benefit with cranberry, and those studies showing that cranberry and D-mannose reduce the risk of recurrent bladder infections are not convincing.     Drinking more fluid and urinating after intercourse -- Although studies have not proven that drinking more fluids or urinating soon after intercourse can prevent infection, some healthcare providers recommend these measures since they are not harmful. Drinking more fluid may help to wash out bacteria that enter the bladder.    Postmenopausal women -- Postmenopausal women who develop recurrent bladder infections may benefit from using vaginal estrogen. Vaginal estrogen is available in a flexible ring that is worn in the vagina for three months (eg, Estring), a small tablet (Vagifem), or a cream (eg, Premarin or Estrace). Vaginal estrogen is discussed in more detail in a separate topic review.    Antibiotics -- A preventive antibiotic treatment may be recommended if you repeatedly develop bladder infections and have not responded to other preventive measures. Antibiotics are  highly effective in preventing recurrent bladder infections but can cause side effects and promote the growth of resistant bacteria, which are more difficult to treat if they cause subsequent urinary tract infections. Therefore, antibiotics for preventing urinary tract infections should only be considered after trying the above preventive approaches. Preventive antibiotics can be taken in several different ways:  ?Continuous antibiotics - You can take a low dose of an antibiotic once per day or three times per week for several months to several years.  ?Antibiotics following intercourse - In women who develop urinary tract infections after sex, taking a single low dose antibiotic after intercourse can help to prevent bladder infections. This usually results in less antibiotic use than continuous antibiotics.  ?Self-treatment - A plan to begin antibiotics at the first sign of a bladder infection may be recommended in some situations. Before starting this regimen, it is important that you have had testing (urine cultures) to confirm that your symptoms are caused by a bladder infection; some people have symptoms of a bladder infection but do not actually have an infection.     Macrobid as ordered. Eat yogurt or take a probiotic to protect intestinal and genital tracts.       Pyridium may stain contacts and turn urine and tears orange.       Revision History        User Key Date/Time User Provider Type Action    > [N/A] 02/28/17 08:29 AM Sadi Aguilar MD Physician Sign

## 2019-09-27 NOTE — DISCHARGE SUMMARY
Discharge Summary - Willie Amaral    Patient Information: Jazz Moon 64 y o  male MRN: [id number]  Unit/Bed#: 7T [facility] 711-01 Encounter: [id number]    Discharging Physician / Practitioner: Kaylah Chappell MD  PCP: Carlos Arce PA-C  Admission Date:   Admission Orders (From admission, onward)     Ordered        09/26/19 1854  Place in Observation  Once                   Discharge Date: 09/27/19    Reason for Admission: JARED, Dehydration, Presyncope    Discharge Diagnoses:     Principal Problem:    JARED (acute kidney injury) ([facility] [address] )  Active Problems:    Near syncope    Dehydration    Microscopic hematuria  Resolved Problems:    * No resolved hospital problems  *      Consultations During Hospital Stay:  · none    Procedures Performed:   · None    Significant Findings / Test Results:   · Microscopic hematuria on urinalysis   · 13 lb weight loss in the last 9 months  ·     Incidental Findings:   · Left non-tender posterior cervical lymphadenopathy  ·     Test Results Pending at Discharge (will require follow up): · Repeat urinalysis     Outpatient Tests Requested:  · Urology follow-up outpatient    Complications:  None    Hospital Course:     Jazz Moon is a homless 64 y o  male patient who originally presented to the hospital on 9/26/2019 due to near syncope, and pain and stiffness in the hands  In the ED, He admits to decreased oral hydration and urinary output, stating that he only drank one 24 oz bottle of water the morning of admission and only urinated once  He also complained of watery stools  CMP showed the patient had JARED  He then received 1L bolus of NS 0 9%  Upon admission, he received another 1 L bolus of NS 0 9%  Urinalysis showed trace protein and hyaline casts consistent with dehydration  It also showed 1-2 RBCs which has been chronically present on previous urinalyses  Based on previous history of tobacco use, patient is recommend to follow up with Urology outpatient  Condition at Discharge: good     Discharge Day Visit / Exam:     Vitals: Blood Pressure: 109/68 (09/27/19 0006)  Pulse: 74 (09/27/19 0006)  Temperature: 99 2 °F (37 3 °C) (09/27/19 0006)  Temp Source: Temporal (09/27/19 0006)  Respirations: 18 (09/27/19 0006)  Height: 5' 6" (167 6 cm) (09/26/19 2055)  Weight - Scale: 83 2 kg (183 lb 6 8 oz) (09/26/19 2055)  SpO2: 97 % (09/27/19 0006)  Exam:   Physical Exam   Constitutional: He is oriented to person, place, and time  He appears well-developed and well-nourished  No distress  HENT:   Head: Normocephalic and atraumatic  Right Ear: External ear normal    Left Ear: External ear normal    Nose: Nose normal    Mouth/Throat: Oropharynx is clear and moist    Eyes: Pupils are equal, round, and reactive to light  EOM are normal    Neck: Normal range of motion  Neck supple  Cardiovascular: Normal heart sounds and intact distal pulses  Pulmonary/Chest: Effort normal and breath sounds normal  No respiratory distress  Abdominal: Soft  Bowel sounds are normal  He exhibits no distension  There is no tenderness  There is no guarding  Musculoskeletal: Normal range of motion  He exhibits no edema or tenderness  Lymphadenopathy:     He has cervical adenopathy ( left side)  Neurological: He is alert and oriented to person, place, and time  Skin: Skin is warm and dry  No rash noted  He is not diaphoretic  Psychiatric: He has a normal mood and affect  His behavior is normal          Discussion with Family:  None     Discharge instructions/Information to patient and family:   See after visit summary for information provided to patient and family  Discharge Medications:     Prior to Admission Medications   Prescriptions Last Dose Informant Patient Reported? Taking?    FLUoxetine (PROZAC) 20 mg capsule     Yes No   Sig: take 1 capsule (20MG)  by oral route QID   Levothyroxine Sodium (TIROSINT) 50 MCG CAPS     Yes No   Sig: every 24 hours   aspirin (ECOTRIN LOW STRENGTH) 81 mg EC tablet     Yes No   Sig: Take 1 tablet by mouth daily   aspirin 325 mg tablet     Yes No   Sig: every 24 hours   atorvastatin (LIPITOR) 10 mg tablet     Yes No   Sig: Take 1 tablet by mouth daily   baclofen 10 mg tablet     Yes No   Sig: Take by mouth   cyclobenzaprine (FLEXERIL) 10 mg tablet     No No   Sig: Take 1 tablet (10 mg total) by mouth 3 (three) times a day as needed for muscle spasms   cyclobenzaprine (FLEXERIL) 10 mg tablet     No No   Sig: Take 1 tablet (10 mg total) by mouth 3 (three) times a day as needed for muscle spasms   diclofenac (VOLTAREN) 25 MG EC tablet     No No   Sig: Take 1 tablet (25 mg total) by mouth 3 (three) times a day   diclofenac (VOLTAREN) 25 MG EC tablet     No No   Sig: Take 1 tablet (25 mg total) by mouth 3 (three) times a day   diphenhydrAMINE (BENADRYL) 25 mg capsule     Yes No   Sig: take 1 Capsule (25MG)  by oral mouth bid   gabapentin (NEURONTIN) 300 mg capsule     No No   Sig: Take 1 capsule (300 mg total) by mouth 3 (three) times a day   gabapentin (NEURONTIN) 300 mg capsule     No No   Sig: Take 1 capsule (300 mg total) by mouth 3 (three) times a day   glucose blood (ACCU-CHEK NATI) test strip     Yes No   Sig: by In Vitro route daily   hydrOXYzine HCL (ATARAX) 50 mg tablet     Yes No   Sig: Take 50 mg by mouth every 6 (six) hours   hydrochlorothiazide (HYDRODIURIL) 12 5 mg tablet     Yes No   Sig: every 24 hours   ibuprofen (MOTRIN) 800 mg tablet     No No   Sig: Take 1 tablet (800 mg total) by mouth every 6 (six) hours as needed (pain)   lisinopril (PRINIVIL) 5 mg tablet     Yes No   Sig: every 24 hours   loratadine (CLARITIN) 10 mg tablet     Yes No   Sig: every 24 hours   metFORMIN (GLUCOPHAGE) 500 mg tablet     No No   Sig: Take 1 tablet (500 mg total) by mouth 2 (two) times a day with meals   methocarbamol (ROBAXIN) 750 mg tablet     No No   Sig: Take 1-2 tablets (750-1,500 mg total) by mouth 4 (four) times a day Prn muscle spasm   rOPINIRole (REQUIP) 0 25 mg tablet     Yes No   Sig: Take 0 25 mg by mouth   sertraline (ZOLOFT) 100 mg tablet     Yes No   Sig: Take 1 tablet by mouth daily   traMADol (ULTRAM) 50 mg tablet     Yes No   Sig: Take 1 tablet by mouth every 8 (eight) hours as needed   traZODone (DESYREL) 50 mg tablet     Yes No   Sig: Take 1 tablet by mouth              Provisions for Follow-Up Care:  See after visit summary for information related to follow-up care and any pertinent home health orders  Disposition:     Home    For Discharges to [facility] SNF:   · Not Applicable to this Patient - Not Applicable to this Patient    Planned Readmission:  None     Discharge Statement:  I spent 30 minutes discharging the patient  This time was spent on the day of discharge  I had direct contact with the patient on the day of discharge  Greater than 50% of the total time was spent examining patient, answering all patient questions, arranging and discussing plan of care with patient as well as directly providing post-discharge instructions  Additional time then spent on discharge activities      ** Please Note: This note has been constructed using a voice recognition system **

## 2019-09-27 NOTE — ASSESSMENT & PLAN NOTE
Urinalysis positive for 1-2/hpf RBCs  Previous urinalyses positive for 0-1/hpf RBCs  1 pack per day smoker 20+ years  Quit in 2004  Additional risk factor of charted 13 lb weight loss in the last 9 months    - Outpatient follow up with Urology

## 2019-09-27 NOTE — ASSESSMENT & PLAN NOTE
No fall; currently asymptomatic  Most likely related to dehydration, as patient is homeless and does not have regular access to water  Positive orthostatics:  BP:  124/67 HR 71 lying  101/69 HR 73 standing  - 1 bolus of normal saline in the ER  - 1 more bolus of normal saline for a total of 2 boluses    - monitor urine output post rehydration  - monitor VS

## 2019-09-27 NOTE — ASSESSMENT & PLAN NOTE
Patient admits to decreased oral hydration stating that he only drank one 24 oz bottle of water today and usually does not have regular access to water  Dehydration is the most likely culprit for JARED and presyncope experience by patient  Positive orthostatics  - Patient received 1L bolus of NS 0 9% in the ER  - Will receive a 2nd bolus of NS 0 9% for a total of 2 L   - Monitor urinary output  - Repeat BMP in the morning, monitor vital signs

## 2019-09-27 NOTE — PLAN OF CARE
Problem: Potential for Falls  Goal: Patient will remain free of falls  Description  INTERVENTIONS:  - Assess patient frequently for physical needs  -  Identify cognitive and physical deficits and behaviors that affect risk of falls    -  [place] fall precautions as indicated by assessment   - Educate patient/family on patient safety including physical limitations  - Instruct patient to call for assistance with activity based on assessment  - Modify environment to reduce risk of injury  - Consider OT/PT consult to assist with strengthening/mobility  Outcome: Progressing     Problem: PAIN - ADULT  Goal: Verbalizes/displays adequate comfort level or baseline comfort level  Description  Interventions:  - Encourage patient to monitor pain and request assistance  - Assess pain using appropriate pain scale  - Administer analgesics based on type and severity of pain and evaluate response  - Implement non-pharmacological measures as appropriate and evaluate response  - Consider cultural and social influences on pain and pain management  - Notify physician/advanced practitioner if interventions unsuccessful or patient reports new pain  Outcome: Progressing     Problem: INFECTION - ADULT  Goal: Absence or prevention of progression during hospitalization  Description  INTERVENTIONS:  - Assess and monitor for signs and symptoms of infection  - Monitor lab/diagnostic results  - Monitor all insertion sites, i e  indwelling lines, tubes, and drains  - Monitor endotracheal if appropriate and nasal secretions for changes in amount and color  - [place] appropriate cooling/warming therapies per order  - Administer medications as ordered  - Instruct and encourage patient and family to use good hand hygiene technique  - Identify and instruct in appropriate isolation precautions for identified infection/condition  Outcome: Progressing  Goal: Absence of fever/infection during neutropenic period  Description  INTERVENTIONS:  - Monitor WBC    Outcome: Progressing     Problem: SAFETY ADULT  Goal: Patient will remain free of falls  Description  INTERVENTIONS:  - Assess patient frequently for physical needs  -  Identify cognitive and physical deficits and behaviors that affect risk of falls    -  [place] fall precautions as indicated by assessment   - Educate patient/family on patient safety including physical limitations  - Instruct patient to call for assistance with activity based on assessment  - Modify environment to reduce risk of injury  - Consider OT/PT consult to assist with strengthening/mobility  Outcome: Progressing  Goal: Maintain or return to baseline ADL function  Description  INTERVENTIONS:  -  Assess patient's ability to carry out ADLs; assess patient's baseline for ADL function and identify physical deficits which impact ability to perform ADLs (bathing, care of mouth/teeth, toileting, grooming, dressing, etc )  - Assess/evaluate cause of self-care deficits   - Assess range of motion  - Assess patient's mobility; develop plan if impaired  - Assess patient's need for assistive devices and provide as appropriate  - Encourage maximum independence but intervene and supervise when necessary  - Involve family in performance of ADLs  - Assess for home care needs following discharge   - Consider OT consult to assist with ADL evaluation and planning for discharge  - Provide patient education as appropriate  Outcome: Progressing  Goal: Maintain or return mobility status to optimal level  Description  INTERVENTIONS:  - Assess patient's baseline mobility status (ambulation, transfers, stairs, etc )    - Identify cognitive and physical deficits and behaviors that affect mobility  - Identify mobility aids required to assist with transfers and/or ambulation (gait belt, sit-to-stand, lift, walker, cane, etc )  - [place] fall precautions as indicated by assessment  - Record patient progress and toleration of activity level on Mobility SBAR; progress patient to next Phase/Stage  - Instruct patient to call for assistance with activity based on assessment  - Consider rehabilitation consult to assist with strengthening/weightbearing, etc   Outcome: Progressing     Problem: DISCHARGE PLANNING  Goal: Discharge to home or other facility with appropriate resources  Description  INTERVENTIONS:  - Identify barriers to discharge w/patient and caregiver  - Arrange for needed discharge resources and transportation as appropriate  - Identify discharge learning needs (meds, wound care, etc )  - Arrange for interpretive services to assist at discharge as needed  - Refer to Case Management Department for coordinating discharge planning if the patient needs post-hospital services based on physician/advanced practitioner order or complex needs related to functional status, cognitive ability, or social support system  Outcome: Progressing     Problem: Knowledge Deficit  Goal: Patient/family/caregiver demonstrates understanding of disease process, treatment plan, medications, and discharge instructions  Description  Complete learning assessment and assess knowledge base    Interventions:  - Provide teaching at level of understanding  - Provide teaching via preferred learning methods  Outcome: Progressing

## 2019-09-27 NOTE — ASSESSMENT & PLAN NOTE
BUN 34, creatinine 2 23 on admission, GFR 32  Baseline: BUN is 12-18, creatinine 1-1 21, GFR 67-90  Urinalysis positive for hyaline casts  Most likely due to dehydration  Patient admits to decreased oral hydration and decreased urinary output, and watery stools  - Patient received 1 bolus of NS 0 9% in the ER  - Will receive 1 more bolus of NS 0 9%  - Monitor urine output  - Repeat BMP in the morning  - Hold charted medications to avoid nephrotoxin effects from psychiatric medications  - If patient still has decreased urinary output or no improvement in GFR, BUN, creatinine post rehydration then consider post-renal causes for JARED

## 2019-09-27 NOTE — UTILIZATION REVIEW
Initial Clinical Review    Admission: Date/Time/Statement: 9/26/2019  1854 OBSERVATION   Orders Placed This Encounter   Procedures    Place in Observation     Standing Status:   Standing     Number of Occurrences:   1     Order Specific Question:   Admitting Physician     Answer:   Osmany Bonilla     Order Specific Question:   Level of Care     Answer:   Med Surg [16]     ED Arrival Information     Expected Arrival Acuity Means of Arrival Escorted By Service Admission Type    - 9/26/2019 17:01 Less Urgent Walk-In Self General Medicine Urgent    Arrival Complaint    Hand Pain         Chief Complaint   Patient presents with    Hand Pain     Patient reports that for 1 hour he wasn't able to close his fist and felt numbness in both hands  Now resolved  Assessment/Plan: This is a 64year old male who is homeless  to ED admitted to observation due to JARED/Dehdration  Presented post near  syncopal event - he was walking along the street when  lightheaded with shaking, pain and stiffness in the hands  , he went to [employer] and provided water and pizza  He admits to poor oral hydration and decreased urinary output and watery stools  On exam is dirty and unkempt, steady gait  + orthostatics:  BP:  124/67 HR 71 lying  101/69 HR 73 standing  Bun 34  Creatinine 2 23 with baseline of 1 21  eGFR 32 and was 67 in 8/2019  IVF bolus given  Psychiatric medications on hold  Follow BMP        ED Triage Vitals   Temperature Pulse Respirations Blood Pressure SpO2   09/26/19 1728 09/26/19 1728 09/26/19 1728 09/26/19 1728 09/26/19 1728   98 6 °F (37 °C) 74 18 93/58 97 %      Temp Source Heart Rate Source Patient Position - Orthostatic VS BP Location FiO2 (%)   09/26/19 1728 09/26/19 1728 09/26/19 1728 09/26/19 1728 --   Tympanic Monitor Lying Left arm       Pain Score       09/26/19 2101       No Pain        Wt Readings from Last 1 Encounters:   09/27/19 80 4 kg (177 lb 4 oz)     Additional Vital Signs: 09/27/19 0806  97 2 °F (36 2 °C)Abnormal   63  18  114/78  96 %  None (Room air)  Lying   09/27/19 0006  99 2 °F (37 3 °C)  74  18  109/68  97 %  None (Room air)  Lying   09/26/19 2104  --  71  --  124/67  --  --  Lying - Orthostatic VS   09/26/19 2103  --  73  --  101/69  --  --  Standing - Orthostatic VS   09/26/19 2055  99 1 °F (37 3 °C)  74  18  116/71  99 %  None (Room air)  Sitting - Orthostatic VS       Pertinent Labs/Diagnostic Test Results:   9/26/2019 CxR- No acute cardiopulmonary disease  Results from last 7 days   Lab Units 09/26/19  1754   WBC Thousand/uL 8 30   HEMOGLOBIN g/dL 14 2   HEMATOCRIT % 40 9*   PLATELETS Thousands/uL 180   NEUTROS ABS Thousands/µL 7 10     Results from last 7 days   Lab Units 09/27/19  0525 09/26/19  1754   SODIUM mmol/L 135* 135*   POTASSIUM mmol/L 3 8 4 5   CHLORIDE mmol/L 100 94*   CO2 mmol/L 28 26   ANION GAP mmol/L 7 15*   BUN mg/dL 26* 34*   CREATININE mg/dL 1 37 2 23*   EGFR ml/min/1 73sq m 57* 32*   CALCIUM mg/dL 9 4 10 1     Results from last 7 days   Lab Units 09/26/19  1754   AST U/L 30   ALT U/L 29   ALK PHOS U/L 59   TOTAL PROTEIN g/dL 8 2   ALBUMIN g/dL 5 0   TOTAL BILIRUBIN mg/dL 0 90     Results from last 7 days   Lab Units 09/27/19  0525 09/26/19  1754   GLUCOSE RANDOM mg/dL 111* 70     Results from last 7 days   Lab Units 09/26/19  1754   TROPONIN I ng/mL <0 01     Results from last 7 days   Lab Units 09/26/19  1754   TSH 3RD GENERATON uIU/mL 2 800     Results from last 7 days   Lab Units 09/26/19 2021   CLARITY UA  Clear   COLOR UA  Yellow   SPEC GRAV UA  1 015   PH UA  5 0   GLUCOSE UA mg/dl Negative   KETONES UA mg/dl Negative   BLOOD UA  25 0*   PROTEIN UA mg/dl 15 (Trace)*   NITRITE UA  Negative   BILIRUBIN UA  Negative   UROBILINOGEN UA mg/dL Negative   LEUKOCYTES UA  Negative   WBC UA /hpf None Seen   RBC UA /hpf 1-2*   BACTERIA UA /hpf Occasional   EPITHELIAL CELLS WET PREP /hpf Occasional   MUCUS THREADS  Moderate*     ED Treatment:   Medication Administration from 09/26/2019 1701 to 09/26/2019 2047       Date/Time Order Dose Route Action Comments     09/26/2019 1800 sodium chloride 0 9 % bolus 1,000 mL 1,000 mL Intravenous New Bag         Past Medical History:   Diagnosis Date    Arthritis     Chronic pain     CVA [facility]cerebral vascular accident) ([facility] ) 2011    Hyperlipidemia     Malignant neoplasm of lung ([facility] ) 1985    Last Assessed:  4/7/15    Myocardial infarction ([facility] ) 2011    Last Assessed:  9/22/16    Psychiatric disorder      Present on Admission:  **None**      Admitting Diagnosis: Dehydration [E86 0]  Hand pain [M79 643]  Acute kidney injury ([facility] ) [N17 9]  Age/Sex: 64 y o  male  Admission Orders: 9/26/2019  1854 OBSERVATION   Current Facility-Administered Medications:  influenza vaccine 0 5 mL Intramuscular Once     sodium chloride 0 9 % bolus 1,000 mL   Dose: 1,000 mL  Freq: Once Route: IV  Last Dose: Stopped (09/27/19 0110)  Start: 09/27/19 0015 End: 09/27/19 Huy Carlos Utilization Review Department  Phone: [phone]; Fax [phone]  [email or web address]  ATTENTION: Please call with any questions or concerns to [phone]  and carefully listen to the prompts so that you are directed to the right person  Send all requests for admission clinical reviews, approved or denied determinations and any other requests to fax [phone]   All voicemails are confidential

## 2019-09-30 LAB — GLUCOSE SERPL-MCNC: 94 MG/DL (ref 65–140)

## 2020-03-24 ENCOUNTER — APPOINTMENT (EMERGENCY)
Dept: CT IMAGING | Facility: HOSPITAL | Age: 60
End: 2020-03-24

## 2020-03-24 ENCOUNTER — HOSPITAL ENCOUNTER (EMERGENCY)
Facility: HOSPITAL | Age: 60
Discharge: HOME | End: 2020-03-24
Attending: EMERGENCY MEDICINE | Admitting: EMERGENCY MEDICINE

## 2020-03-24 VITALS
RESPIRATION RATE: 18 BRPM | WEIGHT: 155.2 LBS | BODY MASS INDEX: 25.05 KG/M2 | HEART RATE: 74 BPM | TEMPERATURE: 98.7 F | SYSTOLIC BLOOD PRESSURE: 138 MMHG | OXYGEN SATURATION: 100 % | DIASTOLIC BLOOD PRESSURE: 67 MMHG

## 2020-03-24 DIAGNOSIS — S09.90XA CLOSED HEAD INJURY, INITIAL ENCOUNTER: ICD-10-CM

## 2020-03-24 DIAGNOSIS — S01.01XA LACERATION OF SCALP, INITIAL ENCOUNTER: Primary | ICD-10-CM

## 2020-03-24 PROCEDURE — 12015 RPR F/E/E/N/L/M 7.6-12.5 CM: CPT | Performed by: PHYSICIAN ASSISTANT

## 2020-03-24 PROCEDURE — 70450 CT HEAD/BRAIN W/O DYE: CPT

## 2020-03-24 PROCEDURE — 99283 EMERGENCY DEPT VISIT LOW MDM: CPT

## 2020-03-24 PROCEDURE — 99284 EMERGENCY DEPT VISIT MOD MDM: CPT | Performed by: PHYSICIAN ASSISTANT

## 2020-03-24 RX ORDER — CEPHALEXIN 500 MG/1
500 CAPSULE ORAL 4 TIMES DAILY
Qty: 40 CAPSULE | Refills: 0 | Status: SHIPPED | OUTPATIENT
Start: 2020-03-24 | End: 2020-04-03

## 2020-03-24 RX ORDER — ACETAMINOPHEN 325 MG/1
650 TABLET ORAL ONCE
Status: COMPLETED | OUTPATIENT
Start: 2020-03-24 | End: 2020-03-24

## 2020-03-24 RX ORDER — BACITRACIN, NEOMYCIN, POLYMYXIN B 400; 3.5; 5 [USP'U]/G; MG/G; [USP'U]/G
1 OINTMENT TOPICAL ONCE
Status: COMPLETED | OUTPATIENT
Start: 2020-03-24 | End: 2020-03-24

## 2020-03-24 RX ORDER — LIDOCAINE HYDROCHLORIDE AND EPINEPHRINE 10; 10 MG/ML; UG/ML
5 INJECTION, SOLUTION INFILTRATION; PERINEURAL ONCE
Status: COMPLETED | OUTPATIENT
Start: 2020-03-24 | End: 2020-03-24

## 2020-03-24 RX ORDER — BACITRACIN, NEOMYCIN, POLYMYXIN B 400; 3.5; 5 [USP'U]/G; MG/G; [USP'U]/G
1 OINTMENT TOPICAL ONCE
Status: DISCONTINUED | OUTPATIENT
Start: 2020-03-24 | End: 2020-03-24

## 2020-03-24 RX ADMIN — ACETAMINOPHEN 650 MG: 325 TABLET ORAL at 10:11

## 2020-03-24 RX ADMIN — LIDOCAINE HYDROCHLORIDE,EPINEPHRINE BITARTRATE 5 ML: 10; .01 INJECTION, SOLUTION INFILTRATION; PERINEURAL at 08:50

## 2020-03-24 RX ADMIN — BACITRACIN ZINC NEOMYCIN SULFATE POLYMYXIN B SULFATE 1 LARGE APPLICATION: 400; 3.5; 5 OINTMENT TOPICAL at 10:12

## 2020-03-24 NOTE — DISCHARGE INSTRUCTIONS
Keep clean and dry for 24-48 hours  After water may run over and pat dry- don't submerge in water  Follow with your doctor or return to the ED in 7-10 days for suture removal   Neosporin to area  Watch for sign of infection - redness, swelling, drainage - if you see these signs return sooner  Tylenol as needed for pain  No sports or gym until you are cleared by your doctor/sports medicine  FU with your doctor in 1-3 days  Watch for sign of worsening head injury: vomiting, severe headache, somnolence, confusion, dizziness, if you see these signs return to the ED or return to your doctor sooner

## 2020-03-24 NOTE — ED PROVIDER NOTES
History  Chief Complaint   Patient presents with    Head Laceration     Pt arrives with EMS - reports tripping and falling while taking the trash out  Laceration to forehead  Denies LOC, dizziness, thinners  Patient was going out of the house to take out the trash and he tripped on the last step and fell forward onto the sidewalk  He has a laceration to his forehead  He did not black out  Patient is complaining of a headache  No vomiting dizziness or blurry vision  Patient's last tetanus shot was a year ago  Patient has abrasions on his palms and the right knee  Patient was able to ambulate after fell  No dizziness, no CP or SOB or URI symptoms  No Abdominal pian neck pain or back pain  Prior to Admission Medications   Prescriptions Last Dose Informant Patient Reported? Taking?    FLUoxetine (PROZAC) 20 mg capsule   No Yes   Sig: Take 1 capsule (20 mg total) by mouth daily   Levothyroxine Sodium (TIROSINT) 50 MCG CAPS   Yes Yes   Sig: every 24 hours   aspirin (ECOTRIN LOW STRENGTH) 81 mg EC tablet   Yes Yes   Sig: Take 1 tablet by mouth daily   atorvastatin (LIPITOR) 10 mg tablet   Yes Yes   Sig: Take 1 tablet by mouth daily   baclofen 10 mg tablet   Yes Yes   Sig: Take by mouth   benztropine (COGENTIN) 0 5 mg tablet   Yes Yes   Sig: Take 0 5 mg by mouth 2 (two) times a day   cyclobenzaprine (FLEXERIL) 10 mg tablet   No Yes   Sig: Take 1 tablet (10 mg total) by mouth 3 (three) times a day as needed for muscle spasms   diphenhydrAMINE (BENADRYL) 25 mg capsule   Yes Yes   Sig: take 1 Capsule (25MG)  by oral mouth bid   gabapentin (NEURONTIN) 300 mg capsule   No Yes   Sig: Take 1 capsule (300 mg total) by mouth 3 (three) times a day   hydrOXYzine HCL (ATARAX) 50 mg tablet   Yes Yes   Sig: Take 50 mg by mouth every 6 (six) hours   hydrochlorothiazide (HYDRODIURIL) 12 5 mg tablet   Yes Yes   Sig: every 24 hours   lisinopril (ZESTRIL) 10 mg tablet   Yes Yes   Sig: Take 10 mg by mouth daily loratadine (CLARITIN) 10 mg tablet   Yes Yes   Sig: every 24 hours   metFORMIN (GLUCOPHAGE) 500 mg tablet   No Yes   Sig: Take 1 tablet (500 mg total) by mouth 2 (two) times a day with meals   rOPINIRole (REQUIP) 0 25 mg tablet   Yes Yes   Sig: Take 0 25 mg by mouth   risperiDONE (RisperDAL) 3 mg tablet   Yes Yes   Sig: 3 mg daily at bedtime   traZODone (DESYREL) 100 mg tablet   Yes Yes   Sig: TAKE 1 TO 2 TABLET(S) AT BEDTIME BY MOUTH      Facility-Administered Medications: None       Past Medical History:   Diagnosis Date    Arthritis     Chronic pain     [facility]A [facility]cere[facility] ) 2011    Diabetes mellitus ([facility] )     Hyperlipidemia     Hypertension     [facility]    Last Assessed:  4/7/15    Myocardial infarction [facility]) 2011    Last Assessed:  9/22/16    Psychiatric disorder        Past Surgical History:   Procedure Laterality Date    CHOLECYSTECTOMY      ELBOW SURGERY Right     ELBOW SURGERY Right     GALLBLADDER SURGERY         Family History   Problem Relation Age of Onset    Other Father         Cardiac disorder    Diabetes Father     Other Family         Cardiac disorder    Diabetes Paternal Aunt      I have reviewed and agree with the history as documented  E-Cigarette/Vaping    E-Cigarette Use Never User      E-Cigarette/Vaping Substances     Social History     Tobacco Use    Smoking status: Former Smoker     Last attempt to quit: 2010     Years since quitting: 10 2    Smokeless tobacco: Never Used   Substance Use Topics    Alcohol use: Never     Frequency: Never     Comment: Stopped drinking alcohol 2005    Drug use: Never     Comment: Per [employer]:  History of drug use: 2005       Review of Systems   All other systems reviewed and are negative  Physical Exam  Physical Exam   Constitutional: He is oriented to person, place, and time  He appears well-developed and well-nourished  HENT:   Head: Normocephalic         Right Ear: Tympanic membrane, external ear and ear canal normal    Left Ear: Tympanic membrane, external ear and ear canal normal    Mouth/Throat: Oropharynx is clear and moist    6cm laceration to right forehead - just below hair line  Eyes: Conjunctivae and EOM are normal    Neck: Normal range of motion  Neck supple  Cardiovascular: Normal rate, regular rhythm, normal heart sounds and intact distal pulses  Pulmonary/Chest: Effort normal and breath sounds normal    Abdominal: Soft  Bowel sounds are normal    Musculoskeletal: Normal range of motion  The range of motion knee wrists hands  No bony tenderness  Patient does have abrasions to the area  Lymphadenopathy:     He has no cervical adenopathy  Neurological: He is alert and oriented to person, place, and time  He exhibits normal muscle tone  Coordination normal    Skin: Skin is warm  No rash noted  Abrasion to both palms and right knee  No bony tenderness good range of motion  Psychiatric: He has a normal mood and affect  His behavior is normal    Nursing note and vitals reviewed        Vital Signs  ED Triage Vitals [03/24/20 0826]   Temperature Pulse Respirations Blood Pressure SpO2   98 7 °F (37 1 °C) 74 18 (!) 185/96 100 %      Temp Source Heart Rate Source Patient Position - Orthostatic VS BP Location FiO2 (%)   Oral Monitor Sitting Right arm --      Pain Score       Worst Possible Pain           Vitals:    03/24/20 0826 03/24/20 1001   BP: (!) 185/96 138/67   Pulse: 74    Patient Position - Orthostatic VS: Sitting          Visual Acuity      ED Medications  Medications   lidocaine-epinephrine (XYLOCAINE/EPINEPHRINE) 1 %-1:100,000 injection 5 mL (5 mL Infiltration Given by Other 3/24/20 0850)   acetaminophen (TYLENOL) tablet 650 mg (650 mg Oral Given 3/24/20 1011)   neomycin-bacitracin-polymyxin b (NEOSPORIN) ointment 1 large application (1 large application Topical Given 3/24/20 1012)       Diagnostic Studies  Results Reviewed     None                 CT head without contrast   Final Result by Tiara Fontana MD (03/24 0915)      No acute intracranial abnormality  Right frontal scalp laceration  Minimal left frontal scalp swelling  Workstation performed: [id number]                    Procedures  Laceration repair  Date/Time: 3/24/2020 9:49 AM  Performed by: Cynthia Dominguez PA-C  Authorized by: Cynthia Dominguez PA-C   Consent: Verbal consent obtained  Consent given by: patient  Patient identity confirmed: verbally with patient  Body area: head/neck  Location details: forehead  Laceration length: 8 cm  Foreign body present: piece of grass and dirt  Anesthesia: local infiltration    Anesthesia:  Local Anesthetic: lidocaine 1% with epinephrine      Procedure Details:  Irrigation solution: saline  Skin closure: 6-0 nylon (12)  Subcutaneous closure: 5-0 Chromic gut (2 subcutaneous )  Number of sutures: 14  Technique: simple  Approximation: close  Approximation difficulty: simple  Dressing: antibiotic ointment  Patient tolerance: Patient tolerated the procedure well with no immediate complications               ED Course                                 MDM  Number of Diagnoses or Management Options  Closed head injury, initial encounter: new and requires workup  Laceration of scalp, initial encounter: new and requires workup  Diagnosis management comments: Will CT to evaluate for intracranial pathology  Tetanus - last year  Is contaminated wound will give keflex rx  Discussed w pt and gave good rx coupon     Risk of Complications, Morbidity, and/or Mortality  General comments: BP much improved  instrucitons reivewed w pt       Patient Progress  Patient progress: improved        Disposition  Final diagnoses:   Laceration of scalp, initial encounter   Closed head injury, initial encounter     Time reflects when diagnosis was documented in both MDM as applicable and the Disposition within this note     Time User Action Codes Description Comment 3/24/2020  9:56 AM Elva Saul [S01 [id number]] Laceration of scalp, initial encounter     3/24/2020  9:56 AM Elva Saul [[id number]] Closed head injury, initial encounter       ED Disposition     ED Disposition Condition Date/Time Comment    Discharge Stable Tue Mar 24, 2020  9:56 AM Amadeo Schultz discharge to home/self care  Follow-up Information     Follow up With Specialties Details Why Contact Info Additional Information    [employer]    [address]   [address], [address] [address]  [address], [address], [address] [place], [place], [address]    [address] [facility] [address] Urgent [facility]   [address], [place] [address] [facility]  [phone]     Via the [address] [facility] (North[address]South) [address] toward [place]  Take the [facility] [address]  Keep right and follow signs for [address]/ [place]  Merge onto [address] [facility]  In a half mile, take the exit for [address] toward [place]  In 0 7 miles take the [facility] [employer]  Merge onto [facility]  In 500 feet, turn left on [address] and drive 0 3 miles  [address] will be on your left  [address]) [address] toward [place]  Take the [facility] [employer]  Merge onto [facility]  In 500 feet, turn left on [address] and drive 0 3 miles  [address] will be on your left  [address]) [address] to [address] towards [place]  In 0 7 miles take the [facility] [employer]  Merge onto [facility]  In 500 feet, turn left on [address] and drive 0 3 miles  [address] will be on your left            Discharge Medication List as of 3/24/2020  9:56 AM      CONTINUE these medications which have NOT CHANGED    Details   aspirin (ECOTRIN LOW STRENGTH) 81 mg EC tablet Take 1 tablet by mouth daily, Starting Thu 9/15/2016, Historical Med      atorvastatin (LIPITOR) 10 mg tablet Take 1 tablet by mouth daily, Starting Thu 9/15/2016, Historical Med      baclofen 10 mg tablet Take by mouth, Starting Wed 10/15/2014, Historical Med      benztropine (COGENTIN) 0 5 mg tablet Take 0 5 mg by mouth 2 (two) times a day, Starting Wed 8/21/2019, Historical Med      cyclobenzaprine (FLEXERIL) 10 mg tablet Take 1 tablet (10 mg total) by mouth 3 (three) times a day as needed for muscle spasms, Starting Tue 6/25/2019, Normal      diphenhydrAMINE (BENADRYL) 25 mg capsule take 1 Capsule (25MG)  by oral mouth bid, Historical Med      FLUoxetine (PROZAC) 20 mg capsule Take 1 capsule (20 mg total) by mouth daily, Starting Fri 9/27/2019, Normal      gabapentin (NEURONTIN) 300 mg capsule Take 1 capsule (300 mg total) by mouth 3 (three) times a day, Starting Tue 6/25/2019, Normal      hydrochlorothiazide (HYDRODIURIL) 12 5 mg tablet every 24 hours, Starting Wed 2/26/2014, Historical Med      hydrOXYzine HCL (ATARAX) 50 mg tablet Take 50 mg by mouth every 6 (six) hours, Starting Fri 5/27/2016, Historical Med      Levothyroxine Sodium (TIROSINT) 50 MCG CAPS every 24 hours, Starting Wed 2/26/2014, Historical Med      lisinopril (ZESTRIL) 10 mg tablet Take 10 mg by mouth daily, Starting Wed 8/21/2019, Historical Med      loratadine (CLARITIN) 10 mg tablet every 24 hours, Starting Wed 2/26/2014, Historical Med      metFORMIN (GLUCOPHAGE) 500 mg tablet Take 1 tablet (500 mg total) by mouth 2 (two) times a day with meals, Starting Tue 2/26/2019, Normal      risperiDONE (RisperDAL) 3 mg tablet 3 mg daily at bedtime, Starting Wed 8/21/2019, Historical Med      rOPINIRole (REQUIP) 0 25 mg tablet Take 0 25 mg by mouth, Starting Fri 5/27/2016, Historical Med      traZODone (DESYREL) 100 mg tablet TAKE 1 TO 2 TABLET(S) AT BEDTIME BY MOUTH, Historical Med           No discharge procedures on file      PDMP Review     None          ED Provider  Electronically Signed by           Ro Erickson PA-C  03/24/20 9784

## 2020-03-28 ENCOUNTER — APPOINTMENT (EMERGENCY)
Dept: RADIOLOGY | Facility: HOSPITAL | Age: 60
End: 2020-03-28

## 2020-03-28 ENCOUNTER — HOSPITAL ENCOUNTER (EMERGENCY)
Facility: HOSPITAL | Age: 60
Discharge: HOME | End: 2020-03-28
Attending: EMERGENCY MEDICINE | Admitting: EMERGENCY MEDICINE

## 2020-03-28 VITALS
DIASTOLIC BLOOD PRESSURE: 71 MMHG | TEMPERATURE: 99.2 F | BODY MASS INDEX: 27.75 KG/M2 | SYSTOLIC BLOOD PRESSURE: 124 MMHG | HEART RATE: 68 BPM | RESPIRATION RATE: 18 BRPM | OXYGEN SATURATION: 98 % | WEIGHT: 171.96 LBS

## 2020-03-28 DIAGNOSIS — R07.89 ATYPICAL CHEST PAIN: Primary | ICD-10-CM

## 2020-03-28 DIAGNOSIS — R26.9 GAIT ABNORMALITY: ICD-10-CM

## 2020-03-28 DIAGNOSIS — E03.9 HYPOTHYROIDISM: ICD-10-CM

## 2020-03-28 DIAGNOSIS — R41.3 MEMORY DIFFICULTY: ICD-10-CM

## 2020-03-28 DIAGNOSIS — I10 ESSENTIAL HYPERTENSION: ICD-10-CM

## 2020-03-28 DIAGNOSIS — E11.9 TYPE 2 DIABETES MELLITUS WITHOUT COMPLICATION, WITHOUT LONG-TERM CURRENT USE OF INSULIN (HCC): ICD-10-CM

## 2020-03-28 LAB
ALBUMIN SERPL BCP-MCNC: 3.3 G/DL (ref 3.5–5)
ALP SERPL-CCNC: 85 U/L (ref 46–116)
ALT SERPL W P-5'-P-CCNC: 44 U/L (ref 12–78)
ANION GAP SERPL CALCULATED.3IONS-SCNC: 5 MMOL/L (ref 4–13)
APTT PPP: 25 SECONDS (ref 23–37)
AST SERPL W P-5'-P-CCNC: 19 U/L (ref 5–45)
BASOPHILS # BLD AUTO: 0.02 THOUSANDS/ΜL (ref 0–0.1)
BASOPHILS NFR BLD AUTO: 0 % (ref 0–1)
BILIRUB SERPL-MCNC: 0.27 MG/DL (ref 0.2–1)
BUN SERPL-MCNC: 14 MG/DL (ref 5–25)
CALCIUM SERPL-MCNC: 8.6 MG/DL (ref 8.3–10.1)
CHLORIDE SERPL-SCNC: 104 MMOL/L (ref 100–108)
CO2 SERPL-SCNC: 31 MMOL/L (ref 21–32)
CREAT SERPL-MCNC: 1.14 MG/DL (ref 0.6–1.3)
EOSINOPHIL # BLD AUTO: 0.39 THOUSAND/ΜL (ref 0–0.61)
EOSINOPHIL NFR BLD AUTO: 7 % (ref 0–6)
ERYTHROCYTE [DISTWIDTH] IN BLOOD BY AUTOMATED COUNT: 12.9 % (ref 11.6–15.1)
GFR SERPL CREATININE-BSD FRML MDRD: 71 ML/MIN/1.73SQ M
GLUCOSE SERPL-MCNC: 164 MG/DL (ref 65–140)
HCT VFR BLD AUTO: 40.2 % (ref 36.5–49.3)
HGB BLD-MCNC: 13.7 G/DL (ref 12–17)
IMM GRANULOCYTES # BLD AUTO: 0.01 THOUSAND/UL (ref 0–0.2)
IMM GRANULOCYTES NFR BLD AUTO: 0 % (ref 0–2)
INR PPP: 0.98 (ref 0.84–1.19)
LIPASE SERPL-CCNC: 168 U/L (ref 73–393)
LYMPHOCYTES # BLD AUTO: 1.33 THOUSANDS/ΜL (ref 0.6–4.47)
LYMPHOCYTES NFR BLD AUTO: 25 % (ref 14–44)
MCH RBC QN AUTO: 31 PG (ref 26.8–34.3)
MCHC RBC AUTO-ENTMCNC: 34.1 G/DL (ref 31.4–37.4)
MCV RBC AUTO: 91 FL (ref 82–98)
MONOCYTES # BLD AUTO: 0.35 THOUSAND/ΜL (ref 0.17–1.22)
MONOCYTES NFR BLD AUTO: 7 % (ref 4–12)
NEUTROPHILS # BLD AUTO: 3.2 THOUSANDS/ΜL (ref 1.85–7.62)
NEUTS SEG NFR BLD AUTO: 61 % (ref 43–75)
NRBC BLD AUTO-RTO: 0 /100 WBCS
NT-PROBNP SERPL-MCNC: 75 PG/ML
PLATELET # BLD AUTO: 161 THOUSANDS/UL (ref 149–390)
PMV BLD AUTO: 9.7 FL (ref 8.9–12.7)
POTASSIUM SERPL-SCNC: 3.7 MMOL/L (ref 3.5–5.3)
PROT SERPL-MCNC: 6.8 G/DL (ref 6.4–8.2)
PROTHROMBIN TIME: 13.1 SECONDS (ref 11.6–14.5)
RBC # BLD AUTO: 4.42 MILLION/UL (ref 3.88–5.62)
SODIUM SERPL-SCNC: 140 MMOL/L (ref 136–145)
TROPONIN I SERPL-MCNC: <0.02 NG/ML
WBC # BLD AUTO: 5.3 THOUSAND/UL (ref 4.31–10.16)

## 2020-03-28 PROCEDURE — 85025 COMPLETE CBC W/AUTO DIFF WBC: CPT | Performed by: EMERGENCY MEDICINE

## 2020-03-28 PROCEDURE — 99285 EMERGENCY DEPT VISIT HI MDM: CPT

## 2020-03-28 PROCEDURE — 84484 ASSAY OF TROPONIN QUANT: CPT | Performed by: EMERGENCY MEDICINE

## 2020-03-28 PROCEDURE — 71046 X-RAY EXAM CHEST 2 VIEWS: CPT

## 2020-03-28 PROCEDURE — 99285 EMERGENCY DEPT VISIT HI MDM: CPT | Performed by: EMERGENCY MEDICINE

## 2020-03-28 PROCEDURE — 83690 ASSAY OF LIPASE: CPT | Performed by: EMERGENCY MEDICINE

## 2020-03-28 PROCEDURE — 83880 ASSAY OF NATRIURETIC PEPTIDE: CPT | Performed by: EMERGENCY MEDICINE

## 2020-03-28 PROCEDURE — 80053 COMPREHEN METABOLIC PANEL: CPT | Performed by: EMERGENCY MEDICINE

## 2020-03-28 PROCEDURE — 93005 ELECTROCARDIOGRAM TRACING: CPT

## 2020-03-28 PROCEDURE — 36415 COLL VENOUS BLD VENIPUNCTURE: CPT | Performed by: EMERGENCY MEDICINE

## 2020-03-28 PROCEDURE — 85610 PROTHROMBIN TIME: CPT | Performed by: EMERGENCY MEDICINE

## 2020-03-28 PROCEDURE — 85730 THROMBOPLASTIN TIME PARTIAL: CPT | Performed by: EMERGENCY MEDICINE

## 2020-03-28 RX ORDER — FLUOXETINE HYDROCHLORIDE 20 MG/1
20 CAPSULE ORAL DAILY
Qty: 30 CAPSULE | Refills: 0 | Status: SHIPPED | OUTPATIENT
Start: 2020-03-28

## 2020-03-28 RX ORDER — ASPIRIN 81 MG/1
81 TABLET ORAL DAILY
Qty: 30 TABLET | Refills: 0 | Status: SHIPPED | OUTPATIENT
Start: 2020-03-28

## 2020-03-28 RX ORDER — TRAZODONE HYDROCHLORIDE 100 MG/1
100 TABLET ORAL 2 TIMES DAILY
Qty: 60 TABLET | Refills: 0 | Status: SHIPPED | OUTPATIENT
Start: 2020-03-28 | End: 2020-03-29 | Stop reason: SDUPTHER

## 2020-03-28 RX ORDER — BENZTROPINE MESYLATE 0.5 MG/1
0.5 TABLET ORAL 2 TIMES DAILY
Qty: 60 TABLET | Refills: 0 | Status: SHIPPED | OUTPATIENT
Start: 2020-03-28

## 2020-03-28 RX ORDER — ATORVASTATIN CALCIUM 10 MG/1
10 TABLET, FILM COATED ORAL DAILY
Qty: 30 TABLET | Refills: 0 | Status: SHIPPED | OUTPATIENT
Start: 2020-03-28

## 2020-03-28 RX ORDER — LISINOPRIL 10 MG/1
10 TABLET ORAL DAILY
COMMUNITY
Start: 2019-09-19

## 2020-03-28 RX ORDER — LEVOTHYROXINE SODIUM 50 UG/1
50 CAPSULE ORAL EVERY 24 HOURS
Qty: 30 CAPSULE | Refills: 0 | Status: SHIPPED | OUTPATIENT
Start: 2020-03-28

## 2020-03-28 RX ORDER — LISINOPRIL 10 MG/1
10 TABLET ORAL DAILY
Qty: 30 TABLET | Refills: 0 | Status: SHIPPED | OUTPATIENT
Start: 2020-03-28 | End: 2020-04-24 | Stop reason: SDUPTHER

## 2020-03-28 NOTE — ED PROVIDER NOTES
History  Chief Complaint   Patient presents with    Chest Pain     chest pain that started one hour PTA, reports also feeling gas pains  CP started while sitting watching TV       History provided by:  Patient   used: No    Chest Pain   Pain location:  R chest  Pain quality: aching    Pain radiates to:  Does not radiate  Pain radiates to the back: no    Pain severity:  Moderate  Onset quality:  Gradual  Duration:  2 months  Timing:  Constant  Progression:  Waxing and waning  Chronicity:  New  Context: movement and at rest    Relieved by:  Nothing  Worsened by:  Nothing tried  Ineffective treatments:  None tried  Associated symptoms: no abdominal pain, no altered mental status, no back pain, no cough, no dizziness, no dysphagia, no fever, no headache, no lower extremity edema, no nausea, no palpitations, no shortness of breath and not vomiting    Risk factors comment:  Hx of MI many years back, denies Stent insertion      Prior to Admission Medications   Prescriptions Last Dose Informant Patient Reported? Taking?    FLUoxetine (PROZAC) 20 mg capsule 3/27/2020 at Unknown time  No Yes   Sig: Take 1 capsule (20 mg total) by mouth daily   FLUoxetine (PROzac) 20 mg capsule   No No   Sig: Take 1 capsule (20 mg total) by mouth daily   Levothyroxine Sodium (TIROSINT) 50 MCG CAPS Not Taking at Unknown time  Yes No   Sig: every 24 hours   Levothyroxine Sodium (Tirosint) 50 MCG CAPS   No No   Sig: Take 1 capsule (50 mcg total) by mouth every 24 hours   aspirin (ECOTRIN LOW STRENGTH) 81 mg EC tablet 3/27/2020 at Unknown time  Yes Yes   Sig: Take 1 tablet by mouth daily   aspirin (ECOTRIN LOW STRENGTH) 81 mg EC tablet   No No   Sig: Take 1 tablet (81 mg total) by mouth daily   atorvastatin (LIPITOR) 10 mg tablet Not Taking at Unknown time  Yes No   Sig: Take 1 tablet by mouth daily   atorvastatin (LIPITOR) 10 mg tablet   No No   Sig: Take 1 tablet (10 mg total) by mouth daily   baclofen 10 mg tablet Not Taking at Unknown time  Yes No   Sig: Take by mouth   benztropine (COGENTIN) 0 5 mg tablet Not Taking at Unknown time  Yes No   Sig: Take 0 5 mg by mouth 2 (two) times a day   benztropine (COGENTIN) 0 5 mg tablet   No No   Sig: Take 1 tablet (0 5 mg total) by mouth 2 (two) times a day   cephalexin (KEFLEX) 500 mg capsule Not Taking at Unknown time  No No   Sig: Take 1 capsule (500 mg total) by mouth 4 (four) times a day for 10 days   Patient not taking: Reported on 3/28/2020   cyclobenzaprine (FLEXERIL) 10 mg tablet Not Taking at Unknown time  No No   Sig: Take 1 tablet (10 mg total) by mouth 3 (three) times a day as needed for muscle spasms   Patient not taking: Reported on 3/28/2020   diphenhydrAMINE (BENADRYL) 25 mg capsule Not Taking at Unknown time  Yes No   Sig: take 1 Capsule (25MG)  by oral mouth bid   gabapentin (NEURONTIN) 300 mg capsule Not Taking at Unknown time  No No   Sig: Take 1 capsule (300 mg total) by mouth 3 (three) times a day   Patient not taking: Reported on 3/28/2020   hydrOXYzine HCL (ATARAX) 50 mg tablet Not Taking at Unknown time  Yes No   Sig: Take 50 mg by mouth every 6 (six) hours   hydrochlorothiazide (HYDRODIURIL) 12 5 mg tablet Not Taking at Unknown time  Yes No   Sig: every 24 hours   lisinopril (ZESTRIL) 10 mg tablet 3/27/2020 at Unknown time  Yes Yes   Sig: Take 10 mg by mouth daily   lisinopril (ZESTRIL) 10 mg tablet   Yes Yes   Sig: Take 10 mg by mouth daily   lisinopril (ZESTRIL) 10 mg tablet   No No   Sig: Take 1 tablet (10 mg total) by mouth daily   loratadine (CLARITIN) 10 mg tablet   Yes No   Sig: every 24 hours   metFORMIN (GLUCOPHAGE) 500 mg tablet 3/27/2020 at Unknown time  No Yes   Sig: Take 1 tablet (500 mg total) by mouth 2 (two) times a day with meals   metFORMIN (GLUCOPHAGE) 500 mg tablet   No No   Sig: Take 1 tablet (500 mg total) by mouth 2 (two) times a day with meals   rOPINIRole (REQUIP) 0 25 mg tablet Not Taking at Unknown time  Yes No   Sig: Take 0 25 mg by mouth risperiDONE (RisperDAL) 3 mg tablet 3/27/2020 at Unknown time  Yes Yes   Sig: 3 mg daily at bedtime   traZODone (DESYREL) 100 mg tablet 3/27/2020 at Unknown time  Yes Yes   Sig: TAKE 1 TO 2 TABLET(S) AT BEDTIME BY MOUTH   traZODone (DESYREL) 100 mg tablet   No No   Sig: Take 1 tablet (100 mg total) by mouth 2 (two) times a day      Facility-Administered Medications: None       Past Medical History:   Diagnosis Date    Arthritis     Chronic pain     [facility]A [facility]cere[facility]) [facility] [address] ) 2011    [facility]     Hyperlipidemia     Hypertension     Malignant neoplasm of lung ([facility] ) 1985    Last Assessed:  4/7/15    Myocardial infarction [facility]) 2011    Last Assessed:  9/22/16    Psychiatric disorder        Past Surgical History:   Procedure Laterality Date    CHOLECYSTECTOMY      ELBOW SURGERY Right     ELBOW SURGERY Right     GALLBLADDER SURGERY         Family History   Problem Relation Age of Onset    Other Father         Cardiac disorder    Diabetes Father     Other Family         Cardiac disorder    Diabetes Paternal Aunt      I have reviewed and agree with the history as documented  E-Cigarette/Vaping    E-Cigarette Use Never User      E-Cigarette/Vaping Substances     Social History     Tobacco Use    Smoking status: Former Smoker     Last attempt to quit: 2010     Years since quitting: 10 2    Smokeless tobacco: Never Used   Substance Use Topics    Alcohol use: Never     Frequency: Never     Comment: Stopped drinking alcohol 2005    Drug use: Never     Comment: Per [employer]:  History of drug use: 2005       Review of Systems   Constitutional: Negative for activity change, chills and fever  HENT: Negative for facial swelling, sore throat and trouble swallowing  Eyes: Negative for pain and visual disturbance  Respiratory: Negative for cough, chest tightness and shortness of breath  Cardiovascular: Positive for chest pain  Negative for palpitations and leg swelling  Gastrointestinal: Negative for abdominal pain, blood in stool, diarrhea, nausea and vomiting  Genitourinary: Negative for dysuria and flank pain  Musculoskeletal: Negative for back pain, neck pain and neck stiffness  Skin: Negative for pallor and rash  Allergic/Immunologic: Negative for environmental allergies and immunocompromised state  Neurological: Negative for dizziness and headaches  Hematological: Negative for adenopathy  Does not bruise/bleed easily  Psychiatric/Behavioral: Negative for agitation and behavioral problems  All other systems reviewed and are negative  Physical Exam  Physical Exam   Constitutional: He is oriented to person, place, and time  He appears well-developed and well-nourished  No distress  HENT:   Head: Normocephalic and atraumatic  Eyes: EOM are normal    Neck: Normal range of motion  Neck supple  Cardiovascular: Normal rate, regular rhythm, normal heart sounds and intact distal pulses  Pulmonary/Chest: Effort normal and breath sounds normal    Abdominal: Soft  Bowel sounds are normal  There is no tenderness  There is no rebound and no guarding  Musculoskeletal: Normal range of motion  Neurological: He is alert and oriented to person, place, and time  Skin: Skin is warm and dry  Psychiatric: He has a normal mood and affect  Nursing note and vitals reviewed        Vital Signs  ED Triage Vitals   Temperature Pulse Respirations Blood Pressure SpO2   03/28/20 1747 03/28/20 1747 03/28/20 1747 03/28/20 1747 03/28/20 1747   99 2 °F (37 3 °C) 70 18 140/75 99 %      Temp Source Heart Rate Source Patient Position - Orthostatic VS BP Location FiO2 (%)   03/28/20 1747 03/28/20 1747 03/28/20 1747 03/28/20 1747 --   Oral Monitor Sitting Right arm       Pain Score       03/28/20 1916       6           Vitals:    03/28/20 1747 03/28/20 1916   BP: 140/75 124/71   Pulse: 70 68   Patient Position - Orthostatic VS: Sitting Lying         Visual Acuity      ED Medications  Medications - No data to display    Diagnostic Studies  Results Reviewed     Procedure Component Value Units Date/Time    Troponin I [[id number]]  (Normal) Collected:  03/28/20 1823    Lab Status:  Final result Specimen:  Blood from Arm, Left Updated:  03/28/20 1904     Troponin I <0 02 ng/mL     Lipase [[id number]]  (Normal) Collected:  03/28/20 1823    Lab Status:  Final result Specimen:  Blood from Arm, Left Updated:  03/28/20 1855     Lipase 168 u/L     NT-BNP PRO [[id number]]  (Normal) Collected:  03/28/20 1823    Lab Status:  Final result Specimen:  Blood from Arm, Left Updated:  03/28/20 1855     NT-proBNP 75 pg/mL     Comprehensive metabolic panel [[id number]]  (Abnormal) Collected:  03/28/20 1823    Lab Status:  Final result Specimen:  Blood from Arm, Left Updated:  03/28/20 1849     Sodium 140 mmol/L      Potassium 3 7 mmol/L      Chloride 104 mmol/L      CO2 31 mmol/L      ANION GAP 5 mmol/L      BUN 14 mg/dL      Creatinine 1 14 mg/dL      Glucose 164 mg/dL      Calcium 8 6 mg/dL      AST 19 U/L      ALT 44 U/L      Alkaline Phosphatase 85 U/L      Total Protein 6 8 g/dL      Albumin 3 3 g/dL      Total Bilirubin 0 27 mg/dL      eGFR 71 ml/min/1 73sq m     Narrative:       [place] guidelines for Chronic Kidney Disease (CKD):     Stage 1 with normal or high GFR (GFR > 90 mL/min/1 73 square meters)    Stage 2 Mild CKD (GFR = 60-89 mL/min/1 73 square meters)    Stage 3A Moderate CKD (GFR = 45-59 mL/min/1 73 square meters)    Stage 3B Moderate CKD (GFR = 30-44 mL/min/1 73 square meters)    Stage 4 Severe CKD (GFR = 15-29 mL/min/1 73 square meters)    Stage 5 End Stage CKD (GFR <15 mL/min/1 73 square meters)  Note: GFR calculation is accurate only with a steady state creatinine    Protime-INR [[id number]]  (Normal) Collected:  03/28/20 1823    Lab Status:  Final result Specimen:  Blood from Arm, Left Updated:  03/28/20 1843     Protime 13 1 seconds      INR 0 98 APTT [[id number]]  (Normal) Collected:  03/28/20 1823    Lab Status:  Final result Specimen:  Blood from Arm, Left Updated:  03/28/20 1843     PTT 25 seconds     CBC and differential [[id number]]  (Abnormal) Collected:  03/28/20 1823    Lab Status:  Final result Specimen:  Blood from Arm, Left Updated:  03/28/20 1828     WBC 5 30 Thousand/uL      RBC 4 42 Million/uL      Hemoglobin 13 7 g/dL      Hematocrit 40 2 %      MCV 91 fL      MCH 31 0 pg      MCHC 34 1 g/dL      RDW 12 9 %      MPV 9 7 fL      Platelets 869 Thousands/uL      nRBC 0 /100 WBCs      Neutrophils Relative 61 %      Immat GRANS % 0 %      Lymphocytes Relative 25 %      Monocytes Relative 7 %      Eosinophils Relative 7 %      Basophils Relative 0 %      Neutrophils Absolute 3 20 Thousands/µL      Immature Grans Absolute 0 01 Thousand/uL      Lymphocytes Absolute 1 33 Thousands/µL      Monocytes Absolute 0 35 Thousand/µL      Eosinophils Absolute 0 39 Thousand/µL      Basophils Absolute 0 02 Thousands/µL                  XR chest 2 views   Final Result by Patrick Chacko MD (03/28 1938)      No acute cardiopulmonary disease  Workstation performed: [id number]                    Procedures  ECG 12 Lead Documentation Only  Date/Time: 3/28/2020 6:57 PM  Performed by: Celsa Jackson MD  Authorized by: Celsa Jackson MD     Indications / Diagnosis:  Chest pain  ECG reviewed by me, the ED Provider: yes    Patient location:  ED  Interpretation:     Interpretation: normal    Rate:     ECG rate assessment: normal    Rhythm:     Rhythm: sinus rhythm    Ectopy:     Ectopy: none    QRS:     QRS axis:  Normal  Conduction:     Conduction: normal    ST segments:     ST segments:  Normal  T waves:     T waves: normal               ED Course  ED Course as of Mar 28 2059   Sat Mar 28, 2020   1906 WBC: 5 30   1906 Hemoglobin: 13 7   1906 Platelet Count: 638   1906 Sodium: 140   1906 Potassium: 3 7   1906 BUN: 14   1906 Creatinine: 1 14   1907 Labs wnl  Troponin I: <0 02   1910 CXR, EKG reviewed, no acute abnormality  1943 Patient requested  med refill as he was in long term, meds ordered for online  Advised strictly to follow up with PCP  HEART Risk Score      Most Recent Value   Heart Score Risk Calculator   History  0 Filed at: 03/28/2020 1905   ECG  0 Filed at: 03/28/2020 1905   Age  1 Filed at: 03/28/2020 1905   Risk Factors  2 Filed at: 03/28/2020 1905   Troponin  0 Filed at: 03/28/2020 1905   HEART Score  3 Filed at: 03/28/2020 1905                              [facility]  Number of Diagnoses or Management Options  Atypical chest pain: new and requires workup  Essential hypertension:   Gait abnormality:   Hypothyroidism:   Diagnosis management comments: D/D: Atyical right sided chest pain, for about 2 months, stable vitals and exam    Labs, CXR, EKG wnl  We will discharge with outpatient follow up         Amount and/or Complexity of Data Reviewed  Clinical lab tests: reviewed and ordered  Tests in the radiology section of CPT®: ordered and reviewed  Tests in the medicine section of CPT®: ordered and reviewed  Independent visualization of images, tracings, or specimens: yes          Disposition  Final diagnoses:   Atypical chest pain   Hypothyroidism - History of   Gait abnormality - History of   Essential hypertension - History of     Time reflects when diagnosis was documented in both MDM as applicable and the Disposition within this note     Time User Action Codes Description Comment    3/28/2020  7:05 PM Adolfo Martinez [R07 89] Atypical chest pain     3/28/2020  7:38 PM Adolfo Martinez [R41 3] Memory difficulty     3/28/2020  7:39 PM Alam, [address] [R41 3] Memory difficulty     3/28/2020  7:40 PM Alam, [address] [R41 3] Memory difficulty     3/28/2020  7:40 PM Adolfo Martinez [E11 9] Type 2 diabetes mellitus without complication, without long-term current use of insulin ([facility]     3/28/2020  7:40 PM Alam, [address] [R41 3] Memory difficulty     3/28/2020  7:40 PM Antoniette Keiry Modify [E11 9] Type 2 diabetes mellitus without complication, without long-term current use of insulin ([address] )     3/28/2020  7:43 PM Alam, [address] [R41 3] Memory difficulty     3/28/2020  7:43 PM Alam, [address] [E11 9] Type 2 diabetes mellitus without complication, without long-term current use of insulin ([address] )     3/28/2020  7:43 PM Antoniette Keiry Add [E03 9] Hypothyroidism     3/28/2020  7:43 PM Antoniette Keiry Add [R26 9] Gait abnormality     3/28/2020  7:43 PM Alam, [address] Essential hypertension     3/28/2020  7:43 PM Alam, [address] [R41 3] Memory difficulty     3/28/2020  7:43 PM Alam, [address] [E11 9] Type 2 diabetes mellitus without complication, without long-term current use of insulin ([address] )     3/28/2020  8:58 PM Alam, [address] [R26 9] Gait abnormality History of    3/28/2020  8:58 PM Alam, [address] Essential hypertension History of    3/28/2020  8:58 PM Alam, [address] [E03 9] Hypothyroidism History of      ED Disposition     ED Disposition Condition Date/Time Comment    Discharge Stable Sat Mar 28, 2020  7:05 PM Amadeo Schultz discharge to home/self care              Follow-up Information     Follow up With Specialties Details Why Contact Info Additional [address] Family Medicine Schedule an appointment as soon as possible for a visit   [address], [address] [id number]  [address], [address], [address] [place], [place], [address] [facility] [address]          Discharge Medication List as of 3/28/2020  7:18 PM      CONTINUE these medications which have NOT CHANGED    Details   risperiDONE (RisperDAL) 3 mg tablet 3 mg daily at bedtime, Starting Wed 8/21/2019, Historical Med      aspirin (ECOTRIN LOW STRENGTH) 81 mg EC tablet Take 1 tablet by mouth daily, Starting Thu 9/15/2016, Historical Med      FLUoxetine (PROZAC) 20 mg capsule Take 1 capsule (20 mg total) by mouth daily, Starting Fri 9/27/2019, Normal      lisinopril (ZESTRIL) 10 mg tablet Take 10 mg by mouth daily, Starting Wed 8/21/2019, Historical Med      metFORMIN (GLUCOPHAGE) 500 mg tablet Take 1 tablet (500 mg total) by mouth 2 (two) times a day with meals, Starting Tue 2/26/2019, Normal      traZODone (DESYREL) 100 mg tablet TAKE 1 TO 2 TABLET(S) AT BEDTIME BY MOUTH, Historical Med      baclofen 10 mg tablet Take by mouth, Starting Wed 10/15/2014, Historical Med      cephalexin (KEFLEX) 500 mg capsule Take 1 capsule (500 mg total) by mouth 4 (four) times a day for 10 days, Starting Tue 3/24/2020, Until Fri 4/3/2020, Print      cyclobenzaprine (FLEXERIL) 10 mg tablet Take 1 tablet (10 mg total) by mouth 3 (three) times a day as needed for muscle spasms, Starting Tue 6/25/2019, Normal      diphenhydrAMINE (BENADRYL) 25 mg capsule take 1 Capsule (25MG)  by oral mouth bid, Historical Med      gabapentin (NEURONTIN) 300 mg capsule Take 1 capsule (300 mg total) by mouth 3 (three) times a day, Starting Tue 6/25/2019, Normal      hydrochlorothiazide (HYDRODIURIL) 12 5 mg tablet every 24 hours, Starting Wed 2/26/2014, Historical Med      hydrOXYzine HCL (ATARAX) 50 mg tablet Take 50 mg by mouth every 6 (six) hours, Starting Fri 5/27/2016, Historical Med      loratadine (CLARITIN) 10 mg tablet every 24 hours, Starting Wed 2/26/2014, Historical Med      rOPINIRole (REQUIP) 0 25 mg tablet Take 0 25 mg by mouth, Starting Fri 5/27/2016, Historical Med      atorvastatin (LIPITOR) 10 mg tablet Take 1 tablet by mouth daily, Starting Thu 9/15/2016, Historical Med      benztropine (COGENTIN) 0 5 mg tablet Take 0 5 mg by mouth 2 (two) times a day, Starting Wed 8/21/2019, Historical Med      Levothyroxine Sodium (TIROSINT) 50 MCG CAPS every 24 hours, Starting Wed 2/26/2014, Historical Med           No discharge procedures on file      PDMP Review None          ED Provider  Electronically Signed by           Willa Guillory MD  03/28/20 2054

## 2020-03-29 LAB
ATRIAL RATE: 69 BPM
P AXIS: 44 DEGREES
PR INTERVAL: 140 MS
QRS AXIS: 65 DEGREES
QRSD INTERVAL: 80 MS
QT INTERVAL: 406 MS
QTC INTERVAL: 435 MS
T WAVE AXIS: 43 DEGREES
VENTRICULAR RATE: 69 BPM

## 2020-03-29 PROCEDURE — 93010 ELECTROCARDIOGRAM REPORT: CPT | Performed by: INTERNAL MEDICINE

## 2020-03-29 RX ORDER — TRAZODONE HYDROCHLORIDE 100 MG/1
100 TABLET ORAL 2 TIMES DAILY
Qty: 60 TABLET | Refills: 0 | Status: SHIPPED | OUTPATIENT
Start: 2020-03-29

## 2020-03-29 RX ORDER — TRAZODONE HYDROCHLORIDE 100 MG/1
100 TABLET ORAL 2 TIMES DAILY
Qty: 60 TABLET | Refills: 0 | Status: SHIPPED | OUTPATIENT
Start: 2020-03-29 | End: 2020-03-29 | Stop reason: SDUPTHER

## 2020-03-29 NOTE — ED RE-EVALUATION NOTE
Patient called stating [address] [place] [address] was not open today and he needed his medications he was prescribed by Dr Pete Vo  Patient would like all prescriptions sent to [facility]  I called [address] [place] [address] to discontinue all medications  I left a voicemail on their machine  I spoke with pharmacist at [address] [place] [address] and sent all prescriptions that Dr Pete Vo refilled to them       Erasmo Rodriguez PA-C  03/29/20 1022

## 2020-04-24 ENCOUNTER — HOSPITAL ENCOUNTER (EMERGENCY)
Facility: HOSPITAL | Age: 60
Discharge: HOME | End: 2020-04-24
Attending: EMERGENCY MEDICINE | Admitting: EMERGENCY MEDICINE
Payer: COMMERCIAL

## 2020-04-24 VITALS
OXYGEN SATURATION: 98 % | WEIGHT: 180.56 LBS | SYSTOLIC BLOOD PRESSURE: 167 MMHG | RESPIRATION RATE: 18 BRPM | TEMPERATURE: 99.2 F | HEART RATE: 53 BPM | DIASTOLIC BLOOD PRESSURE: 89 MMHG | BODY MASS INDEX: 29.14 KG/M2

## 2020-04-24 DIAGNOSIS — K02.9 PAIN DUE TO DENTAL CARIES: Primary | ICD-10-CM

## 2020-04-24 DIAGNOSIS — I10 ESSENTIAL HYPERTENSION: ICD-10-CM

## 2020-04-24 DIAGNOSIS — I10 HTN (HYPERTENSION): ICD-10-CM

## 2020-04-24 DIAGNOSIS — K08.89 TOOTHACHE: ICD-10-CM

## 2020-04-24 PROCEDURE — 96372 THER/PROPH/DIAG INJ SC/IM: CPT

## 2020-04-24 PROCEDURE — 99282 EMERGENCY DEPT VISIT SF MDM: CPT

## 2020-04-24 PROCEDURE — 99284 EMERGENCY DEPT VISIT MOD MDM: CPT | Performed by: PHYSICIAN ASSISTANT

## 2020-04-24 RX ORDER — LIDOCAINE HYDROCHLORIDE 20 MG/ML
SOLUTION OROPHARYNGEAL
Qty: 200 ML | Refills: 0 | Status: SHIPPED | OUTPATIENT
Start: 2020-04-24

## 2020-04-24 RX ORDER — IBUPROFEN 600 MG/1
600 TABLET ORAL EVERY 6 HOURS PRN
Qty: 30 TABLET | Refills: 0 | Status: SHIPPED | OUTPATIENT
Start: 2020-04-24 | End: 2020-05-04

## 2020-04-24 RX ORDER — KETOROLAC TROMETHAMINE 30 MG/ML
15 INJECTION, SOLUTION INTRAMUSCULAR; INTRAVENOUS ONCE
Status: COMPLETED | OUTPATIENT
Start: 2020-04-24 | End: 2020-04-24

## 2020-04-24 RX ORDER — LIDOCAINE HYDROCHLORIDE 20 MG/ML
15 SOLUTION OROPHARYNGEAL ONCE
Status: COMPLETED | OUTPATIENT
Start: 2020-04-24 | End: 2020-04-24

## 2020-04-24 RX ORDER — LISINOPRIL 10 MG/1
10 TABLET ORAL DAILY
Qty: 14 TABLET | Refills: 0 | Status: SHIPPED | OUTPATIENT
Start: 2020-04-24

## 2020-04-24 RX ORDER — LISINOPRIL 5 MG/1
10 TABLET ORAL ONCE
Status: COMPLETED | OUTPATIENT
Start: 2020-04-24 | End: 2020-04-24

## 2020-04-24 RX ORDER — CLINDAMYCIN HYDROCHLORIDE 300 MG/1
300 CAPSULE ORAL 4 TIMES DAILY
Qty: 40 CAPSULE | Refills: 0 | Status: SHIPPED | OUTPATIENT
Start: 2020-04-24 | End: 2020-05-04

## 2020-04-24 RX ADMIN — LIDOCAINE HYDROCHLORIDE 15 ML: 20 SOLUTION ORAL; TOPICAL at 16:11

## 2020-04-24 RX ADMIN — LISINOPRIL 10 MG: 5 TABLET ORAL at 16:11

## 2020-04-24 RX ADMIN — KETOROLAC TROMETHAMINE 15 MG: 30 INJECTION, SOLUTION INTRAMUSCULAR at 16:09

## 2020-06-09 ENCOUNTER — TELEPHONE (OUTPATIENT)
Dept: FAMILY MEDICINE CLINIC | Facility: CLINIC | Age: 60
End: 2020-06-09

## 2022-08-11 ENCOUNTER — APPOINTMENT (EMERGENCY)
Dept: RADIOLOGY | Facility: HOSPITAL | Age: 62
End: 2022-08-11
Payer: COMMERCIAL

## 2022-08-11 ENCOUNTER — HOSPITAL ENCOUNTER (EMERGENCY)
Facility: HOSPITAL | Age: 62
Discharge: HOME | End: 2022-08-11
Attending: EMERGENCY MEDICINE | Admitting: EMERGENCY MEDICINE
Payer: COMMERCIAL

## 2022-08-11 VITALS
WEIGHT: 174.38 LBS | SYSTOLIC BLOOD PRESSURE: 155 MMHG | BODY MASS INDEX: 28.15 KG/M2 | DIASTOLIC BLOOD PRESSURE: 78 MMHG | HEART RATE: 57 BPM | TEMPERATURE: 98.7 F | RESPIRATION RATE: 18 BRPM | OXYGEN SATURATION: 98 %

## 2022-08-11 DIAGNOSIS — R07.9 CHEST PAIN: Primary | ICD-10-CM

## 2022-08-11 DIAGNOSIS — F41.9 ANXIETY: ICD-10-CM

## 2022-08-11 LAB
2HR DELTA HS TROPONIN: 0 NG/L
ALBUMIN SERPL BCP-MCNC: 4.2 G/DL (ref 3.5–5)
ALP SERPL-CCNC: 102 U/L (ref 46–116)
ALT SERPL W P-5'-P-CCNC: 49 U/L (ref 12–78)
ANION GAP SERPL CALCULATED.3IONS-SCNC: 10 MMOL/L (ref 4–13)
AST SERPL W P-5'-P-CCNC: 36 U/L (ref 5–45)
ATRIAL RATE: 55 BPM
BASOPHILS # BLD AUTO: 0.03 THOUSANDS/ΜL (ref 0–0.1)
BASOPHILS NFR BLD AUTO: 0 % (ref 0–1)
BILIRUB SERPL-MCNC: 0.86 MG/DL (ref 0.2–1)
BUN SERPL-MCNC: 12 MG/DL (ref 5–25)
CALCIUM SERPL-MCNC: 9.1 MG/DL (ref 8.3–10.1)
CARDIAC TROPONIN I PNL SERPL HS: 4 NG/L
CARDIAC TROPONIN I PNL SERPL HS: 4 NG/L
CHLORIDE SERPL-SCNC: 100 MMOL/L (ref 96–108)
CO2 SERPL-SCNC: 29 MMOL/L (ref 21–32)
CREAT SERPL-MCNC: 1.02 MG/DL (ref 0.6–1.3)
EOSINOPHIL # BLD AUTO: 0.38 THOUSAND/ΜL (ref 0–0.61)
EOSINOPHIL NFR BLD AUTO: 5 % (ref 0–6)
ERYTHROCYTE [DISTWIDTH] IN BLOOD BY AUTOMATED COUNT: 12.3 % (ref 11.6–15.1)
GFR SERPL CREATININE-BSD FRML MDRD: 80 ML/MIN/1.73SQ M
GLUCOSE SERPL-MCNC: 151 MG/DL (ref 65–140)
HCT VFR BLD AUTO: 41.8 % (ref 36.5–49.3)
HGB BLD-MCNC: 14.4 G/DL (ref 12–17)
IMM GRANULOCYTES # BLD AUTO: 0.02 THOUSAND/UL (ref 0–0.2)
IMM GRANULOCYTES NFR BLD AUTO: 0 % (ref 0–2)
LIPASE SERPL-CCNC: 108 U/L (ref 73–393)
LYMPHOCYTES # BLD AUTO: 1.57 THOUSANDS/ΜL (ref 0.6–4.47)
LYMPHOCYTES NFR BLD AUTO: 21 % (ref 14–44)
MCH RBC QN AUTO: 30.8 PG (ref 26.8–34.3)
MCHC RBC AUTO-ENTMCNC: 34.4 G/DL (ref 31.4–37.4)
MCV RBC AUTO: 89 FL (ref 82–98)
MONOCYTES # BLD AUTO: 0.53 THOUSAND/ΜL (ref 0.17–1.22)
MONOCYTES NFR BLD AUTO: 7 % (ref 4–12)
NEUTROPHILS # BLD AUTO: 5.06 THOUSANDS/ΜL (ref 1.85–7.62)
NEUTS SEG NFR BLD AUTO: 67 % (ref 43–75)
NRBC BLD AUTO-RTO: 0 /100 WBCS
P AXIS: 44 DEGREES
PLATELET # BLD AUTO: 238 THOUSANDS/UL (ref 149–390)
PMV BLD AUTO: 9.5 FL (ref 8.9–12.7)
POTASSIUM SERPL-SCNC: 3.5 MMOL/L (ref 3.5–5.3)
PR INTERVAL: 146 MS
PROT SERPL-MCNC: 7.8 G/DL (ref 6.4–8.4)
QRS AXIS: 63 DEGREES
QRSD INTERVAL: 78 MS
QT INTERVAL: 432 MS
QTC INTERVAL: 413 MS
RBC # BLD AUTO: 4.68 MILLION/UL (ref 3.88–5.62)
SODIUM SERPL-SCNC: 139 MMOL/L (ref 135–147)
T WAVE AXIS: 32 DEGREES
VENTRICULAR RATE: 55 BPM
WBC # BLD AUTO: 7.59 THOUSAND/UL (ref 4.31–10.16)

## 2022-08-11 PROCEDURE — 96360 HYDRATION IV INFUSION INIT: CPT

## 2022-08-11 PROCEDURE — 96361 HYDRATE IV INFUSION ADD-ON: CPT

## 2022-08-11 PROCEDURE — 99285 EMERGENCY DEPT VISIT HI MDM: CPT | Performed by: PHYSICIAN ASSISTANT

## 2022-08-11 PROCEDURE — 71046 X-RAY EXAM CHEST 2 VIEWS: CPT

## 2022-08-11 PROCEDURE — 85025 COMPLETE CBC W/AUTO DIFF WBC: CPT | Performed by: PHYSICIAN ASSISTANT

## 2022-08-11 PROCEDURE — 36415 COLL VENOUS BLD VENIPUNCTURE: CPT | Performed by: PHYSICIAN ASSISTANT

## 2022-08-11 PROCEDURE — 84484 ASSAY OF TROPONIN QUANT: CPT | Performed by: PHYSICIAN ASSISTANT

## 2022-08-11 PROCEDURE — 80053 COMPREHEN METABOLIC PANEL: CPT | Performed by: PHYSICIAN ASSISTANT

## 2022-08-11 PROCEDURE — 93005 ELECTROCARDIOGRAM TRACING: CPT

## 2022-08-11 PROCEDURE — 99285 EMERGENCY DEPT VISIT HI MDM: CPT

## 2022-08-11 PROCEDURE — 93010 ELECTROCARDIOGRAM REPORT: CPT | Performed by: INTERNAL MEDICINE

## 2022-08-11 PROCEDURE — 83690 ASSAY OF LIPASE: CPT | Performed by: PHYSICIAN ASSISTANT

## 2022-08-11 RX ORDER — LORAZEPAM 1 MG/1
1 TABLET ORAL ONCE
Status: COMPLETED | OUTPATIENT
Start: 2022-08-11 | End: 2022-08-11

## 2022-08-11 RX ADMIN — SODIUM CHLORIDE 500 ML: 0.9 INJECTION, SOLUTION INTRAVENOUS at 09:38

## 2022-08-11 RX ADMIN — LORAZEPAM 1 MG: 1 TABLET ORAL at 09:37

## 2022-08-11 NOTE — ED NOTES
Patient transported to [facility] [address] Baljit Leal RN  08/11/22 2065

## 2022-08-11 NOTE — DISCHARGE INSTRUCTIONS
Continue all previously prescribed medications  Follow-up with PCP for further evaluation  Follow-up with psychiatrist for further evaluation of anxiety  Return to ED if symptoms worsen including persistent chest pain, difficulty breathing, palpitations, numbness, tingling, fevers

## 2022-08-11 NOTE — ED PROVIDER NOTES
History  Chief Complaint   Patient presents with    Chest Pain     Pt c/o cp since 01:00 this morning  Pt states he is being evicted and that his anxiety has increased  Pt has 3 nitro from EMS and 325 ASA  Pt states his symptoms have improved  Pt denies n/v/d or fevers     Patient is a 79-year-old male with a past medical history of DM, MI in 2011, CVA, HLD, lung cancer, anxiety, depression and schizophrenia who presents with chest pain since approximately 1:00 a m  This morning, 8 hours prior to arrival   Patient reports a dull aching sensation that started in the left side of his chest that radiates to his left arm  Patient states the pain has been constant since 1:00 a m  He denies any aggravating or alleviating factors  Patient did not tried anything to help alleviate his symptoms  Patient did not initially call EMS because he was home taking care of his niece who was having an epileptic seizure  Patient reports feeling very nervous and anxious with regard to his niece, as well as the fact that he is being evicted from his home  Patient was given 325 of aspirin and 3 nitro by EMS, which has improved his symptoms  Patient did note a brief episode of shortness of breath as EMS arrived, but states it resolved as quickly as it came on  Patient denies any associated nausea, diaphoresis, numbness, tingling, focal weakness, dizziness, lightheadedness, headache, vision changes, palpitations  Patient states this feels exactly like his anxiety as this is typically his symptoms  Patient states that does not feel like his prior MI  Patient has not taken his medications in a couple days because of his of action and his medications are not in their usual place  Patient states he is otherwise in his usual state of health and denies any fevers, chills, congestion, cough, abdominal pain, vomiting, diarrhea, urinary changes, leg pain or swelling    Patient also admits to doing crack last night as this helps his anxiety  Prior to Admission Medications   Prescriptions Last Dose Informant Patient Reported? Taking? FLUoxetine (PROzac) 20 mg capsule   No No   Sig: Take 1 capsule (20 mg total) by mouth daily   Patient not taking: Reported on 4/24/2020   Levothyroxine Sodium (Tirosint) 50 MCG CAPS   No No   Sig: Take 1 capsule (50 mcg total) by mouth every 24 hours   Patient not taking: Reported on 4/24/2020   Lidocaine Viscous HCl (XYLOCAINE) 2 % mucosal solution   No No   Sig: Apply small amount to affected area as needed     aspirin (ECOTRIN LOW STRENGTH) 81 mg EC tablet   No No   Sig: Take 1 tablet (81 mg total) by mouth daily   Patient not taking: Reported on 4/24/2020   atorvastatin (LIPITOR) 10 mg tablet   No No   Sig: Take 1 tablet (10 mg total) by mouth daily   Patient not taking: Reported on 4/24/2020   baclofen 10 mg tablet   Yes No   Sig: Take by mouth   benztropine (COGENTIN) 0 5 mg tablet   No No   Sig: Take 1 tablet (0 5 mg total) by mouth 2 (two) times a day   Patient not taking: Reported on 4/24/2020   cyclobenzaprine (FLEXERIL) 10 mg tablet   No No   Sig: Take 1 tablet (10 mg total) by mouth 3 (three) times a day as needed for muscle spasms   Patient not taking: Reported on 3/28/2020   diphenhydrAMINE (BENADRYL) 25 mg capsule   Yes No   Sig: take 1 Capsule (25MG)  by oral mouth bid   gabapentin (NEURONTIN) 300 mg capsule   No No   Sig: Take 1 capsule (300 mg total) by mouth 3 (three) times a day   Patient not taking: Reported on 3/28/2020   hydrOXYzine HCL (ATARAX) 50 mg tablet   Yes No   Sig: Take 50 mg by mouth every 6 (six) hours   hydrochlorothiazide (HYDRODIURIL) 12 5 mg tablet   Yes No   Sig: every 24 hours   ibuprofen (MOTRIN) 600 mg tablet   No No   Sig: Take 1 tablet (600 mg total) by mouth every 6 (six) hours as needed for mild pain for up to 10 days   lisinopril (ZESTRIL) 10 mg tablet   Yes No   Sig: Take 10 mg by mouth daily   lisinopril (ZESTRIL) 10 mg tablet   No No   Sig: Take 1 tablet (10 mg total) by mouth daily   loratadine (CLARITIN) 10 mg tablet   Yes No   Sig: every 24 hours   metFORMIN (GLUCOPHAGE) 500 mg tablet   No No   Sig: Take 1 tablet (500 mg total) by mouth 2 (two) times a day with meals   Patient not taking: Reported on 2020   rOPINIRole (REQUIP) 0 25 mg tablet   Yes No   Sig: Take 0 25 mg by mouth   risperiDONE (RisperDAL) 3 mg tablet   Yes No   Sig: 3 mg daily at bedtime   traZODone (DESYREL) 100 mg tablet   No No   Sig: Take 1 tablet (100 mg total) by mouth 2 (two) times a day      Facility-Administered Medications: None       Past Medical History:   Diagnosis Date    Arthritis     Chronic pain     [facility]     Diabetes mellitus ([facility] )     Hyperlipidemia     Hypertension     [facility]     Last Assessed:  4/7/15    Myocardial infarction [facility])     Last Assessed:  16    Psychiatric disorder        Past Surgical History:   Procedure Laterality Date    CHOLECYSTECTOMY      ELBOW SURGERY Right     ELBOW SURGERY Right     GALLBLADDER SURGERY         Family History   Problem Relation Age of Onset    Other Father         Cardiac disorder    Diabetes Father     Other Family         Cardiac disorder    Diabetes Paternal Aunt      I have reviewed and agree with the history as documented  E-Cigarette/Vaping    E-Cigarette Use Never User      E-Cigarette/Vaping Substances     Social History     Tobacco Use    Smoking status: Former Smoker     Quit date:      Years since quittin 6    Smokeless tobacco: Never Used   Vaping Use    Vaping Use: Never used   Substance Use Topics    Alcohol use: Never     Comment: Stopped drinking alcohol     Drug use: Never     Comment: Per [employer]:  History of drug use:        Review of Systems   Constitutional: Negative for chills, diaphoresis and fever  HENT: Negative for congestion, ear pain, rhinorrhea and sore throat      Eyes: Negative for visual disturbance  Respiratory: Positive for shortness of breath (briefly, resolved)  Negative for cough, wheezing and stridor  Cardiovascular: Positive for chest pain  Negative for palpitations and leg swelling  Gastrointestinal: Negative for abdominal pain, diarrhea, nausea and vomiting  Genitourinary: Negative for difficulty urinating, dysuria, frequency, hematuria and urgency  Musculoskeletal: Negative for myalgias, neck pain and neck stiffness  Skin: Negative for color change, pallor and rash  Neurological: Negative for dizziness, weakness (no change in baseline right-sided weakness), light-headedness, numbness and headaches  Psychiatric/Behavioral: The patient is nervous/anxious  All other systems reviewed and are negative  Physical Exam  Physical Exam  Vitals and nursing note reviewed  Constitutional:       General: He is awake  He is not in acute distress  Appearance: He is well-developed  He is not ill-appearing, toxic-appearing or diaphoretic  HENT:      Head: Normocephalic and atraumatic  Right Ear: External ear normal       Left Ear: External ear normal       Nose: Nose normal    Eyes:      Conjunctiva/sclera: Conjunctivae normal       Pupils: Pupils are equal, round, and reactive to light  Cardiovascular:      Rate and Rhythm: Normal rate and regular rhythm  Pulses: Normal pulses  Heart sounds: Normal heart sounds  Pulmonary:      Effort: Pulmonary effort is normal  No respiratory distress  Breath sounds: Normal breath sounds  No stridor  No decreased breath sounds, wheezing, rhonchi or rales  Abdominal:      General: Bowel sounds are normal  There is no distension  Palpations: Abdomen is soft  Tenderness: There is no abdominal tenderness  Musculoskeletal:         General: Normal range of motion  Cervical back: Normal range of motion and neck supple  Right lower leg: No edema  Left lower leg: No edema        Comments: Neurovascularly intact, 5/5 strength in bilateral upper and lower extremities  Skin:     General: Skin is warm and dry  Capillary Refill: Capillary refill takes less than 2 seconds  Neurological:      Mental Status: He is alert and oriented to person, place, and time  GCS: GCS eye subscore is 4  GCS verbal subscore is 5  GCS motor subscore is 6  Psychiatric:         Attention and Perception: He does not perceive auditory or visual hallucinations  Mood and Affect: Mood is anxious  Behavior: Behavior is cooperative           Vital Signs  ED Triage Vitals [08/11/22 0858]   Temperature Pulse Respirations Blood Pressure SpO2   98 7 °F (37 1 °C) 55 18 158/80 98 %      Temp Source Heart Rate Source Patient Position - Orthostatic VS BP Location FiO2 (%)   Oral Monitor Lying Right arm --      Pain Score       6           Vitals:    08/11/22 0858 08/11/22 1139   BP: 158/80 155/78   Pulse: 55 57   Patient Position - Orthostatic VS: Lying Lying         Visual Acuity      ED Medications  Medications   sodium chloride 0 9 % bolus 500 mL (0 mL Intravenous Stopped 8/11/22 1144)   LORazepam (ATIVAN) tablet 1 mg (1 mg Oral Given 8/11/22 0937)       Diagnostic Studies  Results Reviewed     Procedure Component Value Units Date/Time    HS Troponin I 2hr [[id number]]  (Normal) Collected: 08/11/22 1143    Lab Status: Final result Specimen: Blood from Line, Venous Updated: 08/11/22 1230     hs TnI 2hr 4 ng/L      Delta 2hr hsTnI 0 ng/L     Comprehensive metabolic panel [[id number]]  (Abnormal) Collected: 08/11/22 0940    Lab Status: Final result Specimen: Blood from Arm, Left Updated: 08/11/22 1024     Sodium 139 mmol/L      Potassium 3 5 mmol/L      Chloride 100 mmol/L      CO2 29 mmol/L      ANION GAP 10 mmol/L      BUN 12 mg/dL      Creatinine 1 02 mg/dL      Glucose 151 mg/dL      Calcium 9 1 mg/dL      AST 36 U/L      ALT 49 U/L      Alkaline Phosphatase 102 U/L      Total Protein 7 8 g/dL      Albumin 4 2 g/dL      Total Bilirubin 0 86 mg/dL      eGFR 80 ml/min/1 73sq m     Narrative:      National Kidney Disease Foundation guidelines for Chronic Kidney Disease (CKD):     Stage 1 with normal or high GFR (GFR > 90 mL/min/1 73 square meters)    Stage 2 Mild CKD (GFR = 60-89 mL/min/1 73 square meters)    Stage 3A Moderate CKD (GFR = 45-59 mL/min/1 73 square meters)    Stage 3B Moderate CKD (GFR = 30-44 mL/min/1 73 square meters)    Stage 4 Severe CKD (GFR = 15-29 mL/min/1 73 square meters)    Stage 5 End Stage CKD (GFR <15 mL/min/1 73 square meters)  Note: GFR calculation is accurate only with a steady state creatinine    Lipase [[id number]]  (Normal) Collected: 08/11/22 0940    Lab Status: Final result Specimen: Blood from Arm, Left Updated: 08/11/22 1024     Lipase 108 u/L     HS Troponin 0hr (reflex protocol) [[id number]]  (Normal) Collected: 08/11/22 0940    Lab Status: Final result Specimen: Blood from Arm, Left Updated: 08/11/22 1011     hs TnI 0hr 4 ng/L     CBC and differential [[id number]] Collected: 08/11/22 0940    Lab Status: Final result Specimen: Blood from Arm, Left Updated: 08/11/22 0946     WBC 7 59 Thousand/uL      RBC 4 68 Million/uL      Hemoglobin 14 4 g/dL      Hematocrit 41 8 %      MCV 89 fL      MCH 30 8 pg      MCHC 34 4 g/dL      RDW 12 3 %      MPV 9 5 fL      Platelets 905 Thousands/uL      nRBC 0 /100 WBCs      Neutrophils Relative 67 %      Immat GRANS % 0 %      Lymphocytes Relative 21 %      Monocytes Relative 7 %      Eosinophils Relative 5 %      Basophils Relative 0 %      Neutrophils Absolute 5 06 Thousands/µL      Immature Grans Absolute 0 02 Thousand/uL      Lymphocytes Absolute 1 57 Thousands/µL      Monocytes Absolute 0 53 Thousand/µL      Eosinophils Absolute 0 38 Thousand/µL      Basophils Absolute 0 03 Thousands/µL                  XR chest 2 views   Final Result by Rickie Pang MD (08/11 1118)      No acute cardiopulmonary disease                    Workstation performed: [id number]                    Procedures  ECG 12 Lead Documentation Only    Date/Time: 8/11/2022 9:05 AM  Performed by: Dre Saha PA-C  Authorized by: Dre Saha PA-C     Indications / Diagnosis:  Chest pain  ECG reviewed by me, the ED Provider: yes    Patient location:  ED  Previous ECG:     Previous ECG:  Compared to current    Comparison ECG info:  28Mar2020    Similarity:  No change  Rate:     ECG rate:  55    ECG rate assessment: bradycardic    Rhythm:     Rhythm: sinus rhythm    Ectopy:     Ectopy: none    QRS:     QRS axis:  Normal  Conduction:     Conduction: normal    ST segments:     ST segments:  Normal  T waves:     T waves: normal      ECG 12 Lead Documentation Only    Date/Time: 8/11/2022 11:35 AM  Performed by: Dre Saha PA-C  Authorized by: Dre Saha PA-C     Indications / Diagnosis:  Chest pain  Patient location:  ED  Previous ECG:     Previous ECG:  Compared to current    Comparison ECG info:  Earlier today    Similarity:  No change  Rate:     ECG rate:  55    ECG rate assessment: normal    Rhythm:     Rhythm: sinus rhythm    Ectopy:     Ectopy: none    QRS:     QRS axis:  Normal  Conduction:     Conduction: normal    ST segments:     ST segments:  Normal  T waves:     T waves: normal               ED Course  ED Course as of 08/11/22 1358   Thu Aug 11, 2022   1011 hs TnI 0hr: 4   1024 Anion Gap: 10   1025 Creatinine: 1 02   1025 Glucose, Random(!): 151   1025 WBC: 7 59   1025 Lipase: 108   1035 Reviewed all results with patient, answered questions  Patient notes resolution of chest pain and just notes his chronic pain  Patient states he always has chronic pain and his left shoulder and in other places, he states this is unchanged and not new  Patient declined any medication for pain    Patient states that his symptoms earlier consistent with when "his nerves get all riled up " Patient reports frustration with psychiatric condition, but denies any SI, HI, AH, VH   Patient was offered crisis evaluation, but patient states he would rather follow up with his outpatient psychiatrist    1235 hs TnI 2hr: 4             HEART Risk Score    Flowsheet Row Most Recent Value   Heart Score Risk Calculator    History 0 Filed at: 08/11/2022 1236   ECG 0 Filed at: 08/11/2022 1236   Age 1 Filed at: 08/11/2022 1236   Risk Factors 2 Filed at: 08/11/2022 1236   Troponin 0 Filed at: 08/11/2022 1236   HEART Score 3 Filed at: 08/11/2022 1236                                      MDM  Number of Diagnoses or Management Options  Anxiety  Chest pain  Diagnosis management comments: Reviewed all results with patient, answered questions  Patient with chronic pain, unchanged and patient declined any pain medication  Patient also with anxiety, nervousness  Patient does note frustration with his psychiatric conditions, increased anxiety recently and mildly increased depression, but denies any SI, HI, VH, AH  Patient was offered crisis evaluation, patient states he has been following with his outpatient psychiatrist and they have been adjusting his medications  Patient would prefer to follow-up with his psychiatrist regarding his segments and conditions  Recommended follow-up with PCP for monitoring of symptoms  Recommended follow-up with psychiatrist for further evaluation  The management plan was discussed in detail with the patient at bedside and all questions were answered  Provided both verbal and written instructions  Reviewed red flag symptoms and strict return to ED instructions  Patient notes understanding and agrees to plan        Disposition  Final diagnoses:   Chest pain   Anxiety     Time reflects when diagnosis was documented in both MDM as applicable and the Disposition within this note     Time User Action Codes Description Comment    8/11/2022 12:36 PM Dalia Russell Add [R07 9] Chest pain     [facility]11[facility]2022 12:36 [facility] [address] [F41 9] Anxiety       ED Disposition     ED Disposition   Discharge    Condition   Stable    Date/Time   Thu Aug 11, 2022 12:39 PM    Comment   Amadeo Schultz discharge to home/self care                 Follow-up Information     Follow up With Specialties Details Why [address] [facility] Emergency Department Emergency Medicine  If symptoms worsen [place] [id number]  [facility] Emergency Department, [address]  , [place], [place], [address]    Follow-up with PCP for monitoring of symptoms         Follow-up with psychiatrist for further evaluation of anxiety               Discharge Medication List as of 8/11/2022 12:40 PM      CONTINUE these medications which have NOT CHANGED    Details   aspirin (ECOTRIN LOW STRENGTH) 81 mg EC tablet Take 1 tablet (81 mg total) by mouth daily, Starting Sat 3/28/2020, Normal      atorvastatin (LIPITOR) 10 mg tablet Take 1 tablet (10 mg total) by mouth daily, Starting Sat 3/28/2020, Normal      baclofen 10 mg tablet Take by mouth, Starting Wed 10/15/2014, Historical Med      benztropine (COGENTIN) 0 5 mg tablet Take 1 tablet (0 5 mg total) by mouth 2 (two) times a day, Starting Sat 3/28/2020, Normal      cyclobenzaprine (FLEXERIL) 10 mg tablet Take 1 tablet (10 mg total) by mouth 3 (three) times a day as needed for muscle spasms, Starting Tue 6/25/2019, Normal      diphenhydrAMINE (BENADRYL) 25 mg capsule take 1 Capsule (25MG)  by oral mouth bid, Historical Med      FLUoxetine (PROzac) 20 mg capsule Take 1 capsule (20 mg total) by mouth daily, Starting Sat 3/28/2020, Normal      gabapentin (NEURONTIN) 300 mg capsule Take 1 capsule (300 mg total) by mouth 3 (three) times a day, Starting Tue 6/25/2019, Normal      hydrochlorothiazide (HYDRODIURIL) 12 5 mg tablet every 24 hours, Starting Wed 2/26/2014, Historical Med      hydrOXYzine HCL (ATARAX) 50 mg tablet Take 50 mg by mouth every 6 (six) hours, Starting Fri 5/27/2016, Historical Med      ibuprofen (MOTRIN) 600 mg tablet Take 1 tablet (600 mg total) by mouth every 6 (six) hours as needed for mild pain for up to 10 days, Starting Fri 4/24/2020, Until Mon 5/4/2020, Normal      Levothyroxine Sodium (Tirosint) 50 MCG CAPS Take 1 capsule (50 mcg total) by mouth every 24 hours, Starting Sat 3/28/2020, Normal      Lidocaine Viscous HCl (XYLOCAINE) 2 % mucosal solution Apply small amount to affected area as needed , Normal      !! lisinopril (ZESTRIL) 10 mg tablet Take 10 mg by mouth daily, Starting Thu 9/19/2019, Historical Med      !! lisinopril (ZESTRIL) 10 mg tablet Take 1 tablet (10 mg total) by mouth daily, Starting Fri 4/24/2020, Normal      loratadine (CLARITIN) 10 mg tablet every 24 hours, Starting Wed 2/26/2014, Historical Med      metFORMIN (GLUCOPHAGE) 500 mg tablet Take 1 tablet (500 mg total) by mouth 2 (two) times a day with meals, Starting Sat 3/28/2020, Normal      risperiDONE (RisperDAL) 3 mg tablet 3 mg daily at bedtime, Starting Wed 8/21/2019, Historical Med      rOPINIRole (REQUIP) 0 25 mg tablet Take 0 25 mg by mouth, Starting Fri 5/27/2016, Historical Med      traZODone (DESYREL) 100 mg tablet Take 1 tablet (100 mg total) by mouth 2 (two) times a day, Starting Sun 3/29/2020, Print       !! - Potential duplicate medications found  Please discuss with provider  No discharge procedures on file      PDMP Review     None          ED Provider  Electronically Signed by           Diana Guillory PA-C  08/11/22 5281

## 2022-08-12 LAB
ATRIAL RATE: 55 BPM
P AXIS: 60 DEGREES
PR INTERVAL: 152 MS
QRS AXIS: 64 DEGREES
QRSD INTERVAL: 82 MS
QT INTERVAL: 434 MS
QTC INTERVAL: 415 MS
T WAVE AXIS: 48 DEGREES
VENTRICULAR RATE: 55 BPM

## 2022-08-12 PROCEDURE — 93010 ELECTROCARDIOGRAM REPORT: CPT | Performed by: INTERNAL MEDICINE

## 2023-05-10 ENCOUNTER — VBI (OUTPATIENT)
Dept: ADMINISTRATIVE | Facility: OTHER | Age: 63
End: 2023-05-10

## 2023-11-07 ENCOUNTER — PATIENT OUTREACH (OUTPATIENT)
Dept: OTHER | Facility: OTHER | Age: 63
End: 2023-11-07
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
## 2023-11-14 ENCOUNTER — PATIENT OUTREACH (OUTPATIENT)
Dept: OTHER | Facility: OTHER | Age: 63
End: 2023-11-14

## 2023-11-14 NOTE — PROGRESS NOTES
Client requested his BP be checked- has not taken medsHas not yet seen street medicine. /62. Called  for street medicine asking for him to be seen at [facility] on Friday.
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
## 2023-11-21 ENCOUNTER — PATIENT OUTREACH (OUTPATIENT)
Dept: OTHER | Facility: OTHER | Age: 63
End: 2023-11-21

## 2023-11-21 NOTE — PROGRESS NOTES
11/21/23: Client requested BP check: 118/66. Has not seen [facility] yet. Gave phone number to the  again. He said he will call for appointment.

## 2024-01-24 ENCOUNTER — APPOINTMENT (EMERGENCY)
Dept: CT IMAGING | Facility: HOSPITAL | Age: 64
End: 2024-01-24
Payer: COMMERCIAL

## 2024-01-24 ENCOUNTER — HOSPITAL ENCOUNTER (EMERGENCY)
Facility: HOSPITAL | Age: 64
Discharge: HOME | End: 2024-01-24
Attending: EMERGENCY MEDICINE
Payer: COMMERCIAL

## 2024-01-24 VITALS
TEMPERATURE: 98.8 F | RESPIRATION RATE: 18 BRPM | SYSTOLIC BLOOD PRESSURE: 143 MMHG | HEART RATE: 76 BPM | OXYGEN SATURATION: 96 % | WEIGHT: 180.56 LBS | BODY MASS INDEX: 29.14 KG/M2 | DIASTOLIC BLOOD PRESSURE: 77 MMHG

## 2024-01-24 DIAGNOSIS — Z59.02: ICD-10-CM

## 2024-01-24 DIAGNOSIS — Z59.00 HOMELESSNESS: Primary | ICD-10-CM

## 2024-01-24 LAB
ALBUMIN SERPL BCP-MCNC: 4.2 G/DL (ref 3.5–5)
ALP SERPL-CCNC: 129 U/L (ref 34–104)
ALT SERPL W P-5'-P-CCNC: 12 U/L (ref 7–52)
ANION GAP SERPL CALCULATED.3IONS-SCNC: 11 MMOL/L
APAP SERPL-MCNC: <2 UG/ML (ref 10–20)
AST SERPL W P-5'-P-CCNC: 19 U/L (ref 13–39)
BASOPHILS # BLD AUTO: 0.02 THOUSANDS/ÂΜL (ref 0–0.1)
BASOPHILS NFR BLD AUTO: 0 % (ref 0–1)
BILIRUB SERPL-MCNC: 0.79 MG/DL (ref 0.2–1)
BUN SERPL-MCNC: 18 MG/DL (ref 5–25)
CALCIUM SERPL-MCNC: 9 MG/DL (ref 8.4–10.2)
CHLORIDE SERPL-SCNC: 100 MMOL/L (ref 96–108)
CO2 SERPL-SCNC: 25 MMOL/L (ref 21–32)
CREAT SERPL-MCNC: 0.83 MG/DL (ref 0.6–1.3)
EOSINOPHIL # BLD AUTO: 0.12 THOUSAND/ÂΜL (ref 0–0.61)
EOSINOPHIL NFR BLD AUTO: 1 % (ref 0–6)
ERYTHROCYTE [DISTWIDTH] IN BLOOD BY AUTOMATED COUNT: 12.4 % (ref 11.6–15.1)
ETHANOL SERPL-MCNC: <10 MG/DL
GFR SERPL CREATININE-BSD FRML MDRD: 94 ML/MIN/1.73SQ M
GLUCOSE SERPL-MCNC: 267 MG/DL (ref 65–140)
HCT VFR BLD AUTO: 44.5 % (ref 36.5–49.3)
HGB BLD-MCNC: 15.3 G/DL (ref 12–17)
IMM GRANULOCYTES # BLD AUTO: 0.02 THOUSAND/UL (ref 0–0.2)
IMM GRANULOCYTES NFR BLD AUTO: 0 % (ref 0–2)
LYMPHOCYTES # BLD AUTO: 1.01 THOUSANDS/ÂΜL (ref 0.6–4.47)
LYMPHOCYTES NFR BLD AUTO: 12 % (ref 14–44)
MAGNESIUM SERPL-MCNC: 2 MG/DL (ref 1.9–2.7)
MCH RBC QN AUTO: 29.7 PG (ref 26.8–34.3)
MCHC RBC AUTO-ENTMCNC: 34.4 G/DL (ref 31.4–37.4)
MCV RBC AUTO: 86 FL (ref 82–98)
MONOCYTES # BLD AUTO: 0.44 THOUSAND/ÂΜL (ref 0.17–1.22)
MONOCYTES NFR BLD AUTO: 5 % (ref 4–12)
NEUTROPHILS # BLD AUTO: 6.71 THOUSANDS/ÂΜL (ref 1.85–7.62)
NEUTS SEG NFR BLD AUTO: 82 % (ref 43–75)
NRBC BLD AUTO-RTO: 0 /100 WBCS
PLATELET # BLD AUTO: 215 THOUSANDS/UL (ref 149–390)
PMV BLD AUTO: 9.2 FL (ref 8.9–12.7)
POTASSIUM SERPL-SCNC: 3.7 MMOL/L (ref 3.5–5.3)
PROT SERPL-MCNC: 7.3 G/DL (ref 6.4–8.4)
RBC # BLD AUTO: 5.16 MILLION/UL (ref 3.88–5.62)
SALICYLATES SERPL-MCNC: <5 MG/DL (ref 3–20)
SODIUM SERPL-SCNC: 136 MMOL/L (ref 135–147)
WBC # BLD AUTO: 8.32 THOUSAND/UL (ref 4.31–10.16)

## 2024-01-24 PROCEDURE — 80053 COMPREHEN METABOLIC PANEL: CPT

## 2024-01-24 PROCEDURE — G1004 CDSM NDSC: HCPCS

## 2024-01-24 PROCEDURE — 82077 ASSAY SPEC XCP UR&BREATH IA: CPT

## 2024-01-24 PROCEDURE — 80179 DRUG ASSAY SALICYLATE: CPT

## 2024-01-24 PROCEDURE — 36415 COLL VENOUS BLD VENIPUNCTURE: CPT

## 2024-01-24 PROCEDURE — 85025 COMPLETE CBC W/AUTO DIFF WBC: CPT

## 2024-01-24 PROCEDURE — 99284 EMERGENCY DEPT VISIT MOD MDM: CPT

## 2024-01-24 PROCEDURE — 70450 CT HEAD/BRAIN W/O DYE: CPT

## 2024-01-24 PROCEDURE — 80143 DRUG ASSAY ACETAMINOPHEN: CPT

## 2024-01-24 PROCEDURE — 83735 ASSAY OF MAGNESIUM: CPT

## 2024-01-24 SDOH — ECONOMIC STABILITY - HOUSING INSECURITY: HOMELESSNESS UNSPECIFIED: Z59.00

## 2024-01-24 SDOH — ECONOMIC STABILITY - HOUSING INSECURITY: UNSHELTERED HOMELESSNESS: Z59.02

## 2024-01-24 NOTE — Clinical Note
Amadeo Schultz was seen and treated in our emergency department on 1/24/2024.    No restrictions            Diagnosis:     Amadeo  .    He may return on this date: 01/25/2024         If you have any questions or concerns, please don't hesitate to call.      Thuan Crocker PA-C    ______________________________           _______________          _______________  Hospital Representative                              Date                                Time

## 2024-01-25 NOTE — ED PROVIDER NOTES
History  Chief Complaint   Patient presents with    Overdose - Accidental     Pt brought by [facility], found outside of a [facility] hard to arouse. Pt A&Ox4, denies drug use, states he is homeless.      Patient is a 63-year-old male coming in after being found outside of the [facility].  Was awoken, and came into the [facility].  Denies any drug or alcohol use.  Has to be continually sleepy woken up to answer questions.  Complains of all over pain vomit but states that that is his normal      Overdose - Accidental      Prior to Admission Medications   Prescriptions Last Dose Informant Patient Reported? Taking?   FLUoxetine (PROzac) 20 mg capsule   No No   Sig: Take 1 capsule (20 mg total) by mouth daily   Patient not taking: Reported on 4/24/2020   Levothyroxine Sodium (Tirosint) 50 MCG CAPS   No No   Sig: Take 1 capsule (50 mcg total) by mouth every 24 hours   Patient not taking: Reported on 4/24/2020   Lidocaine Viscous HCl (XYLOCAINE) 2 % mucosal solution   No No   Sig: Apply small amount to affected area as needed.   aspirin (ECOTRIN LOW STRENGTH) 81 mg EC tablet   No No   Sig: Take 1 tablet (81 mg total) by mouth daily   Patient not taking: Reported on 4/24/2020   atorvastatin (LIPITOR) 10 mg tablet   No No   Sig: Take 1 tablet (10 mg total) by mouth daily   Patient not taking: Reported on 4/24/2020   baclofen 10 mg tablet   Yes No   Sig: Take by mouth   benztropine (COGENTIN) 0.5 mg tablet   No No   Sig: Take 1 tablet (0.5 mg total) by mouth 2 (two) times a day   Patient not taking: Reported on 4/24/2020   cyclobenzaprine (FLEXERIL) 10 mg tablet   No No   Sig: Take 1 tablet (10 mg total) by mouth 3 (three) times a day as needed for muscle spasms   Patient not taking: Reported on 3/28/2020   diphenhydrAMINE (BENADRYL) 25 mg capsule   Yes No   Sig: take 1 Capsule (25MG)  by oral mouth bid   gabapentin (NEURONTIN) 300 mg capsule   No No   Sig: Take 1 capsule (300 mg total) by mouth 3 (three) times a day   Patient not  taking: Reported on 3/28/2020   hydrOXYzine HCL (ATARAX) 50 mg tablet   Yes No   Sig: Take 50 mg by mouth every 6 (six) hours   hydrochlorothiazide (HYDRODIURIL) 12.5 mg tablet   Yes No   Sig: every 24 hours   ibuprofen (MOTRIN) 600 mg tablet   No No   Sig: Take 1 tablet (600 mg total) by mouth every 6 (six) hours as needed for mild pain for up to 10 days   lisinopril (ZESTRIL) 10 mg tablet   Yes No   Sig: Take 10 mg by mouth daily   lisinopril (ZESTRIL) 10 mg tablet   No No   Sig: Take 1 tablet (10 mg total) by mouth daily   loratadine (CLARITIN) 10 mg tablet   Yes No   Sig: every 24 hours   metFORMIN (GLUCOPHAGE) 500 mg tablet   No No   Sig: Take 1 tablet (500 mg total) by mouth 2 (two) times a day with meals   Patient not taking: Reported on 4/24/2020   rOPINIRole (REQUIP) 0.25 mg tablet   Yes No   Sig: Take 0.25 mg by mouth   risperiDONE (RisperDAL) 3 mg tablet   Yes No   Sig: 3 mg daily at bedtime   traZODone (DESYREL) 100 mg tablet   No No   Sig: Take 1 tablet (100 mg total) by mouth 2 (two) times a day      Facility-Administered Medications: None       Past Medical History:   Diagnosis Date    Arthritis     Chronic pain     CVA (cerebral vascular accident) (HCC) 2011    Diabetes mellitus (HCC)     Hyperlipidemia     Hypertension     Malignant neoplasm of lung (HCC) 1985    Last Assessed:  4/7/15    Myocardial infarction (HCC) 2011    Last Assessed:  9/22/16    Psychiatric disorder        Past Surgical History:   Procedure Laterality Date    CHOLECYSTECTOMY      ELBOW SURGERY Right     ELBOW SURGERY Right     GALLBLADDER SURGERY         Family History   Problem Relation Age of Onset    Other Father         Cardiac disorder    Diabetes Father     Other Family         Cardiac disorder    Diabetes Paternal Aunt      I have reviewed and agree with the history as documented.    E-Cigarette/Vaping    E-Cigarette Use Never User      E-Cigarette/Vaping Substances     Social History     Tobacco Use    Smoking status:  Former     Current packs/day: 0.00     Types: Cigarettes     Quit date:      Years since quittin.0    Smokeless tobacco: Never   Vaping Use    Vaping status: Never Used   Substance Use Topics    Alcohol use: Never     Comment: Stopped drinking alcohol     Drug use: Never     Comment: Per [employer]:  History of drug use:        Review of Systems   Unable to perform ROS: Mental status change (Falling asleep during interview)       Physical Exam  Physical Exam  Vitals reviewed.   Constitutional:       Appearance: Normal appearance. He is normal weight.   HENT:      Head: Normocephalic and atraumatic.      Comments: No signs of trauma     Right Ear: External ear normal.      Left Ear: External ear normal.      Nose: Nose normal.   Eyes:      Conjunctiva/sclera: Conjunctivae normal.   Cardiovascular:      Rate and Rhythm: Normal rate.   Pulmonary:      Effort: Pulmonary effort is normal.   Musculoskeletal:         General: Normal range of motion.      Cervical back: Normal range of motion.   Skin:     General: Skin is warm and dry.   Neurological:      Mental Status: He is alert.         Vital Signs  ED Triage Vitals   Temperature Pulse Respirations Blood Pressure SpO2   24 19024 19024   98.8 °F (37.1 °C) 90 18 121/75 96 %      Temp Source Heart Rate Source Patient Position - Orthostatic VS BP Location FiO2 (%)   24 19024 19024 --   Oral Monitor Sitting Left arm       Pain Score       --                  Vitals:    24 19024 1940 24   BP: 121/75 116/69 122/71 143/77   Pulse: 90 79 76 76   Patient Position - Orthostatic VS: Sitting Sitting Sitting Sitting         Visual Acuity      ED Medications  Medications - No data to display    Diagnostic Studies  Results Reviewed       Procedure Component Value Units Date/Time    Salicylate level [[id number]]  (Normal) Collected:  01/24/24 1930    Lab Status: Final result Specimen: Blood from Arm, Right Updated: 01/24/24 1953     Salicylate Lvl <5 mg/dL     Acetaminophen level-If concentration is detectable, please discuss with medical  on call. [[id number]]  (Abnormal) Collected: 01/24/24 1930    Lab Status: Final result Specimen: Blood from Arm, Right Updated: 01/24/24 1953     Acetaminophen Level <2 ug/mL     Ethanol [[id number]]  (Normal) Collected: 01/24/24 1930    Lab Status: Final result Specimen: Blood from Arm, Right Updated: 01/24/24 1951     Ethanol Lvl <10 mg/dL     Comprehensive metabolic panel [[id number]]  (Abnormal) Collected: 01/24/24 1930    Lab Status: Final result Specimen: Blood from Line Updated: 01/24/24 1951     Sodium 136 mmol/L      Potassium 3.7 mmol/L      Chloride 100 mmol/L      CO2 25 mmol/L      ANION GAP 11 mmol/L      BUN 18 mg/dL      Creatinine 0.83 mg/dL      Glucose 267 mg/dL      Calcium 9.0 mg/dL      AST 19 U/L      ALT 12 U/L      Alkaline Phosphatase 129 U/L      Total Protein 7.3 g/dL      Albumin 4.2 g/dL      Total Bilirubin 0.79 mg/dL      eGFR 94 ml/min/1.73sq m     Narrative:      National Kidney Disease Foundation guidelines for Chronic Kidney Disease (CKD):     Stage 1 with normal or high GFR (GFR > 90 mL/min/1.73 square meters)    Stage 2 Mild CKD (GFR = 60-89 mL/min/1.73 square meters)    Stage 3A Moderate CKD (GFR = 45-59 mL/min/1.73 square meters)    Stage 3B Moderate CKD (GFR = 30-44 mL/min/1.73 square meters)    Stage 4 Severe CKD (GFR = 15-29 mL/min/1.73 square meters)    Stage 5 End Stage CKD (GFR <15 mL/min/1.73 square meters)  Note: GFR calculation is accurate only with a steady state creatinine    Magnesium [[id number]]  (Normal) Collected: 01/24/24 1930    Lab Status: Final result Specimen: Blood from Line Updated: 01/24/24 1951     Magnesium 2.0 mg/dL     CBC and differential [[id number]]  (Abnormal) Collected: 01/24/24 1930    Lab Status: Final result Specimen: Blood from  Line Updated: 01/24/24 1940     WBC 8.32 Thousand/uL      RBC 5.16 Million/uL      Hemoglobin 15.3 g/dL      Hematocrit 44.5 %      MCV 86 fL      MCH 29.7 pg      MCHC 34.4 g/dL      RDW 12.4 %      MPV 9.2 fL      Platelets 215 Thousands/uL      nRBC 0 /100 WBCs      Neutrophils Relative 82 %      Immat GRANS % 0 %      Lymphocytes Relative 12 %      Monocytes Relative 5 %      Eosinophils Relative 1 %      Basophils Relative 0 %      Neutrophils Absolute 6.71 Thousands/µL      Immature Grans Absolute 0.02 Thousand/uL      Lymphocytes Absolute 1.01 Thousands/µL      Monocytes Absolute 0.44 Thousand/µL      Eosinophils Absolute 0.12 Thousand/µL      Basophils Absolute 0.02 Thousands/µL                    CT head without contrast   Final Result by Pedro Lee MD (01/24 2040)      No acute intracranial abnormality.                  Workstation performed: [id number]                    Procedures  Procedures         ED Course  ED Course as of 01/24/24 2124 Wed Jan 24, 2024 1951 Anion Gap: 11   1951 Glucose, Random(!): 267   2042 CT head without contrast  No acute intracranial abnormality.   2101 Patient is more awake now, answering questions.  Will give a turkey sandwich and discharged                                             Medical Decision Making  Patient is a 63-year-old male who came in for altered mental status outside the [facility].  Is now awake in no acute distress.  Labs are overall normal.  Does not have a history of drug overdoses, continues to deny drug or alcohol use.  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  Gave a turkey sandwich, and discharged home    Patient ambulated out of emergency department no acute distress, without assistance    Amount and/or Complexity of Data Reviewed  Labs: ordered. Decision-making details documented in ED Course.  Radiology: ordered. Decision-making details documented in ED Course.             Disposition  Final diagnoses:   Homelessness    Unsheltered homelessness with safe environment for sleeping     Time reflects when diagnosis was documented in both MDM as applicable and the Disposition within this note       Time User Action Codes Description Comment    1/24/2024  9:10 PM Thuan Crocker [Z59.00] Homelessness     1/24/2024  9:11 PM Thuan Crocker [Z59.02] Unsheltered homelessness with safe environment for sleeping           ED Disposition       ED Disposition   Discharge    Condition   Stable    Date/Time   Wed Jan 24, 2024  9:10 PM    Comment   Amadeo Schultz discharge to home/self care.                   Follow-up Information       Follow up With Specialties Details Why Contact Info Additional Information    [facility] Emergency Department Emergency Medicine  As needed, If symptoms worsen [address] [facility] [address] [phone] [facility] Emergency Department            Discharge Medication List as of 1/24/2024  9:11 PM        CONTINUE these medications which have NOT CHANGED    Details   aspirin (ECOTRIN LOW STRENGTH) 81 mg EC tablet Take 1 tablet (81 mg total) by mouth daily, Starting Sat 3/28/2020, Normal      atorvastatin (LIPITOR) 10 mg tablet Take 1 tablet (10 mg total) by mouth daily, Starting Sat 3/28/2020, Normal      baclofen 10 mg tablet Take by mouth, Starting Wed 10/15/2014, Historical Med      benztropine (COGENTIN) 0.5 mg tablet Take 1 tablet (0.5 mg total) by mouth 2 (two) times a day, Starting Sat 3/28/2020, Normal      cyclobenzaprine (FLEXERIL) 10 mg tablet Take 1 tablet (10 mg total) by mouth 3 (three) times a day as needed for muscle spasms, Starting Tue 6/25/2019, Normal      diphenhydrAMINE (BENADRYL) 25 mg capsule take 1 Capsule (25MG)  by oral mouth bid, Historical Med      FLUoxetine (PROzac) 20 mg capsule Take 1 capsule (20 mg total) by mouth daily, Starting Sat 3/28/2020, Normal      gabapentin (NEURONTIN) 300 mg capsule Take 1 capsule  (300 mg total) by mouth 3 (three) times a day, Starting Tue 6/25/2019, Normal      hydrochlorothiazide (HYDRODIURIL) 12.5 mg tablet every 24 hours, Starting Wed 2/26/2014, Historical Med      hydrOXYzine HCL (ATARAX) 50 mg tablet Take 50 mg by mouth every 6 (six) hours, Starting Fri 5/27/2016, Historical Med      ibuprofen (MOTRIN) 600 mg tablet Take 1 tablet (600 mg total) by mouth every 6 (six) hours as needed for mild pain for up to 10 days, Starting Fri 4/24/2020, Until Mon 5/4/2020, Normal      Levothyroxine Sodium (Tirosint) 50 MCG CAPS Take 1 capsule (50 mcg total) by mouth every 24 hours, Starting Sat 3/28/2020, Normal      Lidocaine Viscous HCl (XYLOCAINE) 2 % mucosal solution Apply small amount to affected area as needed., Normal      !! lisinopril (ZESTRIL) 10 mg tablet Take 10 mg by mouth daily, Starting Thu 9/19/2019, Historical Med      !! lisinopril (ZESTRIL) 10 mg tablet Take 1 tablet (10 mg total) by mouth daily, Starting Fri 4/24/2020, Normal      loratadine (CLARITIN) 10 mg tablet every 24 hours, Starting Wed 2/26/2014, Historical Med      metFORMIN (GLUCOPHAGE) 500 mg tablet Take 1 tablet (500 mg total) by mouth 2 (two) times a day with meals, Starting Sat 3/28/2020, Normal      risperiDONE (RisperDAL) 3 mg tablet 3 mg daily at bedtime, Starting Wed 8/21/2019, Historical Med      rOPINIRole (REQUIP) 0.25 mg tablet Take 0.25 mg by mouth, Starting Fri 5/27/2016, Historical Med      traZODone (DESYREL) 100 mg tablet Take 1 tablet (100 mg total) by mouth 2 (two) times a day, Starting Sun 3/29/2020, Print       !! - Potential duplicate medications found. Please discuss with provider.          No discharge procedures on file.    PDMP Review       None            ED Provider  Electronically Signed by             Thuan Crocker PA-C  01/24/24 0879

## 2024-01-25 NOTE — ED NOTES
Pt is awake    answering questions appropriately    States he is ready to go home     Alanna Titus RN  01/24/24 4645

## 2024-08-02 ENCOUNTER — HOSPITAL ENCOUNTER (EMERGENCY)
Facility: HOSPITAL | Age: 64
Discharge: HOME | End: 2024-08-02
Attending: EMERGENCY MEDICINE
Payer: COMMERCIAL

## 2024-08-02 VITALS
RESPIRATION RATE: 18 BRPM | HEART RATE: 74 BPM | SYSTOLIC BLOOD PRESSURE: 99 MMHG | OXYGEN SATURATION: 96 % | BODY MASS INDEX: 27.7 KG/M2 | TEMPERATURE: 97.9 F | DIASTOLIC BLOOD PRESSURE: 65 MMHG | WEIGHT: 171.6 LBS

## 2024-08-02 DIAGNOSIS — F15.10 METHAMPHETAMINE ABUSE (HCC): Primary | ICD-10-CM

## 2024-08-02 LAB
ATRIAL RATE: 68 BPM
GLUCOSE SERPL-MCNC: 261 MG/DL (ref 65–140)
P AXIS: 12 DEGREES
PR INTERVAL: 158 MS
QRS AXIS: 14 DEGREES
QRSD INTERVAL: 80 MS
QT INTERVAL: 418 MS
QTC INTERVAL: 444 MS
T WAVE AXIS: 36 DEGREES
VENTRICULAR RATE: 68 BPM

## 2024-08-02 PROCEDURE — 93005 ELECTROCARDIOGRAM TRACING: CPT

## 2024-08-02 PROCEDURE — 99284 EMERGENCY DEPT VISIT MOD MDM: CPT

## 2024-08-02 PROCEDURE — 93010 ELECTROCARDIOGRAM REPORT: CPT | Performed by: STUDENT IN AN ORGANIZED HEALTH CARE EDUCATION/TRAINING PROGRAM

## 2024-08-02 PROCEDURE — 82948 REAGENT STRIP/BLOOD GLUCOSE: CPT

## 2024-08-02 NOTE — ED PROVIDER NOTES
History  Chief Complaint   Patient presents with    Overdose - Accidental     Pt brought in by EMS, c/o headache, slurred speech and difficulty walking after the pt smoked some meth. No other complaints.      HPI    62 yo M hx of CVA 2016, DM CAD HTN bipolar disorder, substance use, presents to ed for eval after smoking meth. Patient is homeless, has  with [facility] ([facility]). Patient states he does take his home meds. Patient states he only uses meth intermittently. Not interested in rehab for meth. Patient states his symptoms of tremor and weakness started just after using meth. He now has improvement in his symptoms. States he only feels like this when does drugs. He has no chest pain. No other complaints on ROS.    Prior to Admission Medications   Prescriptions Last Dose Informant Patient Reported? Taking?   FLUoxetine (PROzac) 20 mg capsule   No No   Sig: Take 1 capsule (20 mg total) by mouth daily   Patient not taking: Reported on 4/24/2020   Levothyroxine Sodium (Tirosint) 50 MCG CAPS   No No   Sig: Take 1 capsule (50 mcg total) by mouth every 24 hours   Patient not taking: Reported on 4/24/2020   Lidocaine Viscous HCl (XYLOCAINE) 2 % mucosal solution   No No   Sig: Apply small amount to affected area as needed.   aspirin (ECOTRIN LOW STRENGTH) 81 mg EC tablet   No No   Sig: Take 1 tablet (81 mg total) by mouth daily   Patient not taking: Reported on 4/24/2020   atorvastatin (LIPITOR) 10 mg tablet   No No   Sig: Take 1 tablet (10 mg total) by mouth daily   Patient not taking: Reported on 4/24/2020   baclofen 10 mg tablet   Yes No   Sig: Take by mouth   benztropine (COGENTIN) 0.5 mg tablet   No No   Sig: Take 1 tablet (0.5 mg total) by mouth 2 (two) times a day   Patient not taking: Reported on 4/24/2020   cyclobenzaprine (FLEXERIL) 10 mg tablet   No No   Sig: Take 1 tablet (10 mg total) by mouth 3 (three) times a day as needed for muscle spasms   Patient not taking: Reported on 3/28/2020    diphenhydrAMINE (BENADRYL) 25 mg capsule   Yes No   Sig: take 1 Capsule (25MG)  by oral mouth bid   gabapentin (NEURONTIN) 300 mg capsule   No No   Sig: Take 1 capsule (300 mg total) by mouth 3 (three) times a day   Patient not taking: Reported on 3/28/2020   hydrOXYzine HCL (ATARAX) 50 mg tablet   Yes No   Sig: Take 50 mg by mouth every 6 (six) hours   hydrochlorothiazide (HYDRODIURIL) 12.5 mg tablet   Yes No   Sig: every 24 hours   ibuprofen (MOTRIN) 600 mg tablet   No No   Sig: Take 1 tablet (600 mg total) by mouth every 6 (six) hours as needed for mild pain for up to 10 days   lisinopril (ZESTRIL) 10 mg tablet   Yes No   Sig: Take 10 mg by mouth daily   lisinopril (ZESTRIL) 10 mg tablet   No No   Sig: Take 1 tablet (10 mg total) by mouth daily   loratadine (CLARITIN) 10 mg tablet   Yes No   Sig: every 24 hours   metFORMIN (GLUCOPHAGE) 500 mg tablet   No No   Sig: Take 1 tablet (500 mg total) by mouth 2 (two) times a day with meals   Patient not taking: Reported on 4/24/2020   rOPINIRole (REQUIP) 0.25 mg tablet   Yes No   Sig: Take 0.25 mg by mouth   risperiDONE (RisperDAL) 3 mg tablet   Yes No   Sig: 3 mg daily at bedtime   traZODone (DESYREL) 100 mg tablet   No No   Sig: Take 1 tablet (100 mg total) by mouth 2 (two) times a day      Facility-Administered Medications: None       Past Medical History:   Diagnosis Date    Arthritis     Chronic pain     CVA (cerebral vascular accident) (HCC) 2011    Diabetes mellitus (HCC)     Hyperlipidemia     Hypertension     Malignant neoplasm of lung (HCC) 1985    Last Assessed:  4/7/15    Myocardial infarction (HCC) 2011    Last Assessed:  9/22/16    Psychiatric disorder        Past Surgical History:   Procedure Laterality Date    CHOLECYSTECTOMY      ELBOW SURGERY Right     ELBOW SURGERY Right     GALLBLADDER SURGERY         Family History   Problem Relation Age of Onset    Other Father         Cardiac disorder    Diabetes Father     Other Family         Cardiac disorder     Diabetes Paternal Aunt      I have reviewed and agree with the history as documented.    E-Cigarette/Vaping    E-Cigarette Use Never User      E-Cigarette/Vaping Substances     Social History     Tobacco Use    Smoking status: Former     Current packs/day: 0.00     Types: Cigarettes     Quit date:      Years since quittin.5    Smokeless tobacco: Never   Vaping Use    Vaping status: Never Used   Substance Use Topics    Alcohol use: Never     Comment: Stopped drinking alcohol     Drug use: Yes     Types: Methamphetamines     Comment: Per [employer]:  History of drug use:        Review of Systems   Constitutional:  Negative for chills, fatigue and fever.   HENT:  Negative for nosebleeds and sore throat.    Eyes:  Negative for redness and visual disturbance.   Respiratory:  Negative for shortness of breath and wheezing.    Cardiovascular:  Negative for chest pain and palpitations.   Gastrointestinal:  Negative for abdominal pain and diarrhea.   Endocrine: Negative for polyuria.   Genitourinary:  Negative for difficulty urinating and testicular pain.   Musculoskeletal:  Negative for back pain and neck stiffness.   Skin:  Negative for rash and wound.   Neurological:  Positive for tremors. Negative for seizures.   Psychiatric/Behavioral:  Negative for dysphoric mood and hallucinations.    All other systems reviewed and are negative.      Physical Exam  Physical Exam  Vitals and nursing note reviewed.   Constitutional:       Appearance: He is well-developed.   HENT:      Head: Normocephalic and atraumatic.      Right Ear: External ear normal.      Left Ear: External ear normal.   Eyes:      Conjunctiva/sclera: Conjunctivae normal.   Cardiovascular:      Rate and Rhythm: Normal rate and regular rhythm.      Heart sounds: Normal heart sounds.   Pulmonary:      Effort: Pulmonary effort is normal.      Breath sounds: Normal breath sounds.   Abdominal:      General: There is no distension.      Tenderness:  There is no guarding.   Musculoskeletal:         General: Normal range of motion.      Cervical back: Normal range of motion.   Skin:     General: Skin is warm and dry.      Findings: No rash.   Neurological:      General: No focal deficit present.      Mental Status: He is alert and oriented to person, place, and time. Mental status is at baseline.      Cranial Nerves: No cranial nerve deficit.      Sensory: No sensory deficit.      Motor: No weakness or abnormal muscle tone.      Coordination: Coordination normal.      Comments: Mental Status: Alert and oriented to person place time and situation, language fluent with good comprehension and repetition.   CN: PERRLA, extraocular muscles intact without nystagmus. Face symmetrical with full sensation. Hearing in tact to bilateral finger rub. Tongue protrudes midline and palate elevates symmetrically. Sternocleidomastoid and trapezius muscle have full strength bilaterally.  Motor: Normal muscle bulk and tone throughout 5/5 strength in upper and lower extremities throughout.   Sensory: Sensation intact to light touch.   Coordination: Able to perform finger to nose to finger  Gait at baseline             Vital Signs  ED Triage Vitals [08/02/24 0014]   Temperature Pulse Respirations Blood Pressure SpO2   97.9 °F (36.6 °C) 74 18 99/65 96 %      Temp Source Heart Rate Source Patient Position - Orthostatic VS BP Location FiO2 (%)   Tympanic Monitor Lying Left arm --      Pain Score       --           Vitals:    08/02/24 0014   BP: 99/65   Pulse: 74   Patient Position - Orthostatic VS: Lying         Visual Acuity      ED Medications  Medications - No data to display    Diagnostic Studies  Results Reviewed       Procedure Component Value Units Date/Time    Fingerstick Glucose (POCT) [[id number]]  (Abnormal) Collected: 08/02/24 0031    Lab Status: Final result Specimen: Blood Updated: 08/02/24 0031     POC Glucose 261 mg/dl     Rapid drug screen, urine [[id number]]     Lab  Status: No result Specimen: Urine                    No orders to display              Procedures  Procedures         ED Course                                               Medical Decision Making  Amount and/or Complexity of Data Reviewed  Labs: ordered.      History Provided by patient     Differential considered: meth use. Patient states this was an accidental overdose, without intent to self harm.    Consideration of tests: Patient had poct glucose for hypoglycemia and EKG for arrhythmias. Will observe till clinically sober. Patient is agreeable to at least one hour of monitoring at this time.     Patient Requesting discharge. Declined Rehab / HOST. Declined outpatient Medication Assisted Treatment (MAT) as well.     SBIRT (Z71.41, Z71.51):  Screening: I have reviewed and agree with the nursing documentation below.  Brief Intervention: gave feedback about screening results, impairment, and risks while clarifying the findings, informed the patient about safe consumption limits and offered advice about change, assessed the patient's readiness to change, and negotiated goals and strategies for change  Referral to Treatment: None  Time spent with patient for SBIRT: 16    The patient was instructed to follow up as documented. Strict return precautions were discussed with the patient and the patient was instructed to return to the emergency department immediately if symptoms worsen. The patient/patient family member acknowledged and were in agreement with plan.                  Disposition  Final diagnoses:   Methamphetamine abuse (HCC)     Time reflects when diagnosis was documented in both MDM as applicable and the Disposition within this note       Time User Action Codes Description Comment    8/2/2024  2:44 AM Amy Lopez Add [F15.10] Methamphetamine abuse (HCC)           ED Disposition       ED Disposition   Left from Room after Provider Exam    Condition   --    Date/Time   Fri Aug 2, 2024  2:44 AM    Comment    --             Follow-up Information       Follow up With Specialties Details Why Contact Info Additional Information    [facility] Schedule an appointment as soon as possible for a visit in 1 week To find a primary care doctor [address], [address]  [facility] [address] [phone] [facility], [address] [place] [address], [address], [place], [place], [address] [phone]            Discharge Medication List as of 8/2/2024  2:52 AM        CONTINUE these medications which have NOT CHANGED    Details   aspirin (ECOTRIN LOW STRENGTH) 81 mg EC tablet Take 1 tablet (81 mg total) by mouth daily, Starting Sat 3/28/2020, Normal      atorvastatin (LIPITOR) 10 mg tablet Take 1 tablet (10 mg total) by mouth daily, Starting Sat 3/28/2020, Normal      baclofen 10 mg tablet Take by mouth, Starting Wed 10/15/2014, Historical Med      benztropine (COGENTIN) 0.5 mg tablet Take 1 tablet (0.5 mg total) by mouth 2 (two) times a day, Starting Sat 3/28/2020, Normal      cyclobenzaprine (FLEXERIL) 10 mg tablet Take 1 tablet (10 mg total) by mouth 3 (three) times a day as needed for muscle spasms, Starting Tue 6/25/2019, Normal      diphenhydrAMINE (BENADRYL) 25 mg capsule take 1 Capsule (25MG)  by oral mouth bid, Historical Med      FLUoxetine (PROzac) 20 mg capsule Take 1 capsule (20 mg total) by mouth daily, Starting Sat 3/28/2020, Normal      gabapentin (NEURONTIN) 300 mg capsule Take 1 capsule (300 mg total) by mouth 3 (three) times a day, Starting Tue 6/25/2019, Normal      hydrochlorothiazide (HYDRODIURIL) 12.5 mg tablet every 24 hours, Starting Wed 2/26/2014, Historical Med      hydrOXYzine HCL (ATARAX) 50 mg tablet Take 50 mg by mouth every 6 (six) hours, Starting Fri 5/27/2016, Historical Med      ibuprofen (MOTRIN) 600 mg tablet Take 1 tablet (600 mg total) by mouth every 6 (six) hours as needed for mild pain for up to 10  days, Starting Fri 4/24/2020, Until Mon 5/4/2020, Normal      Levothyroxine Sodium (Tirosint) 50 MCG CAPS Take 1 capsule (50 mcg total) by mouth every 24 hours, Starting Sat 3/28/2020, Normal      Lidocaine Viscous HCl (XYLOCAINE) 2 % mucosal solution Apply small amount to affected area as needed., Normal      !! lisinopril (ZESTRIL) 10 mg tablet Take 10 mg by mouth daily, Starting Thu 9/19/2019, Historical Med      !! lisinopril (ZESTRIL) 10 mg tablet Take 1 tablet (10 mg total) by mouth daily, Starting Fri 4/24/2020, Normal      loratadine (CLARITIN) 10 mg tablet every 24 hours, Starting Wed 2/26/2014, Historical Med      metFORMIN (GLUCOPHAGE) 500 mg tablet Take 1 tablet (500 mg total) by mouth 2 (two) times a day with meals, Starting Sat 3/28/2020, Normal      risperiDONE (RisperDAL) 3 mg tablet 3 mg daily at bedtime, Starting Wed 8/21/2019, Historical Med      rOPINIRole (REQUIP) 0.25 mg tablet Take 0.25 mg by mouth, Starting Fri 5/27/2016, Historical Med      traZODone (DESYREL) 100 mg tablet Take 1 tablet (100 mg total) by mouth 2 (two) times a day, Starting Sun 3/29/2020, Print       !! - Potential duplicate medications found. Please discuss with provider.          No discharge procedures on file.    PDMP Review       None            ED Provider  Electronically Signed by             Amy Lopez MD  08/02/24 4532

## 2024-11-05 ENCOUNTER — APPOINTMENT (EMERGENCY)
Dept: RADIOLOGY | Facility: HOSPITAL | Age: 64
DRG: 207 | End: 2024-11-05
Payer: COMMERCIAL

## 2024-11-05 ENCOUNTER — APPOINTMENT (EMERGENCY)
Dept: CT IMAGING | Facility: HOSPITAL | Age: 64
DRG: 207 | End: 2024-11-05
Payer: COMMERCIAL

## 2024-11-05 ENCOUNTER — HOSPITAL ENCOUNTER (INPATIENT)
Facility: HOSPITAL | Age: 64
LOS: 2 days | Discharge: HOME | DRG: 207 | End: 2024-11-07
Attending: EMERGENCY MEDICINE | Admitting: HOSPITALIST
Payer: COMMERCIAL

## 2024-11-05 DIAGNOSIS — R79.89 LACTATE BLOOD INCREASED: ICD-10-CM

## 2024-11-05 DIAGNOSIS — R29.90 STROKE-LIKE SYMPTOM: Primary | ICD-10-CM

## 2024-11-05 DIAGNOSIS — N17.9 AKI (ACUTE KIDNEY INJURY) (HCC): ICD-10-CM

## 2024-11-05 DIAGNOSIS — I95.9 HYPOTENSION: ICD-10-CM

## 2024-11-05 PROBLEM — G45.9 TIA (TRANSIENT ISCHEMIC ATTACK): Status: ACTIVE | Noted: 2024-11-05

## 2024-11-05 PROBLEM — F99 PSYCHIATRIC DISORDER: Status: ACTIVE | Noted: 2024-11-05

## 2024-11-05 LAB
2HR DELTA HS TROPONIN: 2 NG/L
4HR DELTA HS TROPONIN: 2 NG/L
AMPHETAMINES SERPL QL SCN: NEGATIVE
ANION GAP SERPL CALCULATED.3IONS-SCNC: 7 MMOL/L (ref 4–13)
APTT PPP: 25 SECONDS (ref 23–34)
ATRIAL RATE: 441 BPM
ATRIAL RATE: 70 BPM
BACTERIA UR QL AUTO: ABNORMAL /HPF
BARBITURATES UR QL: NEGATIVE
BASE EX.OXY STD BLDV CALC-SCNC: 55.8 % (ref 60–80)
BASE EXCESS BLDV CALC-SCNC: -3.1 MMOL/L
BENZODIAZ UR QL: NEGATIVE
BILIRUB UR QL STRIP: NEGATIVE
BUN SERPL-MCNC: 25 MG/DL (ref 5–25)
CALCIUM SERPL-MCNC: 8.6 MG/DL (ref 8.4–10.2)
CARDIAC TROPONIN I PNL SERPL HS: 12 NG/L
CARDIAC TROPONIN I PNL SERPL HS: 14 NG/L
CARDIAC TROPONIN I PNL SERPL HS: 14 NG/L
CHLORIDE SERPL-SCNC: 100 MMOL/L (ref 96–108)
CLARITY UR: CLEAR
CO2 SERPL-SCNC: 28 MMOL/L (ref 21–32)
COCAINE UR QL: NEGATIVE
COLOR UR: ABNORMAL
CREAT SERPL-MCNC: 2.14 MG/DL (ref 0.6–1.3)
ERYTHROCYTE [DISTWIDTH] IN BLOOD BY AUTOMATED COUNT: 12.5 % (ref 11.6–15.1)
FENTANYL UR QL SCN: NEGATIVE
GFR SERPL CREATININE-BSD FRML MDRD: 32 ML/MIN/1.73SQ M
GLUCOSE SERPL-MCNC: 247 MG/DL (ref 65–140)
GLUCOSE SERPL-MCNC: 281 MG/DL (ref 65–140)
GLUCOSE UR STRIP-MCNC: ABNORMAL MG/DL
HCO3 BLDV-SCNC: 23.7 MMOL/L (ref 24–30)
HCT VFR BLD AUTO: 34.8 % (ref 36.5–49.3)
HGB BLD-MCNC: 11.7 G/DL (ref 12–17)
HGB UR QL STRIP.AUTO: NEGATIVE
HYALINE CASTS #/AREA URNS LPF: ABNORMAL /LPF
HYDROCODONE UR QL SCN: NEGATIVE
INR PPP: 1.2 (ref 0.85–1.19)
KETONES UR STRIP-MCNC: NEGATIVE MG/DL
LACTATE SERPL-SCNC: 0.8 MMOL/L (ref 0.5–2)
LACTATE SERPL-SCNC: 2.9 MMOL/L (ref 0.5–2)
LEUKOCYTE ESTERASE UR QL STRIP: NEGATIVE
MCH RBC QN AUTO: 30 PG (ref 26.8–34.3)
MCHC RBC AUTO-ENTMCNC: 33.6 G/DL (ref 31.4–37.4)
MCV RBC AUTO: 89 FL (ref 82–98)
METHADONE UR QL: NEGATIVE
MUCOUS THREADS UR QL AUTO: ABNORMAL
NITRITE UR QL STRIP: NEGATIVE
NON-SQ EPI CELLS URNS QL MICRO: ABNORMAL /HPF
O2 CT BLDV-SCNC: 9.6 ML/DL
OPIATES UR QL SCN: NEGATIVE
OXYCODONE+OXYMORPHONE UR QL SCN: NEGATIVE
P AXIS: 65 DEGREES
PCO2 BLDV: 49.7 MM HG (ref 42–50)
PCP UR QL: NEGATIVE
PH BLDV: 7.3 [PH] (ref 7.3–7.4)
PH UR STRIP.AUTO: 5.5 [PH]
PLATELET # BLD AUTO: 160 THOUSANDS/UL (ref 149–390)
PLATELET # BLD AUTO: 169 THOUSANDS/UL (ref 149–390)
PMV BLD AUTO: 9.2 FL (ref 8.9–12.7)
PMV BLD AUTO: 9.4 FL (ref 8.9–12.7)
PO2 BLDV: 32.1 MM HG (ref 35–45)
POTASSIUM SERPL-SCNC: 3.7 MMOL/L (ref 3.5–5.3)
PR INTERVAL: 148 MS
PROT UR STRIP-MCNC: NEGATIVE MG/DL
PROTHROMBIN TIME: 15.3 SECONDS (ref 12.3–15)
QRS AXIS: 64 DEGREES
QRS AXIS: 65 DEGREES
QRSD INTERVAL: 74 MS
QRSD INTERVAL: 78 MS
QT INTERVAL: 358 MS
QT INTERVAL: 414 MS
QTC INTERVAL: 405 MS
QTC INTERVAL: 447 MS
RBC # BLD AUTO: 3.9 MILLION/UL (ref 3.88–5.62)
RBC #/AREA URNS AUTO: ABNORMAL /HPF
SODIUM SERPL-SCNC: 135 MMOL/L (ref 135–147)
SP GR UR STRIP.AUTO: 1.02 (ref 1–1.03)
T WAVE AXIS: 32 DEGREES
T WAVE AXIS: 38 DEGREES
THC UR QL: NEGATIVE
TRANS CELLS #/AREA URNS HPF: PRESENT /[HPF]
UROBILINOGEN UR STRIP-ACNC: <2 MG/DL
VENTRICULAR RATE: 70 BPM
VENTRICULAR RATE: 77 BPM
WBC # BLD AUTO: 9.14 THOUSAND/UL (ref 4.31–10.16)
WBC #/AREA URNS AUTO: ABNORMAL /HPF

## 2024-11-05 PROCEDURE — 96367 TX/PROPH/DG ADDL SEQ IV INF: CPT

## 2024-11-05 PROCEDURE — 93010 ELECTROCARDIOGRAM REPORT: CPT | Performed by: STUDENT IN AN ORGANIZED HEALTH CARE EDUCATION/TRAINING PROGRAM

## 2024-11-05 PROCEDURE — 83605 ASSAY OF LACTIC ACID: CPT | Performed by: EMERGENCY MEDICINE

## 2024-11-05 PROCEDURE — 85049 AUTOMATED PLATELET COUNT: CPT | Performed by: HOSPITALIST

## 2024-11-05 PROCEDURE — 85730 THROMBOPLASTIN TIME PARTIAL: CPT | Performed by: EMERGENCY MEDICINE

## 2024-11-05 PROCEDURE — 84484 ASSAY OF TROPONIN QUANT: CPT | Performed by: HOSPITALIST

## 2024-11-05 PROCEDURE — 82948 REAGENT STRIP/BLOOD GLUCOSE: CPT

## 2024-11-05 PROCEDURE — 99291 CRITICAL CARE FIRST HOUR: CPT | Performed by: EMERGENCY MEDICINE

## 2024-11-05 PROCEDURE — 82805 BLOOD GASES W/O2 SATURATION: CPT | Performed by: EMERGENCY MEDICINE

## 2024-11-05 PROCEDURE — 99285 EMERGENCY DEPT VISIT HI MDM: CPT

## 2024-11-05 PROCEDURE — 96375 TX/PRO/DX INJ NEW DRUG ADDON: CPT

## 2024-11-05 PROCEDURE — 80048 BASIC METABOLIC PNL TOTAL CA: CPT | Performed by: EMERGENCY MEDICINE

## 2024-11-05 PROCEDURE — 71045 X-RAY EXAM CHEST 1 VIEW: CPT

## 2024-11-05 PROCEDURE — 36415 COLL VENOUS BLD VENIPUNCTURE: CPT | Performed by: EMERGENCY MEDICINE

## 2024-11-05 PROCEDURE — 87040 BLOOD CULTURE FOR BACTERIA: CPT | Performed by: EMERGENCY MEDICINE

## 2024-11-05 PROCEDURE — 93010 ELECTROCARDIOGRAM REPORT: CPT | Performed by: INTERNAL MEDICINE

## 2024-11-05 PROCEDURE — 80307 DRUG TEST PRSMV CHEM ANLYZR: CPT | Performed by: EMERGENCY MEDICINE

## 2024-11-05 PROCEDURE — 81001 URINALYSIS AUTO W/SCOPE: CPT | Performed by: EMERGENCY MEDICINE

## 2024-11-05 PROCEDURE — 99223 1ST HOSP IP/OBS HIGH 75: CPT | Performed by: HOSPITALIST

## 2024-11-05 PROCEDURE — 85610 PROTHROMBIN TIME: CPT | Performed by: EMERGENCY MEDICINE

## 2024-11-05 PROCEDURE — 85027 COMPLETE CBC AUTOMATED: CPT | Performed by: EMERGENCY MEDICINE

## 2024-11-05 PROCEDURE — 96365 THER/PROPH/DIAG IV INF INIT: CPT

## 2024-11-05 PROCEDURE — 83605 ASSAY OF LACTIC ACID: CPT | Performed by: HOSPITALIST

## 2024-11-05 PROCEDURE — 70498 CT ANGIOGRAPHY NECK: CPT

## 2024-11-05 PROCEDURE — 84484 ASSAY OF TROPONIN QUANT: CPT | Performed by: EMERGENCY MEDICINE

## 2024-11-05 PROCEDURE — 93005 ELECTROCARDIOGRAM TRACING: CPT

## 2024-11-05 PROCEDURE — 70496 CT ANGIOGRAPHY HEAD: CPT

## 2024-11-05 PROCEDURE — 96368 THER/DIAG CONCURRENT INF: CPT

## 2024-11-05 RX ORDER — ASPIRIN 325 MG
325 TABLET ORAL DAILY
Status: DISCONTINUED | OUTPATIENT
Start: 2024-11-06 | End: 2024-11-06

## 2024-11-05 RX ORDER — NOREPINEPHRINE BITARTRATE 1 MG/ML
1 INJECTION, SOLUTION INTRAVENOUS ONCE
Status: COMPLETED | OUTPATIENT
Start: 2024-11-05 | End: 2024-11-05

## 2024-11-05 RX ORDER — ATORVASTATIN CALCIUM 10 MG/1
10 TABLET, FILM COATED ORAL DAILY
Status: DISCONTINUED | OUTPATIENT
Start: 2024-11-06 | End: 2024-11-05

## 2024-11-05 RX ORDER — SODIUM CHLORIDE, SODIUM LACTATE, POTASSIUM CHLORIDE, CALCIUM CHLORIDE 600; 310; 30; 20 MG/100ML; MG/100ML; MG/100ML; MG/100ML
125 INJECTION, SOLUTION INTRAVENOUS CONTINUOUS
Status: DISCONTINUED | OUTPATIENT
Start: 2024-11-05 | End: 2024-11-07 | Stop reason: HOSPADM

## 2024-11-05 RX ORDER — SODIUM CHLORIDE, SODIUM LACTATE, POTASSIUM CHLORIDE, CALCIUM CHLORIDE 600; 310; 30; 20 MG/100ML; MG/100ML; MG/100ML; MG/100ML
125 INJECTION, SOLUTION INTRAVENOUS CONTINUOUS
Status: DISCONTINUED | OUTPATIENT
Start: 2024-11-05 | End: 2024-11-05

## 2024-11-05 RX ORDER — ENOXAPARIN SODIUM 100 MG/ML
40 INJECTION SUBCUTANEOUS DAILY
Status: DISCONTINUED | OUTPATIENT
Start: 2024-11-06 | End: 2024-11-05

## 2024-11-05 RX ORDER — BENZTROPINE MESYLATE 1 MG/1
0.5 TABLET ORAL 2 TIMES DAILY
Status: DISCONTINUED | OUTPATIENT
Start: 2024-11-05 | End: 2024-11-07 | Stop reason: HOSPADM

## 2024-11-05 RX ORDER — ROPINIROLE 0.25 MG/1
0.25 TABLET, FILM COATED ORAL
Status: DISCONTINUED | OUTPATIENT
Start: 2024-11-05 | End: 2024-11-07 | Stop reason: HOSPADM

## 2024-11-05 RX ORDER — TRAZODONE HYDROCHLORIDE 100 MG/1
100 TABLET ORAL EVERY 12 HOURS SCHEDULED
Status: DISCONTINUED | OUTPATIENT
Start: 2024-11-05 | End: 2024-11-07 | Stop reason: HOSPADM

## 2024-11-05 RX ORDER — RISPERIDONE 1 MG/1
3 TABLET ORAL
Status: DISCONTINUED | OUTPATIENT
Start: 2024-11-05 | End: 2024-11-07 | Stop reason: HOSPADM

## 2024-11-05 RX ORDER — HEPARIN SODIUM 5000 [USP'U]/ML
5000 INJECTION, SOLUTION INTRAVENOUS; SUBCUTANEOUS EVERY 8 HOURS SCHEDULED
Status: DISCONTINUED | OUTPATIENT
Start: 2024-11-05 | End: 2024-11-07 | Stop reason: HOSPADM

## 2024-11-05 RX ORDER — HYDROXYZINE HYDROCHLORIDE 25 MG/1
50 TABLET, FILM COATED ORAL EVERY 6 HOURS
Status: DISCONTINUED | OUTPATIENT
Start: 2024-11-05 | End: 2024-11-07 | Stop reason: HOSPADM

## 2024-11-05 RX ORDER — LEVOTHYROXINE SODIUM 50 UG/1
50 TABLET ORAL
Status: DISCONTINUED | OUTPATIENT
Start: 2024-11-06 | End: 2024-11-07 | Stop reason: HOSPADM

## 2024-11-05 RX ORDER — ATORVASTATIN CALCIUM 40 MG/1
40 TABLET, FILM COATED ORAL EVERY EVENING
Status: DISCONTINUED | OUTPATIENT
Start: 2024-11-05 | End: 2024-11-07 | Stop reason: HOSPADM

## 2024-11-05 RX ADMIN — CEFEPIME 2000 MG: 2 INJECTION, POWDER, FOR SOLUTION INTRAVENOUS at 17:26

## 2024-11-05 RX ADMIN — BENZTROPINE MESYLATE 0.5 MG: 1 TABLET ORAL at 21:58

## 2024-11-05 RX ADMIN — HYDROXYZINE HYDROCHLORIDE 50 MG: 25 TABLET ORAL at 21:58

## 2024-11-05 RX ADMIN — SODIUM CHLORIDE, SODIUM LACTATE, POTASSIUM CHLORIDE, AND CALCIUM CHLORIDE 125 ML/HR: .6; .31; .03; .02 INJECTION, SOLUTION INTRAVENOUS at 18:13

## 2024-11-05 RX ADMIN — SODIUM CHLORIDE, SODIUM LACTATE, POTASSIUM CHLORIDE, AND CALCIUM CHLORIDE 125 ML/HR: .6; .31; .03; .02 INJECTION, SOLUTION INTRAVENOUS at 21:01

## 2024-11-05 RX ADMIN — VANCOMYCIN HYDROCHLORIDE 1500 MG: 500 INJECTION, POWDER, LYOPHILIZED, FOR SOLUTION INTRAVENOUS at 18:05

## 2024-11-05 RX ADMIN — RISPERIDONE 3 MG: 1 TABLET, FILM COATED ORAL at 21:58

## 2024-11-05 RX ADMIN — ROPINIROLE HYDROCHLORIDE 0.25 MG: 0.25 TABLET, FILM COATED ORAL at 22:54

## 2024-11-05 RX ADMIN — HEPARIN SODIUM 5000 UNITS: 5000 INJECTION INTRAVENOUS; SUBCUTANEOUS at 21:57

## 2024-11-05 RX ADMIN — ATORVASTATIN CALCIUM 40 MG: 40 TABLET, FILM COATED ORAL at 21:58

## 2024-11-05 RX ADMIN — SODIUM CHLORIDE, SODIUM LACTATE, POTASSIUM CHLORIDE, AND CALCIUM CHLORIDE 1000 ML: .6; .31; .03; .02 INJECTION, SOLUTION INTRAVENOUS at 16:41

## 2024-11-05 RX ADMIN — IOHEXOL 85 ML: 350 INJECTION, SOLUTION INTRAVENOUS at 16:34

## 2024-11-05 RX ADMIN — TRAZODONE HYDROCHLORIDE 100 MG: 100 TABLET ORAL at 21:58

## 2024-11-05 NOTE — ED PROVIDER NOTES
Time reflects when diagnosis was documented in both MDM as applicable and the Disposition within this note       Time User Action Codes Description Comment    11/5/2024  4:26 PM Jaam Jo [R29.90] Stroke-like symptom     11/5/2024  5:23 PM Jama Jo [N17.9] JARED (acute kidney injury) (HCC)     11/5/2024  5:23 PM Jama Jo [I95.9] Hypotension     11/5/2024  6:19 PM Jama Jo [R79.89] Lactate blood increased           ED Disposition       ED Disposition   Admit    Condition   Stable    Date/Time   Tue Nov 5, 2024  6:16 PM    Comment   Case was discussed with MARIA TERESA and the patient's admission status was agreed to be Admission Status: inpatient status to the service of Dr. Marley .               Assessment & Plan       Medical Decision Making  64-year-old male presenting with strokelike symptoms starting 1 hour prior to arrival.  On evaluation patient does have some mild aphasia, slurring of his speech at times.  No focal deficit, no ataxia with finger-nose-finger.  IV fluids have been initiated.  Stroke alert called and spoke with neurology.  Patient is hypotensive, IV fluids.  Concern for other acute stroke versus worsening of previous reported stroke in the setting of hypotension.    Amount and/or Complexity of Data Reviewed  Independent Historian: friend and EMS  Labs: ordered. Decision-making details documented in ED Course.  Radiology: ordered.    Risk  Prescription drug management.  Decision regarding hospitalization.        ED Course as of 11/05/24 1819   Tue Nov 05, 2024   1705 Stroke alert called.  Discussed with Dr. Arriaga of neurology.  Bedside evaluation, NIH of 2.  Based on their evaluation, less likely stroke will perform workup based on stroke pathway.  Pneumonia not excluded on stroke CTs.  Plan for the empiric treatment for sepsis given hypotension of pulmonary source.  Will obtain blood cultures, give empiric antibiotics.  Patient does have significant JARED with  creatinine of 2.14.  He does appear dry on exam.  Patient understands he will be admitted for further evaluation.   1723 Creatinine(!): 2.14   1729 Blood Pressure: 102/55  Blood pressure is improving.  Likely secondary to hypovolemia.  CT imaging reported patchy airspace disease.  In the setting of hypotension, will initiate sepsis workup with broad-spectrum antibiotics, cultures.    FAST exam negative for fluid in the abdomen.  No pericardial effusion.  Did not appreciate large right ventricle   1804 LACTIC ACID(!): 2.9   1815 Kindly accepted for inpatient by [facility]       Medications   vancomycin (VANCOCIN) 1500 mg in sodium chloride 0.9% 250 mL IVPB (1,500 mg Intravenous New Bag 11/5/24 1805)   NOREPINEPHRINE 4 MG  ML NSS (CMPD ORDER) infusion (0 mcg/min Intravenous Hold 11/5/24 1813)   lactated ringers bolus 1,000 mL (0 mL Intravenous Stopped 11/5/24 1802)   lactated ringers infusion (125 mL/hr Intravenous New Bag 11/5/24 1813)   lactated ringers bolus 1,000 mL (0 mL Intravenous Stopped 11/5/24 1734)   norepinephrine (LEVOPHED) 1 mg/mL injection 4 mg (0 mg Does not apply Given to EMS 11/5/24 1733)   iohexol (OMNIPAQUE) 350 MG/ML injection (SINGLE-DOSE) 85 mL (85 mL Intravenous Given 11/5/24 1634)   cefepime (MAXIPIME) 2 g/50 mL dextrose IVPB (0 mg Intravenous Stopped 11/5/24 1805)       ED Risk Strat Scores                           SBIRT 22yo+      Flowsheet Row Most Recent Value   Initial Alcohol Screen: US AUDIT-C     1. How often do you have a drink containing alcohol? 0 Filed at: 11/05/2024 1727   2. How many drinks containing alcohol do you have on a typical day you are drinking?  0 Filed at: 11/05/2024 1727   3a. Male UNDER 65: How often do you have five or more drinks on one occasion? 0 Filed at: 11/05/2024 1727   3b. FEMALE Any Age, or MALE 65+: How often do you have 4 or more drinks on one occassion? 0 Filed at: 11/05/2024 1727   Audit-C Score 0 Filed at: 11/05/2024 1727   BRIANNA: How many times in  the past year have you...    Used an illegal drug or used a prescription medication for non-medical reasons? Never Filed at: 2024 3165                            History of Present Illness       Chief Complaint   Patient presents with    CVA/TIA-like Symptoms     Pt arrives via EMS. Pt from [facility]. Starting an hr ago he had left sided facial droop and slurred speech. Pt had 70/30 pressures. Pt started on levo in EMS. Pt hx of strokes before.        Past Medical History:   Diagnosis Date    Arthritis     Chronic pain     CVA (cerebral vascular accident) (HCC)     Diabetes mellitus (HCC)     Hyperlipidemia     Hypertension     Malignant neoplasm of lung (HCC)     Last Assessed:  4/7/15    Myocardial infarction (HCC)     Last Assessed:  16    Psychiatric disorder       Past Surgical History:   Procedure Laterality Date    CHOLECYSTECTOMY      ELBOW SURGERY Right     ELBOW SURGERY Right     GALLBLADDER SURGERY        Family History   Problem Relation Age of Onset    Other Father         Cardiac disorder    Diabetes Father     Other Family         Cardiac disorder    Diabetes Paternal Aunt       Social History     Tobacco Use    Smoking status: Former     Current packs/day: 0.00     Types: Cigarettes     Quit date:      Years since quittin.8    Smokeless tobacco: Never   Vaping Use    Vaping status: Never Used   Substance Use Topics    Alcohol use: Never     Comment: Stopped drinking alcohol     Drug use: Yes     Types: Methamphetamines     Comment: Per [employer]:  History of drug use: 2005      E-Cigarette/Vaping    E-Cigarette Use Never User       E-Cigarette/Vaping Substances      I have reviewed and agree with the history as documented.     64-year-old male past history of stroke, methamphetamine use disorder presents, hypertension, hyperlipidemia presents to the emergency department via EMS from [facility] for onset of slurred speech, right facial droop.  EMS reported this  occurred 1 hour prior to EMS arrival.  Patient does have a history of stroke.  Did feel lightheadedness when he was stood up.  Hypotensive for EMS 70s systolic which started to improve with IV fluids.  Patient denies any history of anticoagulation but has been taking his aspirin.    After stroke workup was initiated patient did report that he did not feel well beginning yesterday evening.  Did report that his start patient was worse than usual.  Denying any headache, chest pain, shortness of breath, nausea, vomiting, abdominal pain.    A representative from the [employer] arrived to the emergency department and said that  was seen she saw him normal at 7 AM.  Did not have any of the slurring speech.  Reports that he is a registered sex offender and has to sleep on the benches        CVA/TIA-like Symptoms  Presenting symptoms: no headaches and no weakness    Associated symptoms: no chest pain, no fever, no nausea, no seizures and no vomiting        Review of Systems   Constitutional:  Negative for chills and fever.   Eyes:  Negative for visual disturbance.   Respiratory:  Negative for cough and shortness of breath.    Cardiovascular:  Negative for chest pain and palpitations.   Gastrointestinal:  Negative for abdominal pain, nausea and vomiting.   Genitourinary:  Negative for dysuria and hematuria.   Neurological:  Positive for facial asymmetry and speech difficulty. Negative for seizures, weakness, light-headedness and headaches.           Objective       ED Triage Vitals   Temperature Pulse Blood Pressure Respirations SpO2 Patient Position - Orthostatic VS   11/05/24 1704 11/05/24 1619 11/05/24 1617 11/05/24 1619 11/05/24 1619 11/05/24 1619   98.6 °F (37 °C) 95 (!) 82/49 18 98 % Lying      Temp src Heart Rate Source BP Location FiO2 (%) Pain Score    -- 11/05/24 1619 11/05/24 1619 -- --     Monitor Right arm        Vitals      Date and Time Temp Pulse SpO2 Resp BP Pain Score FACES Pain Rating User   11/05/24 0436 -- --  -- -- 106/64 -- -- SR   11/05/24 1750 -- 74 97 % 18 92/55 -- -- SR   11/05/24 1730 -- 76 98 % 18 107/56 -- -- SR   11/05/24 1720 -- 75 99 % 18 105/77 -- -- SR   11/05/24 1705 -- 75 99 % 18 102/55 -- -- SR   11/05/24 1704 98.6 °F (37 °C) -- -- -- -- -- -- SR   11/05/24 1700 -- 74 -- 21 -- -- -- SR   11/05/24 1659 -- -- -- -- 102/51 -- -- SR   11/05/24 1659 -- -- 92 % -- -- -- -- SR   11/05/24 1657 -- -- 84 % -- -- -- -- SR   11/05/24 1655 -- 76 -- 21 -- -- -- SR   11/05/24 1652 -- -- -- -- 77/45 -- -- SR   11/05/24 1650 -- 75 98 % 16 77/45 -- -- SR   11/05/24 1649 -- -- 98 % -- -- -- -- SR   11/05/24 1646 -- -- -- -- 84/50 -- -- SR   11/05/24 1635 -- 75 99 % 18 78/41 -- -- SR   11/05/24 1620 -- 84 100 % -- -- -- -- SR   11/05/24 1619 -- 95 98 % 18 82/49 -- -- SR   11/05/24 1617 -- -- -- -- 82/49 -- -- SR            Physical Exam  Vitals and nursing note reviewed.   Constitutional:       Appearance: He is well-developed. He is ill-appearing. He is not diaphoretic.   HENT:      Head: Normocephalic and atraumatic.      Right Ear: External ear normal.      Left Ear: External ear normal.      Nose: Nose normal.      Mouth/Throat:      Mouth: Mucous membranes are dry. No angioedema.      Dentition: Abnormal dentition.   Eyes:      Conjunctiva/sclera: Conjunctivae normal.      Pupils: Pupils are equal, round, and reactive to light.   Cardiovascular:      Rate and Rhythm: Normal rate and regular rhythm.      Pulses: Normal pulses.      Heart sounds: No murmur heard.  Pulmonary:      Effort: Pulmonary effort is normal. No respiratory distress.      Breath sounds: Normal breath sounds.   Abdominal:      General: Abdomen is flat.      Palpations: Abdomen is soft.      Tenderness: There is no abdominal tenderness.   Musculoskeletal:         General: No swelling. Normal range of motion.      Cervical back: Normal range of motion and neck supple.      Right lower leg: No edema.      Left lower leg: No edema.   Skin:     General:  Skin is warm and dry.      Capillary Refill: Capillary refill takes less than 2 seconds.   Neurological:      General: No focal deficit present.      Mental Status: He is alert.      Cranial Nerves: Dysarthria and facial asymmetry present.      Motor: No abnormal muscle tone, seizure activity or pronator drift.      Coordination: Finger-Nose-Finger Test and Heel to Shin Test normal.      Comments: Patient has aphasia and mild dysarthria  Lower right-sided facial droop  Is able to perform finger-nose-finger  No pronator drift   Psychiatric:         Attention and Perception: Attention normal.         Mood and Affect: Mood normal.         Results Reviewed       Procedure Component Value Units Date/Time    Lactic acid, plasma (w/reflex if result > 2.0) [[id number]]  (Abnormal) Collected: 11/05/24 1724    Lab Status: Final result Specimen: Blood from Arm, Right Updated: 11/05/24 1754     LACTIC ACID 2.9 mmol/L     Narrative:      Result may be elevated if tourniquet was used during collection.    Lactic acid 2 Hours [[id number]]     Lab Status: No result Specimen: Blood     Blood gas, venous [[id number]]  (Abnormal) Collected: 11/05/24 1724    Lab Status: Final result Specimen: Blood from Arm, Right Updated: 11/05/24 1737     pH, Jefry 7.296     pCO2, Jefry 49.7 mm Hg      pO2, Jefry 32.1 mm Hg      HCO3, Jefry 23.7 mmol/L      Base Excess, Jefry -3.1 mmol/L      O2 Content, Jefry 9.6 ml/dL      O2 HGB, VENOUS 55.8 %     Blood culture [[id number]] Collected: 11/05/24 1724    Lab Status: In process Specimen: Blood from Arm, Right Updated: 11/05/24 1731    Blood culture [[id number]] Collected: 11/05/24 1724    Lab Status: In process Specimen: Blood from Arm, Right Updated: 11/05/24 1731    Rapid drug screen, urine [[id number]]     Lab Status: No result Specimen: Urine     Urinalysis with microscopic [[id number]]     Lab Status: No result Specimen: Urine     HS Troponin I 2hr [[id number]]     Lab Status: No result Specimen: Blood     HS  Troponin 0hr (reflex protocol) [[id number]]  (Normal) Collected: 11/05/24 1627    Lab Status: Final result Specimen: Blood from Arm, Left Updated: 11/05/24 1656     hs TnI 0hr 12 ng/L     Basic metabolic panel [[id number]]  (Abnormal) Collected: 11/05/24 1627    Lab Status: Final result Specimen: Blood from Arm, Left Updated: 11/05/24 1652     Sodium 135 mmol/L      Potassium 3.7 mmol/L      Chloride 100 mmol/L      CO2 28 mmol/L      ANION GAP 7 mmol/L      BUN 25 mg/dL      Creatinine 2.14 mg/dL      Glucose 281 mg/dL      Calcium 8.6 mg/dL      eGFR 32 ml/min/1.73sq m     Narrative:      National Kidney Disease Foundation guidelines for Chronic Kidney Disease (CKD):     Stage 1 with normal or high GFR (GFR > 90 mL/min/1.73 square meters)    Stage 2 Mild CKD (GFR = 60-89 mL/min/1.73 square meters)    Stage 3A Moderate CKD (GFR = 45-59 mL/min/1.73 square meters)    Stage 3B Moderate CKD (GFR = 30-44 mL/min/1.73 square meters)    Stage 4 Severe CKD (GFR = 15-29 mL/min/1.73 square meters)    Stage 5 End Stage CKD (GFR <15 mL/min/1.73 square meters)  Note: GFR calculation is accurate only with a steady state creatinine    APTT [[id number]]  (Normal) Collected: 11/05/24 1627    Lab Status: Final result Specimen: Blood from Arm, Left Updated: 11/05/24 1649     PTT 25 seconds     Protime-INR [[id number]]  (Abnormal) Collected: 11/05/24 1627    Lab Status: Final result Specimen: Blood from Arm, Left Updated: 11/05/24 1649     Protime 15.3 seconds      INR 1.20    Narrative:      INR Therapeutic Range    Indication                                             INR Range      Atrial Fibrillation                                               2.0-3.0  Hypercoagulable State                                    2.0.2.3  Left Ventricular Asist Device                            2.0-3.0  Mechanical Heart Valve                                  -    Aortic(with afib, MI, embolism, HF, LA enlargement,    and/or coagulopathy)                                      2.0-3.0 (2.5-3.5)     Mitral                                                             2.5-3.5  Prosthetic/Bioprosthetic Heart Valve               2.0-3.0  Venous thromboembolism (VTE: VT, PE        2.0-3.0    CBC and Platelet [[id number]]  (Abnormal) Collected: 11/05/24 1627    Lab Status: Final result Specimen: Blood from Arm, Left Updated: 11/05/24 1633     WBC 9.14 Thousand/uL      RBC 3.90 Million/uL      Hemoglobin 11.7 g/dL      Hematocrit 34.8 %      MCV 89 fL      MCH 30.0 pg      MCHC 33.6 g/dL      RDW 12.5 %      Platelets 169 Thousands/uL      MPV 9.4 fL     Fingerstick Glucose (POCT) [[id number]]  (Abnormal) Collected: 11/05/24 1622    Lab Status: Final result Specimen: Blood Updated: 11/05/24 1623     POC Glucose 247 mg/dl             CTA stroke alert (head/neck)   Final Interpretation by Spike Slater DO (11/05 1648)      No large vessel occlusion. No stenosis. No vascular malformation.      Mild patchy airspace disease in the posterior aspect of the right upper lobe are stable. Correlate for signs of pneumonia.      Findings were directly communicated with Zak Arriaga of Neurology and Dr. Jo of the Ed via epic secure chat at 4:45 p.m.      Workstation performed: [id number]         CT stroke alert brain   Final Interpretation by Spike Slater DO (11/05 1649)      No acute intracranial abnormality.      Extensive right maxillary and sphenoid sinus mucosal thickening.      Findings were directly discussed with Zak Arriaga  of Neurology and Dr. Jo of the ED via [employer] secure chat at approximately 4:40 p.m.      Workstation performed: [id number]         X-ray chest 1 view portable    (Results Pending)       CriticalCare Time    Date/Time: 11/5/2024 5:48 PM    Performed by: Jama Jo DO  Authorized by: Jama Jo DO    Critical care provider statement:     Critical care time (minutes):  40    Critical care time was exclusive of:   Separately billable procedures and treating other patients and teaching time    Critical care was necessary to treat or prevent imminent or life-threatening deterioration of the following conditions:  Cardiac failure, circulatory failure, sepsis, shock, dehydration, CNS failure or compromise and renal failure    Critical care was time spent personally by me on the following activities:  Obtaining history from patient or surrogate, development of treatment plan with patient or surrogate, discussions with consultants, examination of patient, evaluation of patient's response to treatment, review of old charts, re-evaluation of patient's condition, ordering and review of radiographic studies, ordering and review of laboratory studies and ordering and performing treatments and interventions    I assumed direction of critical care for this patient from another provider in my specialty: no    ECG 12 Lead Documentation Only    Date/Time: 11/5/2024 5:48 PM    Performed by: Jama Jo DO  Authorized by: Jama Jo DO    Indications / Diagnosis:  Stroke workup  ECG reviewed by me, the ED Provider: yes    Patient location:  ED  Interpretation:     Interpretation: abnormal    Rate:     ECG rate:  77    ECG rate assessment: normal    Rhythm:     Rhythm: sinus rhythm    Ectopy:     Ectopy: none    QRS:     QRS axis:  Normal  Conduction:     Conduction: normal    ST segments:     ST segments:  Normal  T waves:     T waves: non-specific    Comments:      No SABRINA or hyperkalemia      ED Medication and Procedure Management   Prior to Admission Medications   Prescriptions Last Dose Informant Patient Reported? Taking?   FLUoxetine (PROzac) 20 mg capsule   No No   Sig: Take 1 capsule (20 mg total) by mouth daily   Patient not taking: Reported on 4/24/2020   Levothyroxine Sodium (Tirosint) 50 MCG CAPS   No No   Sig: Take 1 capsule (50 mcg total) by mouth every 24 hours   Patient not taking: Reported on  4/24/2020   Lidocaine Viscous HCl (XYLOCAINE) 2 % mucosal solution   No No   Sig: Apply small amount to affected area as needed.   aspirin (ECOTRIN LOW STRENGTH) 81 mg EC tablet   No No   Sig: Take 1 tablet (81 mg total) by mouth daily   Patient not taking: Reported on 4/24/2020   atorvastatin (LIPITOR) 10 mg tablet   No No   Sig: Take 1 tablet (10 mg total) by mouth daily   Patient not taking: Reported on 4/24/2020   baclofen 10 mg tablet   Yes No   Sig: Take by mouth   benztropine (COGENTIN) 0.5 mg tablet   No No   Sig: Take 1 tablet (0.5 mg total) by mouth 2 (two) times a day   Patient not taking: Reported on 4/24/2020   cyclobenzaprine (FLEXERIL) 10 mg tablet   No No   Sig: Take 1 tablet (10 mg total) by mouth 3 (three) times a day as needed for muscle spasms   Patient not taking: Reported on 3/28/2020   diphenhydrAMINE (BENADRYL) 25 mg capsule   Yes No   Sig: take 1 Capsule (25MG)  by oral mouth bid   gabapentin (NEURONTIN) 300 mg capsule   No No   Sig: Take 1 capsule (300 mg total) by mouth 3 (three) times a day   Patient not taking: Reported on 3/28/2020   hydrOXYzine HCL (ATARAX) 50 mg tablet   Yes No   Sig: Take 50 mg by mouth every 6 (six) hours   hydrochlorothiazide (HYDRODIURIL) 12.5 mg tablet   Yes No   Sig: every 24 hours   ibuprofen (MOTRIN) 600 mg tablet   No No   Sig: Take 1 tablet (600 mg total) by mouth every 6 (six) hours as needed for mild pain for up to 10 days   lisinopril (ZESTRIL) 10 mg tablet   Yes No   Sig: Take 10 mg by mouth daily   lisinopril (ZESTRIL) 10 mg tablet   No No   Sig: Take 1 tablet (10 mg total) by mouth daily   loratadine (CLARITIN) 10 mg tablet   Yes No   Sig: every 24 hours   metFORMIN (GLUCOPHAGE) 500 mg tablet   No No   Sig: Take 1 tablet (500 mg total) by mouth 2 (two) times a day with meals   Patient not taking: Reported on 4/24/2020   rOPINIRole (REQUIP) 0.25 mg tablet   Yes No   Sig: Take 0.25 mg by mouth   risperiDONE (RisperDAL) 3 mg tablet   Yes No   Sig: 3 mg  daily at bedtime   traZODone (DESYREL) 100 mg tablet   No No   Sig: Take 1 tablet (100 mg total) by mouth 2 (two) times a day      Facility-Administered Medications: None     Patient's Medications   Discharge Prescriptions    No medications on file     No discharge procedures on file.  ED SEPSIS DOCUMENTATION   Time reflects when diagnosis was documented in both MDM as applicable and the Disposition within this note       Time User Action Codes Description Comment    11/5/2024  4:26 PM Jama Jo [R29.90] Stroke-like symptom     11/5/2024  5:23 PM Jama Jo [N17.9] JARED (acute kidney injury) (HCC)     11/5/2024  5:23 PM Jama Jo [I95.9] Hypotension     11/5/2024  6:19 PM Jama Jo [R79.89] Lactate blood increased                  Jama Jo DO  11/05/24 1125

## 2024-11-05 NOTE — QUICK NOTE
Patient not seen, recommendation based on information provided by ED physician over the phone    Stroke alert called: [id number]  Neurology response immediate  LKW: last night  NIHSS 2 for R facial droop and slurred speech        CTH showed no acute abnormality. CTA: no LVO.       IVtPA: TNK Decision:no, out of window  Thrombectomy: no, no LVO    A/P   65 yo hx of prior stroke, who was stroke alerted for reported worsening slurred speech and R lower facial droop. LKN was initially reported 1 hour ago, but later noticed since last night.   NIHSS=2 for R facial droop( chronic), slurred speech (reported worsened)  Of  note, patient found to have severe hypotension with SBP 84  CTH showed no acute abnormality. CTA: no LVO.    Unclear whether patient's sympotoms truly cva related or not  -recommend to load with   -pending MRI brain wo  -hold stroke workup unless MRI brain positive for acute stroke    Other comorbidities, including hypotension, will defer to ED physician.   Discussed with ED physician Dr. Jo at time of alert

## 2024-11-06 ENCOUNTER — APPOINTMENT (INPATIENT)
Dept: RADIOLOGY | Facility: HOSPITAL | Age: 64
DRG: 207 | End: 2024-11-06
Payer: COMMERCIAL

## 2024-11-06 ENCOUNTER — APPOINTMENT (INPATIENT)
Dept: NON INVASIVE DIAGNOSTICS | Facility: HOSPITAL | Age: 64
DRG: 207 | End: 2024-11-06
Payer: COMMERCIAL

## 2024-11-06 ENCOUNTER — PATIENT OUTREACH (OUTPATIENT)
Dept: OTHER | Facility: OTHER | Age: 64
End: 2024-11-06

## 2024-11-06 PROBLEM — I95.9 HYPOTENSION: Status: ACTIVE | Noted: 2024-11-06

## 2024-11-06 PROBLEM — R29.90 STROKE-LIKE SYMPTOMS: Status: ACTIVE | Noted: 2024-11-05

## 2024-11-06 LAB
ANION GAP SERPL CALCULATED.3IONS-SCNC: 5 MMOL/L (ref 4–13)
AORTIC ROOT: 3.2 CM
AORTIC VALVE MEAN VELOCITY: 11.4 M/S
APICAL FOUR CHAMBER EJECTION FRACTION: 66 %
ASCENDING AORTA: 3 CM
AV LVOT MEAN GRADIENT: 4 MMHG
AV LVOT PEAK GRADIENT: 8 MMHG
AV MEAN GRADIENT: 6 MMHG
AV PEAK GRADIENT: 13 MMHG
AV VELOCITY RATIO: 0.8
BASOPHILS # BLD AUTO: 0.01 THOUSANDS/ÂΜL (ref 0–0.1)
BASOPHILS NFR BLD AUTO: 0 % (ref 0–1)
BSA FOR ECHO PROCEDURE: 1.7 M2
BUN SERPL-MCNC: 17 MG/DL (ref 5–25)
CALCIUM SERPL-MCNC: 8.3 MG/DL (ref 8.4–10.2)
CHLORIDE SERPL-SCNC: 106 MMOL/L (ref 96–108)
CHOLEST SERPL-MCNC: 84 MG/DL
CO2 SERPL-SCNC: 26 MMOL/L (ref 21–32)
CREAT SERPL-MCNC: 1.04 MG/DL (ref 0.6–1.3)
DOP CALC AO PEAK VEL: 1.82 M/S
DOP CALC AO VTI: 34.34 CM
DOP CALC LVOT PEAK VEL VTI: 28.97 CM
DOP CALC LVOT PEAK VEL: 1.45 M/S
E WAVE DECELERATION TIME: 292 MS
E/A RATIO: 0.78
EOSINOPHIL # BLD AUTO: 0.25 THOUSAND/ÂΜL (ref 0–0.61)
EOSINOPHIL NFR BLD AUTO: 4 % (ref 0–6)
ERYTHROCYTE [DISTWIDTH] IN BLOOD BY AUTOMATED COUNT: 12.6 % (ref 11.6–15.1)
EST. AVERAGE GLUCOSE BLD GHB EST-MCNC: 174 MG/DL
FRACTIONAL SHORTENING: 43 (ref 28–44)
GFR SERPL CREATININE-BSD FRML MDRD: 76 ML/MIN/1.73SQ M
GLUCOSE SERPL-MCNC: 151 MG/DL (ref 65–140)
HBA1C MFR BLD: 7.7 %
HCT VFR BLD AUTO: 34.8 % (ref 36.5–49.3)
HDLC SERPL-MCNC: 30 MG/DL
HGB BLD-MCNC: 11.5 G/DL (ref 12–17)
IMM GRANULOCYTES # BLD AUTO: 0.02 THOUSAND/UL (ref 0–0.2)
IMM GRANULOCYTES NFR BLD AUTO: 0 % (ref 0–2)
INTERVENTRICULAR SEPTUM IN DIASTOLE (PARASTERNAL SHORT AXIS VIEW): 0.8 CM
INTERVENTRICULAR SEPTUM: 0.8 CM (ref 0.6–1.1)
LAAS-AP2: 22.7 CM2
LAAS-AP4: 24.8 CM2
LDLC SERPL CALC-MCNC: 41 MG/DL (ref 0–100)
LEFT ATRIUM SIZE: 4.7 CM
LEFT ATRIUM VOLUME (MOD BIPLANE): 79 ML
LEFT ATRIUM VOLUME INDEX (MOD BIPLANE): 46.5 ML/M2
LEFT INTERNAL DIMENSION IN SYSTOLE: 2.7 CM (ref 2.1–4)
LEFT VENTRICULAR INTERNAL DIMENSION IN DIASTOLE: 4.7 CM (ref 3.5–6)
LEFT VENTRICULAR POSTERIOR WALL IN END DIASTOLE: 0.8 CM
LEFT VENTRICULAR STROKE VOLUME: 77 ML
LVSV (TEICH): 77 ML
LYMPHOCYTES # BLD AUTO: 1.13 THOUSANDS/ÂΜL (ref 0.6–4.47)
LYMPHOCYTES NFR BLD AUTO: 20 % (ref 14–44)
MAGNESIUM SERPL-MCNC: 1.6 MG/DL (ref 1.9–2.7)
MCH RBC QN AUTO: 29.6 PG (ref 26.8–34.3)
MCHC RBC AUTO-ENTMCNC: 33 G/DL (ref 31.4–37.4)
MCV RBC AUTO: 90 FL (ref 82–98)
MONOCYTES # BLD AUTO: 0.43 THOUSAND/ÂΜL (ref 0.17–1.22)
MONOCYTES NFR BLD AUTO: 8 % (ref 4–12)
MV E'TISSUE VEL-SEP: 6 CM/S
MV PEAK A VEL: 1.08 M/S
MV PEAK E VEL: 84 CM/S
MV STENOSIS PRESSURE HALF TIME: 85 MS
MV VALVE AREA P 1/2 METHOD: 2.59
NEUTROPHILS # BLD AUTO: 3.8 THOUSANDS/ÂΜL (ref 1.85–7.62)
NEUTS SEG NFR BLD AUTO: 68 % (ref 43–75)
NRBC BLD AUTO-RTO: 0 /100 WBCS
PLATELET # BLD AUTO: 151 THOUSANDS/UL (ref 149–390)
PMV BLD AUTO: 9.6 FL (ref 8.9–12.7)
POTASSIUM SERPL-SCNC: 3.5 MMOL/L (ref 3.5–5.3)
RBC # BLD AUTO: 3.88 MILLION/UL (ref 3.88–5.62)
RIGHT ATRIAL 2D VOLUME: 50 ML
RIGHT ATRIUM AREA SYSTOLE A4C: 18.8 CM2
RIGHT VENTRICLE ID DIMENSION: 3.6 CM
SL CV LEFT ATRIUM LENGTH A2C: 5.7 CM
SL CV LV EF: 65
SL CV PED ECHO LEFT VENTRICLE DIASTOLIC VOLUME (MOD BIPLANE) 2D: 105 ML
SL CV PED ECHO LEFT VENTRICLE SYSTOLIC VOLUME (MOD BIPLANE) 2D: 28 ML
SODIUM SERPL-SCNC: 137 MMOL/L (ref 135–147)
TR MAX PG: 21 MMHG
TR PEAK VELOCITY: 2.3 M/S
TRICUSPID ANNULAR PLANE SYSTOLIC EXCURSION: 2.7 CM
TRICUSPID VALVE PEAK REGURGITATION VELOCITY: 2.28 M/S
TRIGL SERPL-MCNC: 66 MG/DL
WBC # BLD AUTO: 5.64 THOUSAND/UL (ref 4.31–10.16)

## 2024-11-06 PROCEDURE — 83735 ASSAY OF MAGNESIUM: CPT | Performed by: HOSPITALIST

## 2024-11-06 PROCEDURE — 83036 HEMOGLOBIN GLYCOSYLATED A1C: CPT | Performed by: HOSPITALIST

## 2024-11-06 PROCEDURE — 80061 LIPID PANEL: CPT | Performed by: HOSPITALIST

## 2024-11-06 PROCEDURE — 97166 OT EVAL MOD COMPLEX 45 MIN: CPT

## 2024-11-06 PROCEDURE — 99232 SBSQ HOSP IP/OBS MODERATE 35: CPT | Performed by: INTERNAL MEDICINE

## 2024-11-06 PROCEDURE — 85025 COMPLETE CBC W/AUTO DIFF WBC: CPT | Performed by: HOSPITALIST

## 2024-11-06 PROCEDURE — 97162 PT EVAL MOD COMPLEX 30 MIN: CPT

## 2024-11-06 PROCEDURE — 74022 RADEX COMPL AQT ABD SERIES: CPT

## 2024-11-06 PROCEDURE — 93306 TTE W/DOPPLER COMPLETE: CPT | Performed by: INTERNAL MEDICINE

## 2024-11-06 PROCEDURE — 80048 BASIC METABOLIC PNL TOTAL CA: CPT | Performed by: HOSPITALIST

## 2024-11-06 PROCEDURE — 93306 TTE W/DOPPLER COMPLETE: CPT

## 2024-11-06 PROCEDURE — 92610 EVALUATE SWALLOWING FUNCTION: CPT

## 2024-11-06 PROCEDURE — 99291 CRITICAL CARE FIRST HOUR: CPT | Performed by: PSYCHIATRY & NEUROLOGY

## 2024-11-06 RX ORDER — ASPIRIN 81 MG/1
81 TABLET ORAL DAILY
Status: DISCONTINUED | OUTPATIENT
Start: 2024-11-07 | End: 2024-11-07 | Stop reason: HOSPADM

## 2024-11-06 RX ORDER — ASPIRIN 81 MG/1
81 TABLET ORAL DAILY
Status: DISCONTINUED | OUTPATIENT
Start: 2024-11-06 | End: 2024-11-06

## 2024-11-06 RX ADMIN — ROPINIROLE HYDROCHLORIDE 0.25 MG: 0.25 TABLET, FILM COATED ORAL at 21:13

## 2024-11-06 RX ADMIN — HYDROXYZINE HYDROCHLORIDE 50 MG: 25 TABLET ORAL at 21:14

## 2024-11-06 RX ADMIN — HYDROXYZINE HYDROCHLORIDE 50 MG: 25 TABLET ORAL at 16:21

## 2024-11-06 RX ADMIN — TRAZODONE HYDROCHLORIDE 100 MG: 100 TABLET ORAL at 21:13

## 2024-11-06 RX ADMIN — HEPARIN SODIUM 5000 UNITS: 5000 INJECTION INTRAVENOUS; SUBCUTANEOUS at 16:21

## 2024-11-06 RX ADMIN — RISPERIDONE 3 MG: 1 TABLET, FILM COATED ORAL at 21:13

## 2024-11-06 RX ADMIN — HEPARIN SODIUM 5000 UNITS: 5000 INJECTION INTRAVENOUS; SUBCUTANEOUS at 05:40

## 2024-11-06 RX ADMIN — BENZTROPINE MESYLATE 0.5 MG: 1 TABLET ORAL at 09:09

## 2024-11-06 RX ADMIN — HYDROXYZINE HYDROCHLORIDE 50 MG: 25 TABLET ORAL at 09:09

## 2024-11-06 RX ADMIN — TRAZODONE HYDROCHLORIDE 100 MG: 100 TABLET ORAL at 09:09

## 2024-11-06 RX ADMIN — BENZTROPINE MESYLATE 0.5 MG: 1 TABLET ORAL at 17:08

## 2024-11-06 RX ADMIN — SODIUM CHLORIDE, SODIUM LACTATE, POTASSIUM CHLORIDE, AND CALCIUM CHLORIDE 125 ML/HR: .6; .31; .03; .02 INJECTION, SOLUTION INTRAVENOUS at 23:45

## 2024-11-06 RX ADMIN — LEVOTHYROXINE SODIUM 50 MCG: 50 TABLET ORAL at 05:40

## 2024-11-06 RX ADMIN — ATORVASTATIN CALCIUM 40 MG: 40 TABLET, FILM COATED ORAL at 17:08

## 2024-11-06 RX ADMIN — HEPARIN SODIUM 5000 UNITS: 5000 INJECTION INTRAVENOUS; SUBCUTANEOUS at 21:13

## 2024-11-06 RX ADMIN — SODIUM CHLORIDE, SODIUM LACTATE, POTASSIUM CHLORIDE, AND CALCIUM CHLORIDE 125 ML/HR: .6; .31; .03; .02 INJECTION, SOLUTION INTRAVENOUS at 03:59

## 2024-11-06 RX ADMIN — FLUOXETINE HYDROCHLORIDE 20 MG: 20 CAPSULE ORAL at 09:09

## 2024-11-06 RX ADMIN — ASPIRIN 325 MG ORAL TABLET 325 MG: 325 PILL ORAL at 09:09

## 2024-11-06 NOTE — CONSULTS
Consultation - Neurology   Name: Amadeo Schultz 64 y.o. male I MRN: [id number]  Unit/Bed#: E4 -01 I Date of Admission: 11/5/2024   Date of Service: 11/6/2024 I Hospital Day: 1   Inpatient consult to Neurology  Consult performed by: Yamile Payne PA-C  Consult ordered by: Zion Marley DO      Consult to Neurology  Consult performed by: Yamile Payne PA-C  Consult ordered by: Jama Jo DO      Physician Requesting Evaluation: Onur Robertson MD   Reason for Evaluation / Principal Problem: Stroke like symptoms    Assessment & Plan  Stroke-like symptoms  64 y.o. homeless male with prior stroke, CAD s/p MI, HTN, HLD, DM2, lung cancer, chronic pain and prior tobacco use who presented to the ED on 11/5/24 as a stroke alert due to acting funny, slurred speech and right facial asymmetry that appeared different to his friend. NIHSS 2. LKW was the night prior.  Patient hypotensive with systolics in the 80s in the ED.  Not a TNK candidate as patient was out of the window.    Work-up:  CTH:   No acute intracranial abnormality.  Extensive right maxillary and sphenoid sinus mucosal thickening.  CTA H/N:  No large vessel occlusion. No stenosis. No vascular malformation.  Mild patchy airspace disease in the posterior aspect of the right upper lobe are stable. Correlate for signs of pneumonia.  LDL 41  A1C 7.7  UDS negative  Echo: EF 65%. Mildly dilated LA. Normal sized RA.     On neuroexam, patient reports he is near baseline aside from feeling tired.  He, at first glance, appears to have some lower facial asymmetry however this is due to volitional contortion of his lower face.  When asked to smile this appears to resolved.  Patient also intermittently closes his left eye which he states has been going on for a long period of time.  No other focal deficits appreciated on exam.  Etiology of deficits unclear with differential including decreased perfusion in the setting of  hypotension/stroke/TIA/recrudescence/toxic metabolic etiology. Proceed with stroke pathway as detailed below.    Plan:  Acute ischemic stroke protocol  Aspirin 325mg load on 11/6AM  Continue Aspirin 81mg daily (stated as a home med, but patient noncompliant)  Atorvastatin 40mg at this time, if MRI brain negative can return to home dose of 10mg qhs  MRI brain without contrast   Telemetry  Secondary stroke risk factor modification  Permissive hypertension with max of /110   Goal of normothermia and euglycemia   PT/OT/ST  Stroke Education  Frequent neurological checks  Stat CT head with worsening in neuro exam  Notify neurology with any changes in exam   Metabolic/infectious workup as per primary team  Type 2 diabetes mellitus without complication, without long-term current use of insulin ([facility])  Lab Results   Component Value Date    HGBA1C 6.1 (H) 11/08/2023     Recent Labs     11/05/24  1622   POCGLU 247*   Blood Sugar Average: Last 72 hrs:  (P) 247  Goal of euglycemia  Management as per primary team  Hypotension  BP as low as 77/45 since arrival.   S/p IVF  Recent /59  Management as per primary team    Recommendations for outpatient neurological follow up have yet to be determined.    History of Present Illness   Amadeo Schultz is a 64 y.o. homeless male with prior stroke, CAD s/p MI, HTN, HLD, DM2, lung cancer, chronic pain and prior tobacco use who presented to the ED on 11/5/24 as a stroke alert with worsening dysarthria after his friend reportedly noted that he was acting differently and had a right facial droop. They called the street nurse who also was concerned and thus EMS was called. NIHSS 2. LKW was the night prior. In the ED patient found to have severe hypotension with systolics in the 80s. Not a TNK candidate as patient was out of the window.  CTH/CTA H/N negative for acute intracranial abnormality, LVO or flow-limiting stenosis.  Patient admitted for stroke pathway.     On my exam this  morning, patient states he is feeling much better, though notes he is still not back to normal because he feels more tired than usual. Patient explained that yesterday he was not acting normally which he does recall and his friend noted facial asymmetry. When asked about his history of stroke, patient is somewhat vague stating that he has had slight strokes in the past when he was in state custodial as well as a left eye stroke earlier this year.  Patient denies drug or alcohol use currently but does admit to a drug history including an overdose greater than 10 years ago.  He reports that the last time he drank alcohol was prior to his time in state custodial which was from 3091-2915.    Review of Systems   Constitutional:  Positive for fatigue.        Objective :  Temp:  [97.9 °F (36.6 °C)-98.6 °F (37 °C)] 97.9 °F (36.6 °C)  HR:  [61-95] 61  BP: ()/(41-77) 102/59  Resp:  [16-21] 18  SpO2:  [84 %-100 %] 97 %  O2 Device: None (Room air)    Physical Exam  Constitutional:       General: He is awake. He is not in acute distress.     Appearance: He is not ill-appearing or toxic-appearing.   HENT:      Head: Normocephalic and atraumatic.      Mouth/Throat:      Comments: Poor dentition  Pulmonary:      Effort: Pulmonary effort is normal. No respiratory distress.   Neurological:      Mental Status: He is alert.      Motor: Motor strength is normal.      Neurological Exam  Mental Status  Awake and alert.  Oriented to person, hospital, month and year.  Incorrectly stated the name of the hospital.  Is aware of the 2 presidential candidates but not who won the recent election..    Cranial Nerves  Conjugate gaze though occasionally with EOMs there seems to be a slight disconjugate gaze which resolves quickly however patient does report he has a history of a lazy eye that his dad told him about when he was young.  He reports that when he is looking out of his left eye only it is blurry because of the prior stroke and when he is  looking out of his right eye it is blurry because of cataracts.  EOMs otherwise intact.  Visual fields intact.  With regards to facial symmetry, patient frequently can towards his mouth where it appears that he has a right facial asymmetry however when asked to smile this appears to resolved.  Additionally patient frequently closes his left eye and he states that this is just how he is..    Motor  Normal muscle bulk throughout. Normal muscle tone. Strength is 5/5 throughout all four extremities.  No pronator drift.    Sensory  Light touch is normal in upper and lower extremities. Decreased temperature distally in BLEs, otherwise intact.     Reflexes  DTRs 2+ throughout.        Lab Results: I have personally reviewed pertinent reports.    Recent Labs     11/06/24  0536   WBC 5.64   HGB 11.5*   HCT 34.8*      SODIUM 137   K 3.5      CO2 26   BUN 17   CREATININE 1.04   GLUC 151*   MG 1.6*     VTE Prophylaxis: Heparin

## 2024-11-06 NOTE — PROGRESS NOTES
Tuesday November 5, 2024    15:25  -This Community Health Resource Nurse (CHRN) was asked by Romina, client's friend, to assess the client at [facility] ([facility]) during outreach hours. Client is familiar to this CHRN.     Client was sitting in a chair, eyes squinting,exhibited slurring speech, and noted left facial drooping.      CHRN called 911.  EMS arrived. CHRN transferred report and care to paramedics.

## 2024-11-06 NOTE — UTILIZATION REVIEW
Initial Clinical Review    Admission: Date/Time/Statement:   Admission Orders (From admission, onward)       Ordered        11/05/24 1816  INPATIENT ADMISSION  Once                          Orders Placed This Encounter   Procedures    INPATIENT ADMISSION     Standing Status:   Standing     Number of Occurrences:   1     Order Specific Question:   Level of Care     Answer:   Med Surg [16]     Order Specific Question:   Estimated length of stay     Answer:   More than 2 Midnights     Order Specific Question:   Certification     Answer:   I certify that inpatient services are medically necessary for this patient for a duration of greater than two midnights. See H&P and MD Progress Notes for additional information about the patient's course of treatment.     ED Arrival Information       Expected   -    Arrival   11/5/2024 16:15    Acuity   Emergent              Means of arrival   Ambulance    Escorted by   [facility] ([facility])    Service   Hospitalist    Admission type   Emergency              Arrival complaint   Slurred speach             Chief Complaint   Patient presents with    CVA/TIA-like Symptoms     Pt arrives via EMS. Pt from [facility]. Starting an hr ago he had left sided facial droop and slurred speech. Pt had 70/30 pressures. Pt started on levo in EMS. Pt hx of strokes before.        Initial Presentation: 64 y.o. male presents to the ED via EMS - pt is homeless - with c/o L facial droop, acting differently.  EMS has BP 70/30 treated with Levophed.   PMH: psych disorder, NIDDM, previous stroke.  In the ED pt is hypotensive but improving, good sat, no c/o pain.  Labs - Elevated lactic acid, glucose 281, creat, glucosuria, acidotic gasses.  Imaging - no acute disease, extensive right maxillary and sphenoid sinus mucosal thickening, mildly patchy airspace disease RUL, poss PNA.  Treated with IV fluids 2 L, IV antibiotics.  On exam normal breath sounds, no sensory or motor deficits, alert, no facial  droop.  Admitted to INPATIENT status with TIA - , stroke pathway, ASA, MRI Brain, neuro consult, SSI cover, continue psych meds.      11/5 Neuro Quick Note - NIHSS 2 R facial droop (chronic), worsened slurred speech, no acute changes on imaging, hypotensive.  No TNK.  Will load ASA, MRI Brain, stroke alert.     Date: 11/6   Day 2:   TIA,  hypotension - some hypotension this AM.  Was seen by SLP has no speech difficulty. Remains on IV fluids.  Afebrile. On room air, MRI Brain pending.  Creat WNL, low calcium, Mag.  Obstruction series pending.  Echo completed.      11/6 Neuro Consult -         ED Treatment-Medication Administration from 11/05/2024 1615 to 11/05/2024 2029         Date/Time Order Dose Route Action     11/05/2024 1641 lactated ringers bolus 1,000 mL 1,000 mL Intravenous New Bag     11/05/2024 1733 norepinephrine (LEVOPHED) 1 mg/mL injection 4 mg 0 mg Does not apply Given to EMS     11/05/2024 1634 iohexol (OMNIPAQUE) 350 MG/ML injection (SINGLE-DOSE) 85 mL 85 mL Intravenous Given     11/05/2024 1726 cefepime (MAXIPIME) 2 g/50 mL dextrose IVPB 2,000 mg Intravenous New Bag     11/05/2024 1805 vancomycin (VANCOCIN) 1500 mg in sodium chloride 0.9% 250 mL IVPB 1,500 mg Intravenous New Bag     11/05/2024 1715 lactated ringers bolus 1,000 mL -- Intravenous Restarted     11/05/2024 1813 lactated ringers infusion 125 mL/hr Intravenous New Bag            Scheduled Medications:  [START ON 11/7/2024] aspirin, 81 mg, Oral, Daily  atorvastatin, 40 mg, Oral, QPM  benztropine, 0.5 mg, Oral, BID  FLUoxetine, 20 mg, Oral, Daily  heparin (porcine), 5,000 Units, Subcutaneous, Q8H RU  hydrOXYzine HCL, 50 mg, Oral, Q6H  lactated ringers, 1,000 mL, Intravenous, Once  levothyroxine, 50 mcg, Oral, Early Morning  risperiDONE, 3 mg, Oral, HS  rOPINIRole, 0.25 mg, Oral, HS  traZODone, 100 mg, Oral, Q12H RU      Continuous IV Infusions:  lactated ringers, 125 mL/hr, Intravenous, Continuous  Levophed drip - not given.       PRN  Meds:     ED Triage Vitals   Temperature Pulse Respirations Blood Pressure SpO2 Pain Score   11/05/24 1704 11/05/24 1619 11/05/24 1619 11/05/24 1617 11/05/24 1619 11/05/24 2033   98.6 °F (37 °C) 95 18 (!) 82/49 98 % No Pain     Weight (last 2 days)       Date/Time Weight    11/06/24 0830 61.7 (136)    11/05/24 2040 61.8 (136.24)    11/05/24 1620 61.8 (136.24)            Vital Signs (last 3 days)       Date/Time Temp Pulse Resp BP MAP (mmHg) SpO2 O2 Device Patient Position - Orthostatic VS Dariela Coma Scale Score Pain    11/06/24 0830 -- 61 -- 102/59 -- -- -- -- -- --    11/06/24 0740 97.9 °F (36.6 °C) 61 18 102/59 -- 97 % None (Room air) Lying 15 No Pain    11/06/24 0540 -- 69 18 108/56 76 99 % -- Lying 15 --    11/06/24 0340 -- 63 18 91/51 64 95 % -- Lying 15 --    11/06/24 0140 -- 65 16 91/55 68 95 % -- Lying 15 --    11/05/24 2340 98.1 °F (36.7 °C) 65 16 83/48 60 94 % None (Room air) Lying 15 --    11/05/24 2240 98.3 °F (36.8 °C) 65 16 92/53 68 96 % None (Room air) Lying 15 No Pain    11/05/24 2140 98 °F (36.7 °C) 63 16 95/55 70 96 % None (Room air) Lying 15 --    11/05/24 2042 -- -- -- -- -- -- -- -- 15 No Pain    11/05/24 2040 98.4 °F (36.9 °C) 68 16 100/62 67 100 % None (Room air) Lying -- --    11/05/24 2033 -- -- -- -- -- -- -- -- -- No Pain    11/05/24 2019 -- 66 16 109/61 -- 96 % None (Room air) -- 15 --    11/05/24 2000 -- 68 16 96/54 71 97 % None (Room air) Lying -- --    11/05/24 1920 -- 70 18 97/55 -- -- None (Room air) Lying 15 --    11/05/24 1900 -- 66 18 96/55 72 99 % None (Room air) Lying -- --    11/05/24 1850 -- -- -- -- -- -- -- -- 15 --    11/05/24 1845 -- 68 19 101/54 74 98 % None (Room air) Lying -- --    11/05/24 1820 -- 70 18 91/54 68 99 % None (Room air) Lying 15 --    11/05/24 1813 -- -- -- 106/64 89 -- -- -- -- --    11/05/24 1750 -- 74 18 92/55 69 97 % None (Room air) Lying 14 --    11/05/24 1730 -- 76 18 107/56 76 98 % None (Room air) Lying -- --    11/05/24 1720 -- 75 18 105/77 -- 99  % None (Room air) Lying -- --    11/05/24 1705 -- 75 18 102/55 -- 99 % None (Room air) -- 14 --    11/05/24 1704 98.6 °F (37 °C) -- -- -- -- -- -- -- -- --    11/05/24 1700 -- 74 21 -- -- -- -- -- -- --    11/05/24 16:59:01 -- -- -- 102/51 -- -- -- -- -- --    11/05/24 1659 -- -- -- -- -- 92 % -- -- -- --    11/05/24 1657 -- -- -- -- -- 84 % -- -- -- --    11/05/24 1655 -- 76 21 -- -- -- -- -- -- --    11/05/24 16:52:14 -- -- -- 77/45 -- -- -- -- -- --    11/05/24 1650 -- 75 16 77/45 -- 98 % None (Room air) Lying 14 --    11/05/24 1649 -- -- -- -- -- 98 % -- -- -- --    11/05/24 16:46:52 -- -- -- 84/50 -- -- -- -- -- --    11/05/24 1635 -- 75 18 78/41 -- 99 % None (Room air) Lying 14 --    11/05/24 1621 -- -- -- -- -- -- -- -- 14 --    11/05/24 1620 -- 84 -- -- -- 100 % -- -- 14 --    11/05/24 1619 -- 95 18 82/49 -- 98 % None (Room air) Lying -- --    11/05/24 16:17:18 -- -- -- 82/49 -- -- -- -- -- --              Pertinent Labs/Diagnostic Test Results:   Radiology:    11/6 OBSTRUCTION SERIES - P    X-ray chest 1 view portable   Final Interpretation by Pablo Marquez MD (11/06 0914)      No acute cardiopulmonary disease.            Resident: ANITA Caldwell I, the attending radiologist, have reviewed the images and agree with the final report above.      Workstation performed: [id number]         CTA stroke alert (head/neck)   Final Interpretation by Spike Slater DO (11/05 1648)      No large vessel occlusion. No stenosis. No vascular malformation.      Mild patchy airspace disease in the posterior aspect of the right upper lobe are stable. Correlate for signs of pneumonia.      Findings were directly communicated with Zak Arriaga of Neurology and Dr. Jo of the Ed via epic secure chat at 4:45 p.m.      Workstation performed: [id number]         CT stroke alert brain   Final Interpretation by Spike Slater DO (11/05 1649)      No acute intracranial abnormality.      Extensive right maxillary  and sphenoid sinus mucosal thickening.      Findings were directly discussed with Zak Arriaga  of Neurology and Dr. Jo of the ED via [email or web address] secure chat at approximately 4:40 p.m.      Workstation performed: [id number]         MRI Inpatient Order    (Results Pending)        Cardiology:  Echo complete w/ contrast if indicated     Left Ventricle: Left ventricular cavity size is normal. Wall thickness is normal. The left ventricular ejection fraction is 65%. Systolic function is normal. Wall motion is normal. Diastolic function is mildly abnormal, consistent with grade I (abnormal) relaxation.    Left Atrium: The atrium is mildly dilated.    Atrial Septum: No patent foramen ovale detected, confirmed at rest using agitated saline contrast, confirmed by provocation with cough, using agitated saline contrast.    Tricuspid Valve: There is mild regurgitation.      ECG 12 lead   Final Result by Jenna Peña MD (11/05 2154)   Age and gender specific ECG analysis    Sinus rhythm with sinus arrhythmia   Normal ECG   When compared with ECG of 05-Nov-2024 16:49,   T wave inversion no longer evident in Inferior leads   Confirmed by Jenna Peña ([id number]) on 11/5/2024 9:54:40 PM        GI:  No orders to display           Results from last 7 days   Lab Units 11/06/24 0536 11/05/24 2009 11/05/24  1627   WBC Thousand/uL 5.64  --  9.14   HEMOGLOBIN g/dL 11.5*  --  11.7*   HEMATOCRIT % 34.8*  --  34.8*   PLATELETS Thousands/uL 151 160 169   TOTAL NEUT ABS Thousands/µL 3.80  --   --          Results from last 7 days   Lab Units 11/06/24 0536 11/05/24  1627   SODIUM mmol/L 137 135   POTASSIUM mmol/L 3.5 3.7   CHLORIDE mmol/L 106 100   CO2 mmol/L 26 28   ANION GAP mmol/L 5 7   BUN mg/dL 17 25   CREATININE mg/dL 1.04 2.14*   EGFR ml/min/1.73sq m 76 32   CALCIUM mg/dL 8.3* 8.6   MAGNESIUM mg/dL 1.6*  --          Results from last 7 days   Lab Units 11/05/24  1622   POC GLUCOSE mg/dl 247*     Results from last 7 days   Lab Units  11/06/24  0536 11/05/24  1627   GLUCOSE RANDOM mg/dL 151* 281*         Results from last 7 days   Lab Units 11/06/24  0536   HEMOGLOBIN A1C % 7.7*   EAG mg/dl 174          Results from last 7 days   Lab Units 11/05/24  1724   PH SANDRA  7.296*   PCO2 SANDRA mm Hg 49.7   PO2 SANDRA mm Hg 32.1*   HCO3 SANDRA mmol/L 23.7*   BASE EXC SANDRA mmol/L -3.1   O2 CONTENT SANDRA ml/dL 9.6   O2 HGB, VENOUS % 55.8*             Results from last 7 days   Lab Units 11/05/24 2009 11/05/24  1840 11/05/24  1627   HS TNI 0HR ng/L  --   --  12   HS TNI 2HR ng/L  --  14  --    HSTNI D2 ng/L  --  2  --    HS TNI 4HR ng/L 14  --   --    HSTNI D4 ng/L 2  --   --          Results from last 7 days   Lab Units 11/05/24  1627   PROTIME seconds 15.3*   INR  1.20*   PTT seconds 25       Results from last 7 days   Lab Units 11/05/24 2009 11/05/24  1724   LACTIC ACID mmol/L 0.8 2.9*       Results from last 7 days   Lab Units 11/05/24  1835   CLARITY UA  Clear   COLOR UA  Light Yellow   SPEC GRAV UA  1.025   PH UA  5.5   GLUCOSE UA mg/dl 500 (1/2%)*   KETONES UA mg/dl Negative   BLOOD UA  Negative   PROTEIN UA mg/dl Negative   NITRITE UA  Negative   BILIRUBIN UA  Negative   UROBILINOGEN UA (BE) mg/dl <2.0   LEUKOCYTES UA  Negative   WBC UA /hpf 1-2   RBC UA /hpf None Seen   BACTERIA UA /hpf Occasional   EPITHELIAL CELLS WET PREP /hpf Occasional   MUCUS THREADS  Occasional*             Results from last 7 days   Lab Units 11/05/24  1835   AMPH/METH  Negative   BARBITURATE UR  Negative   BENZODIAZEPINE UR  Negative   COCAINE UR  Negative   METHADONE URINE  Negative   OPIATE UR  Negative   PCP UR  Negative   THC UR  Negative           Results from last 7 days   Lab Units 11/05/24  1724   BLOOD CULTURE  Received in Microbiology Lab. Culture in Progress.  Received in Microbiology Lab. Culture in Progress.         Past Medical History:   Diagnosis Date    Arthritis     Chronic pain     CVA (cerebral vascular accident) ([facility]) 2011    Diabetes mellitus ([facility])      Hyperlipidemia     Hypertension     Malignant neoplasm of lung (HCC) 1985    Last Assessed:  4/7/15    Myocardial infarction (HCC) 2011    Last Assessed:  9/22/16    Psychiatric disorder      Present on Admission:   Type 2 diabetes mellitus without complication, without long-term current use of insulin (HCC)      Admitting Diagnosis: Slurred speech [R47.81]  Hypotension [I95.9]  Lactate blood increased [R79.89]  JARED (acute kidney injury) (HCC) [N17.9]  Stroke-like symptom [R29.90]  Age/Sex: 64 y.o. male    Network Utilization Review Department  ATTENTION: Please call with any questions or concerns to [phone] and carefully listen to the prompts so that you are directed to the right person. All voicemails are confidential.   For Discharge needs, contact Care Management DC Support Team at [phone] opt. [phone]  Send all requests for admission clinical reviews, approved or denied determinations and any other requests to dedicated fax number below belonging to the campus where the patient is receiving treatment. List of dedicated fax numbers for the Facilities:  FACILITY NAME UR FAX NUMBER   ADMISSION DENIALS (Administrative/Medical Necessity) [phone]   DISCHARGE SUPPORT TEAM (NETWORK) [phone]   PARENT CHILD HEALTH (Maternity/NICU/Pediatrics) [phone]   [facility] [phone]   [facility] [phone]   [facility] [phone]   [facility] [phone]   [facility] [phone]   [facility] [phone]   [facility] [phone]   [facility] [phone]   [facility] [phone]   [facility] [phone]   [facility] [phone]   [facility] [phone]

## 2024-11-06 NOTE — SPEECH THERAPY NOTE
Speech Language/Pathology  Speech/Language Pathology  Assessment    Patient Name: Amadeo Schultz  Today's Date: 11/6/2024     Problem List  Principal Problem:    TIA (transient ischemic attack)  Active Problems:    Type 2 diabetes mellitus without complication, without long-term current use of insulin (HCC)    Psychiatric disorder    Past Medical History  Past Medical History:   Diagnosis Date    Arthritis     Chronic pain     CVA (cerebral vascular accident) (HCC) 2011    Diabetes mellitus (HCC)     Hyperlipidemia     Hypertension     Malignant neoplasm of lung (HCC) 1985    Last Assessed:  4/7/15    Myocardial infarction (HCC) 2011    Last Assessed:  9/22/16    Psychiatric disorder      Past Surgical History  Past Surgical History:   Procedure Laterality Date    CHOLECYSTECTOMY      ELBOW SURGERY Right     ELBOW SURGERY Right     GALLBLADDER SURGERY            Bedside Swallow Evaluation:    Summary:  Pt presented w/ clear speech though he stated it seems distorted. Pt has ~ 3 teeth on the left side of his mouth that are in very poor condition. Tolerated meds w/ water, multiple sips of thin liquid, puree, and had cookies wfl. Chews on his left, takes his time. Denied any previous difficulty. No lingual deviation. ? Mild right labial droop at rest. Symmetric w/ retraction. Oral/pharyngeal stages WNL.     Recommendations:  Diet:regular  Liquid:thin  Meds: whole w/ thin as tolerated  Supervision: prn  Positioning:Upright  Pt to take PO/Meds only when fully alert and upright.   Oral care  Aspiration precautions  Reflux precautions  Eval only, No f/u tx indicated.     Consider consult w/:  Rehab  Neurology  Nutrition    H&P/Admit info/ pertinent provider notes: (PMH noted above)  CVA/TIA-like Symptoms (Pt arrives via EMS. Pt from [facility]. Starting an hr ago he had left sided facial droop and slurred speech. Pt had 70/30 pressures. Pt started on levo in EMS. Pt hx of strokes before. )  History of Present Illness  Amadeo BRYANT  "Antoine is a 64 y.o. male with a PMH of unclear psychiatric disorder who presents with left facial droop.  His friend noticed that.  Patient is homeless.  She notes he was acting differently noted some left facial droop.  They called the street nurse to evaluate him.  They also felt there was facial droop and some to the ER.  During my examination there is no facial droop.  No sided weakness.  No numbness.  Patient's lycopene ambulating in particular.  No fever or chills..      Special Studies:  CTA stroke alert head and neck  11/5/24:  No large vessel occlusion. No stenosis. No vascular malformation.  Mild patchy airspace disease in the posterior aspect of the right upper lobe are stable. Correlate for signs of pneumonia.  CT stroke 11/5/24:  No acute intracranial abnormality.  Extensive right maxillary and sphenoid sinus mucosal thickening.  Cxr pending  NAD  US pending    Procalcitonin<=0.25ng/ml    WBC  4.31-10.16 Thousand/uL   5.64  11/6     Nitrites, UA  Neg 11/5   Leukocytes, UA   Neg 11/5     Previous MBS:  None in epic    Patient's goal: hungry    Did the pt report pain? no  If yes, was nursing notified/was it addressed? N/a    Reason for consult:  R/o aspiration  Determine safest and least restrictive diet  Failed nursing dysphagia assessment  Change in mental status  New neuro event  Stroke protocol      Precautions:  Fall    Food Allergies:  nkfa   Current Diet:  No diet order   Premorbid diet:  \"anything\"/junk food per friend visiting   O2 requirement:  RA   Social/Prior living  Lives alone ? Homeless, need clarification   Voice/Speech:  Wnl,. Clear. No paraphasias or word-finding deficits noted.    Follows commands:  basic   Cognitive status:  alert     Oral [facility] exam:  Dentition: ? 3 teeth  Lips (VII): ? Min R droop at rest. Retraction WNL  Tongue (XII):+  Mandible (V):+  Secretion management:+    Esophageal stage:  No s/s reported    Results d/w:  Pt, nursing,physician           "

## 2024-11-06 NOTE — OCCUPATIONAL THERAPY NOTE
Occupational Therapy Evaluation     Patient Name: Amadeo Schultz  Today's Date: 11/6/2024  Problem List  Principal Problem:    Stroke-like symptoms  Active Problems:    Type 2 diabetes mellitus without complication, without long-term current use of insulin (HCC)    Psychiatric disorder    Hypotension    Past Medical History  Past Medical History:   Diagnosis Date    Arthritis     Chronic pain     CVA (cerebral vascular accident) (HCC) 2011    Diabetes mellitus (HCC)     Hyperlipidemia     Hypertension     Malignant neoplasm of lung (HCC) 1985    Last Assessed:  4/7/15    Myocardial infarction (HCC) 2011    Last Assessed:  9/22/16    Psychiatric disorder      Past Surgical History  Past Surgical History:   Procedure Laterality Date    CHOLECYSTECTOMY      ELBOW SURGERY Right     ELBOW SURGERY Right     GALLBLADDER SURGERY             11/06/24 1014   OT Last Visit   OT Visit Date 11/06/24   Note Type   Note type Evaluation   Pain Assessment   Pain Assessment Tool FLACC   Pain Rating: FLACC (Rest) - Face 0   Pain Rating: FLACC (Rest) - Legs 0   Pain Rating: FLACC (Rest) - Activity 0   Pain Rating: FLACC (Rest) - Cry 0   Pain Rating: FLACC (Rest) - Consolability 0   Score: FLACC (Rest) 0   Pain Rating: FLACC (Activity) - Face 0   Pain Rating: FLACC (Activity) - Legs 0   Pain Rating: FLACC (Activity) - Activity 0   Pain Rating: FLACC (Activity) - Cry 0   Pain Rating: FLACC (Activity) - Consolability 0   Score: FLACC (Activity) 0   Restrictions/Precautions   Weight Bearing Precautions Per Order No   Other Precautions Chair Alarm;Bed Alarm;Multiple lines;Telemetry   Home Living   Type of Home Homeless   Prior Function   Level of [place] Independent with ADLs;Independent with functional mobility   Lives With Alone   IADLs   (able to puchase food/meals from the store)   Falls in the last 6 months 1 to 4   Vocational Unemployed   Comments At baseline, pt was I w/ ADLs, IADLs, and functional transfers/mobility w/o use  of AD. (-) . (+) fall PTA.   Lifestyle   Autonomy At baseline, pt was I w/ ADLs, IADLs, and functional transfers/mobility w/o use of AD. (-) . (+) fall PTA.   Reciprocal Relationships Friend   Service to Others Unemployed   ADL   Where Assessed Edge of bed   Eating Assistance 7  Independent   Grooming Assistance 6  Modified Independent   UB Bathing Assistance 6  Modified Independent   LB Bathing Assistance 5  Supervision/Setup   UB Dressing Assistance 6  Modified independent   LB Dressing Assistance 5  Supervision/Setup   Toileting Assistance  5  Supervision/Setup   Bed Mobility   Supine to Sit 6  Modified independent   Additional items HOB elevated   Sit to Supine Unable to assess   Additional Comments Pt seated OOB in chair with chair alarm activated at end of session. Call bell and phone within reach. All needs met and pt reports no further questions for OT at this time.   Transfers   Sit to Stand 6  Modified independent   Additional items Bedrails   Stand to Sit 6  Modified independent   Additional items Armrests;Increased time required   Toilet transfer 6  Modified independent   Additional items Increased time required;Standard toilet   Functional Mobility   Functional Mobility 5  Supervision   Additional Comments w/o use of AD   Balance   Static Sitting Good   Dynamic Sitting Fair +   Static Standing Fair +   Dynamic Standing Fair   Ambulatory Fair   Activity Tolerance   Activity Tolerance Patient tolerated treatment well   Medical Staff Made Aware Clarissa, PT   Nurse Made Aware yes; SIMIN Young   RUE Assessment   RUE Assessment WFL  (4/5 throughout)   LUE Assessment   LUE Assessment WFL  (4/5 throughout)   Hand Function   Gross Motor Coordination Functional   Fine Motor Coordination Functional   Sensation   Light Touch No apparent deficits   Proprioception   Proprioception No apparent deficits   Vision-Basic Assessment   Current Vision Wears glasses all the time   Visual History Cataracts  (R eye  per pt report)   Vision - Complex Assessment   Acuity Able to read clock/calendar on wall without difficulty   Additional Comments Pt reports decreased acuity in L eye   Psychosocial   Psychosocial (WDL) WDL   Perception   Inattention/Neglect Appears intact   Cognition   Overall Cognitive Status WFL   Arousal/Participation Alert;Cooperative   Attention Within functional limits   Orientation Level Oriented to person;Oriented to place;Oriented to time   Memory Decreased recall of precautions   Following Commands Follows one step commands without difficulty   Comments Tangential in conversation, cues for redirection   Assessment   Prognosis Good   Assessment Pt is a 64 y.o. male seen for OT evaluation s/p adm to [facility] on 11/5/2024 w/ Stroke-like symptoms . Comorbidities affecting pt’s functional performance include a significant PMH of Arthritis, CVA, DM, HLD, HTN, MI. Pt with active OT orders and activity orders for Up and OOB as tolerated. Pt is homeless. At baseline, pt was I w/ ADLs, IADLs, and functional transfers/mobility w/o use of AD. (-) . (+) fall PTA. Upon evaluation, pt currently functioning at a Supervision-Mod I level for ADLs, Mod I for bed mobility, Mod I for transfers, and Supervision for functional mobility 2* the following deficits impacting occupational performance: decreased balance, decreased activity tolerance, and limited functional reach. Pt with the following personal factors of: limited home support, fall risk , homelessness , and access to transportation . Despite above mentioned deficits and personal factors, pt is functioning near baseline level of performance. Limited ADL deficits. No further acute OT needs identified at this time. Recommend continued mobilization with hospital staff and restorative program while in the hospital to increase pt’s endurance and strength upon D/C. The patient's raw score on the AM-PAC Daily Activity Inpatient Short Form is 21. A raw  score of greater than or equal to 19 suggests the patient may benefit from discharge to home. Please refer to the recommendation of the Occupational Therapist for safe discharge planning. D/C pt from OT caseload at this time.   Plan   OT Frequency Eval only  (D/C OT)   Discharge Recommendation   Rehab Resource Intensity Level, OT No post-acute rehabilitation needs   AM-PAC Daily Activity Inpatient   Lower Body Dressing 3   Bathing 3   Toileting 3   Upper Body Dressing 4   Grooming 4   Eating 4   Daily Activity Raw Score 21   Daily Activity Standardized Score (Calc for Raw Score >=11) 44.27   AM-PAC Applied Cognition Inpatient   Following a Speech/Presentation 4   Understanding Ordinary Conversation 4   Taking Medications 3   Remembering Where Things Are Placed or Put Away 4   Remembering List of 4-5 Errands 3   Taking Care of Complicated Tasks 3   Applied Cognition Raw Score 21   Applied Cognition Standardized Score 44.3       María Hilton, OTR/L

## 2024-11-06 NOTE — ASSESSMENT & PLAN NOTE
His friend noticed a left facial droop today.  For me there is no facial droop seen.  We will put him on stroke pathway  He does not take aspirin daily so we will start that.  Neurology input is greatly appreciated.  MRI has not yet been done.  The patient's symptoms may be related to hypotension.

## 2024-11-06 NOTE — UTILIZATION REVIEW
Initial Clinical Review    Admission: Date/Time/Statement:   Admission Orders (From admission, onward)       Ordered        11/05/24 1816  INPATIENT ADMISSION  Once                          Orders Placed This Encounter   Procedures    INPATIENT ADMISSION     Standing Status:   Standing     Number of Occurrences:   1     Order Specific Question:   Level of Care     Answer:   Med Surg [16]     Order Specific Question:   Estimated length of stay     Answer:   More than 2 Midnights     Order Specific Question:   Certification     Answer:   I certify that inpatient services are medically necessary for this patient for a duration of greater than two midnights. See H&P and MD Progress Notes for additional information about the patient's course of treatment.     ED Arrival Information       Expected   -    Arrival   11/5/2024 16:15    Acuity   Emergent              Means of arrival   Ambulance    Escorted by   [facility] ([facility])    Service   Hospitalist    Admission type   Emergency              Arrival complaint   Slurred speach             Chief Complaint   Patient presents with    CVA/TIA-like Symptoms     Pt arrives via EMS. Pt from [facility]. Starting an hr ago he had left sided facial droop and slurred speech. Pt had 70/30 pressures. Pt started on levo in EMS. Pt hx of strokes before.        Initial Presentation: 64 y.o. male presents to the ED via EMS - pt is homeless - with c/o L facial droop, acting differently.  EMS has BP 70/30 treated with Levophed.   PMH: psych disorder, NIDDM, previous stroke.  In the ED pt is hypotensive but improving, good sat, no c/o pain.  Labs - Elevated lactic acid, glucose 281, creat, glucosuria, acidotic gasses.  Imaging - no acute disease, extensive right maxillary and sphenoid sinus mucosal thickening, mildly patchy airspace disease RUL, poss PNA.  Treated with IV fluids 2 L, IV antibiotics.  On exam normal breath sounds, no sensory or motor deficits, alert, no facial  droop.  Admitted to INPATIENT status with TIA - , stroke pathway, ASA, MRI Brain, neuro consult, SSI cover, continue psych meds.      11/5 Neuro Quick Note - NIHSS 2 R facial droop (chronic), worsened slurred speech, no acute changes on imaging, hypotensive.  No TNK.  Will load ASA, MRI Brain, stroke alert.     Date: 11/6   Day 2:   TIA,  hypotension - some hypotension this AM.  Was seen by SLP has no speech difficulty. Remains on IV fluids.  Afebrile. On room air, MRI Brain pending.  Creat WNL, low calcium, Mag.  Obstruction series completed - no metalic objects on study.  Echo completed.  Pt notes he feels almost back to baseline today. Active bowel sounds and no other deficits on exam today.  No PT needs.      11/6 Neuro Consult - stroke like symptoms - NIHSS 2 - acting funny, slurred speech and right facial asymmetry that appeared different to his friend. Hypotensive in ED, well controlled today.  Imaging negative for acute disease,  UDS negative. On exam feels almost back to normal, + fatigue.  Smile WNL. Will start ASA, statin, MRI Brain pending, tele, permissive HTN.     ED Treatment-Medication Administration from 11/05/2024 1615 to 11/05/2024 2029         Date/Time Order Dose Route Action     11/05/2024 1641 lactated ringers bolus 1,000 mL 1,000 mL Intravenous New Bag     11/05/2024 1733 norepinephrine (LEVOPHED) 1 mg/mL injection 4 mg 0 mg Does not apply Given to EMS     11/05/2024 1634 iohexol (OMNIPAQUE) 350 MG/ML injection (SINGLE-DOSE) 85 mL 85 mL Intravenous Given     11/05/2024 1726 cefepime (MAXIPIME) 2 g/50 mL dextrose IVPB 2,000 mg Intravenous New Bag     11/05/2024 1805 vancomycin (VANCOCIN) 1500 mg in sodium chloride 0.9% 250 mL IVPB 1,500 mg Intravenous New Bag     11/05/2024 1715 lactated ringers bolus 1,000 mL -- Intravenous Restarted     11/05/2024 1813 lactated ringers infusion 125 mL/hr Intravenous New Bag            Scheduled Medications:  [START ON 11/7/2024] aspirin, 81 mg, Oral,  Daily  atorvastatin, 40 mg, Oral, QPM  benztropine, 0.5 mg, Oral, BID  FLUoxetine, 20 mg, Oral, Daily  heparin (porcine), 5,000 Units, Subcutaneous, Q8H RU  hydrOXYzine HCL, 50 mg, Oral, Q6H  lactated ringers, 1,000 mL, Intravenous, Once  levothyroxine, 50 mcg, Oral, Early Morning  risperiDONE, 3 mg, Oral, HS  rOPINIRole, 0.25 mg, Oral, HS  traZODone, 100 mg, Oral, Q12H RU      Continuous IV Infusions:  lactated ringers, 125 mL/hr, Intravenous, Continuous  Levophed drip - not given.       PRN Meds:     ED Triage Vitals   Temperature Pulse Respirations Blood Pressure SpO2 Pain Score   11/05/24 1704 11/05/24 1619 11/05/24 1619 11/05/24 1617 11/05/24 1619 11/05/24 2033   98.6 °F (37 °C) 95 18 (!) 82/49 98 % No Pain     Weight (last 2 days)       Date/Time Weight    11/06/24 0830 61.7 (136)    11/05/24 2040 61.8 (136.24)    11/05/24 1620 61.8 (136.24)            Vital Signs (last 3 days)       Date/Time Temp Pulse Resp BP MAP (mmHg) SpO2 O2 Device Patient Position - Orthostatic VS [place] Coma Scale Score Pain    11/06/24 1449 100 °F (37.8 °C) 91 17 129/60 -- 99 % None (Room air) Lying -- --    11/06/24 1140 -- -- -- -- -- -- -- -- 15 --    11/06/24 1100 -- 65 17 98/60 -- -- -- Sitting -- --    11/06/24 0830 -- 61 -- 102/59 -- -- -- -- -- --    11/06/24 0740 97.9 °F (36.6 °C) 61 18 102/59 -- 97 % None (Room air) Lying 15 No Pain    11/06/24 0540 -- 69 18 108/56 76 99 % -- Lying 15 --    11/06/24 0340 -- 63 18 91/51 64 95 % -- Lying 15 --    11/06/24 0140 -- 65 16 91/55 68 95 % -- Lying 15 --    11/05/24 2340 98.1 °F (36.7 °C) 65 16 83/48 60 94 % None (Room air) Lying 15 --    11/05/24 2240 98.3 °F (36.8 °C) 65 16 92/53 68 96 % None (Room air) Lying 15 No Pain    11/05/24 2140 98 °F (36.7 °C) 63 16 95/55 70 96 % None (Room air) Lying 15 --    11/05/24 2042 -- -- -- -- -- -- -- -- 15 No Pain    11/05/24 2040 98.4 °F (36.9 °C) 68 16 100/62 67 100 % None (Room air) Lying -- --    11/05/24 2033 -- -- -- -- -- -- -- -- --  No Pain    11/05/24 2019 -- 66 16 109/61 -- 96 % None (Room air) -- 15 --    11/05/24 2000 -- 68 16 96/54 71 97 % None (Room air) Lying -- --    11/05/24 1920 -- 70 18 97/55 -- -- None (Room air) Lying 15 --    11/05/24 1900 -- 66 18 96/55 72 99 % None (Room air) Lying -- --    11/05/24 1850 -- -- -- -- -- -- -- -- 15 --    11/05/24 1845 -- 68 19 101/54 74 98 % None (Room air) Lying -- --    11/05/24 1820 -- 70 18 91/54 68 99 % None (Room air) Lying 15 --    11/05/24 1813 -- -- -- 106/64 89 -- -- -- -- --    11/05/24 1750 -- 74 18 92/55 69 97 % None (Room air) Lying 14 --    11/05/24 1730 -- 76 18 107/56 76 98 % None (Room air) Lying -- --    11/05/24 1720 -- 75 18 105/77 -- 99 % None (Room air) Lying -- --    11/05/24 1705 -- 75 18 102/55 -- 99 % None (Room air) -- 14 --    11/05/24 1704 98.6 °F (37 °C) -- -- -- -- -- -- -- -- --    11/05/24 1700 -- 74 21 -- -- -- -- -- -- --    11/05/24 16:59:01 -- -- -- 102/51 -- -- -- -- -- --    11/05/24 1659 -- -- -- -- -- 92 % -- -- -- --    11/05/24 1657 -- -- -- -- -- 84 % -- -- -- --    11/05/24 1655 -- 76 21 -- -- -- -- -- -- --    11/05/24 16:52:14 -- -- -- 77/45 -- -- -- -- -- --    11/05/24 1650 -- 75 16 77/45 -- 98 % None (Room air) Lying 14 --    11/05/24 1649 -- -- -- -- -- 98 % -- -- -- --    11/05/24 16:46:52 -- -- -- 84/50 -- -- -- -- -- --    11/05/24 1635 -- 75 18 78/41 -- 99 % None (Room air) Lying 14 --    11/05/24 1621 -- -- -- -- -- -- -- -- 14 --    11/05/24 1620 -- 84 -- -- -- 100 % -- -- 14 --    11/05/24 1619 -- 95 18 82/49 -- 98 % None (Room air) Lying -- --    11/05/24 16:17:18 -- -- -- 82/49 -- -- -- -- -- --              Pertinent Labs/Diagnostic Test Results:   Radiology:    11/6 OBSTRUCTION SERIES - No metallic foreign body seen within the chest, abdomen, or pelvis. Artifactual densities related to snaps on the clothing and multiple locations. Findings specks of radiopaque material in the colon, presumably just dense enteric contents.       X-ray  chest 1 view portable   Final Interpretation by Pablo Marquez MD (11/06 0914)      No acute cardiopulmonary disease.            Resident: ANITA Caldwell I, the attending radiologist, have reviewed the images and agree with the final report above.      Workstation performed: [id number]         CTA stroke alert (head/neck)   Final Interpretation by Spike Slater DO (11/05 1648)      No large vessel occlusion. No stenosis. No vascular malformation.      Mild patchy airspace disease in the posterior aspect of the right upper lobe are stable. Correlate for signs of pneumonia.      Findings were directly communicated with Zak Arriaga of Neurology and Dr. Jo of the Ed via epic secure chat at 4:45 p.m.      Workstation performed: [id number]         CT stroke alert brain   Final Interpretation by Spike Slater DO (11/05 1649)      No acute intracranial abnormality.      Extensive right maxillary and sphenoid sinus mucosal thickening.      Findings were directly discussed with Zak Arriaga  of Neurology and Dr. Jo of the ED via [employer] secure chat at approximately 4:40 p.m.      Workstation performed: [id number]         MRI Inpatient Order    (Results Pending)        Cardiology:  Echo complete w/ contrast if indicated     Left Ventricle: Left ventricular cavity size is normal. Wall thickness is normal. The left ventricular ejection fraction is 65%. Systolic function is normal. Wall motion is normal. Diastolic function is mildly abnormal, consistent with grade I (abnormal) relaxation.    Left Atrium: The atrium is mildly dilated.    Atrial Septum: No patent foramen ovale detected, confirmed at rest using agitated saline contrast, confirmed by provocation with cough, using agitated saline contrast.    Tricuspid Valve: There is mild regurgitation.      ECG 12 lead   Final Result by Jenna Peña MD (11/05 3384)   Age and gender specific ECG analysis    Sinus rhythm with sinus arrhythmia   Normal ECG    When compared with ECG of 05-Nov-2024 16:49,   T wave inversion no longer evident in Inferior leads   Confirmed by Jenna Peña [id number]) on 11/5/2024 9:54:40 PM        GI:  No orders to display           Results from last 7 days   Lab Units 11/06/24 0536 11/05/24 2009 11/05/24  1627   WBC Thousand/uL 5.64  --  9.14   HEMOGLOBIN g/dL 11.5*  --  11.7*   HEMATOCRIT % 34.8*  --  34.8*   PLATELETS Thousands/uL 151 160 169   TOTAL NEUT ABS Thousands/µL 3.80  --   --          Results from last 7 days   Lab Units 11/06/24 0536 11/05/24  1627   SODIUM mmol/L 137 135   POTASSIUM mmol/L 3.5 3.7   CHLORIDE mmol/L 106 100   CO2 mmol/L 26 28   ANION GAP mmol/L 5 7   BUN mg/dL 17 25   CREATININE mg/dL 1.04 2.14*   EGFR ml/min/1.73sq m 76 32   CALCIUM mg/dL 8.3* 8.6   MAGNESIUM mg/dL 1.6*  --          Results from last 7 days   Lab Units 11/05/24  1622   POC GLUCOSE mg/dl 247*     Results from last 7 days   Lab Units 11/06/24 0536 11/05/24  1627   GLUCOSE RANDOM mg/dL 151* 281*         Results from last 7 days   Lab Units 11/06/24 0536   HEMOGLOBIN A1C % 7.7*   EAG mg/dl 174          Results from last 7 days   Lab Units 11/05/24  1724   PH SANDRA  7.296*   PCO2 SANDRA mm Hg 49.7   PO2 SANDRA mm Hg 32.1*   HCO3 SANDRA mmol/L 23.7*   BASE EXC SANDRA mmol/L -3.1   O2 CONTENT SANDRA ml/dL 9.6   O2 HGB, VENOUS % 55.8*             Results from last 7 days   Lab Units 11/05/24 2009 11/05/24  1840 11/05/24  1627   HS TNI 0HR ng/L  --   --  12   HS TNI 2HR ng/L  --  14  --    HSTNI D2 ng/L  --  2  --    HS TNI 4HR ng/L 14  --   --    HSTNI D4 ng/L 2  --   --          Results from last 7 days   Lab Units 11/05/24  1627   PROTIME seconds 15.3*   INR  1.20*   PTT seconds 25       Results from last 7 days   Lab Units 11/05/24 2009 11/05/24  1724   LACTIC ACID mmol/L 0.8 2.9*       Results from last 7 days   Lab Units 11/05/24  1835   CLARITY UA  Clear   COLOR UA  Light Yellow   SPEC GRAV UA  1.025   PH UA  5.5   GLUCOSE UA mg/dl 500 (1/2%)*   KETONES  UA mg/dl Negative   BLOOD UA  Negative   PROTEIN UA mg/dl Negative   NITRITE UA  Negative   BILIRUBIN UA  Negative   UROBILINOGEN UA (BE) mg/dl <2.0   LEUKOCYTES UA  Negative   WBC UA /hpf 1-2   RBC UA /hpf None Seen   BACTERIA UA /hpf Occasional   EPITHELIAL CELLS WET PREP /hpf Occasional   MUCUS THREADS  Occasional*             Results from last 7 days   Lab Units 11/05/24  1835   AMPH/METH  Negative   BARBITURATE UR  Negative   BENZODIAZEPINE UR  Negative   COCAINE UR  Negative   METHADONE URINE  Negative   OPIATE UR  Negative   PCP UR  Negative   THC UR  Negative           Results from last 7 days   Lab Units 11/05/24  1724   BLOOD CULTURE  Received in Microbiology Lab. Culture in Progress.  Received in Microbiology Lab. Culture in Progress.         Past Medical History:   Diagnosis Date    Arthritis     CVA (cerebral vascular accident) ([facility]) 2011    Diabetes mellitus ([facility])     Hyperlipidemia     Hypertension     Malignant neoplasm of lung ([facility]) 1985    Last Assessed:  4/7/15    Myocardial infarction ([facility]) 2011    Last Assessed:  9/22/16    Psychiatric disorder      Present on Admission:   Stroke-like symptoms   Type 2 diabetes mellitus without complication, without long-term current use of insulin ([facility])   Psychiatric disorder   Hypotension   Essential hypertension   JARED (acute kidney injury) ([facility])      Admitting Diagnosis: Slurred speech [R47.81]  Hypotension [I95.9]  Lactate blood increased [R79.89]  JARED (acute kidney injury) ([facility]) [N17.9]  Stroke-like symptom [R29.90]  Age/Sex: 64 y.o. male    Network Utilization Review Department  ATTENTION: Please call with any questions or concerns to [phone] and carefully listen to the prompts so that you are directed to the right person. All voicemails are confidential.   For Discharge needs, contact Care Management DC Support Team at [phone] opt. [phone]  Send all requests for admission clinical reviews, approved or denied determinations and any other requests to  dedicated fax number below belonging to the campus where the patient is receiving treatment. List of dedicated fax numbers for the Facilities:  FACILITY NAME UR FAX NUMBER   ADMISSION DENIALS (Administrative/Medical Necessity) [phone]   DISCHARGE SUPPORT TEAM (NETWORK) [phone]   PARENT CHILD HEALTH (Maternity/NICU/Pediatrics) [phone]   [facility] [phone]   [facility] [phone]   [facility] [phone]   [facility] [phone]   [facility] [phone]   [facility] [phone]   [facility] [phone]   [facility] [phone]   [facility] [phone]   [facility] [phone]   [facility] [phone]   [facility] [phone]
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
## 2024-11-06 NOTE — CASE MANAGEMENT
Case Management Assessment & Discharge Planning Note    Patient name Amadeo Schultz  Location [facility] /E4 -* MRN [id number]  : 1960 Date 2024       Current Admission Date: 2024  Current Admission Diagnosis:Stroke-like symptoms   Patient Active Problem List    Diagnosis Date Noted Date Diagnosed    Hypotension 2024     Stroke-like symptoms 2024     Psychiatric disorder 2024     Prediabetes 2019     Microscopic hematuria 2019     Type 2 diabetes mellitus without complication, without long-term current use of insulin (HCC) 10/02/2018     Chronic bilateral low back pain with bilateral sciatica 10/02/2018     Memory difficulty 2018     Chronic pain of right knee 2018     Gait abnormality 2016     Essential hypertension 2014       LOS (days): 1  Geometric Mean LOS (GMLOS) (days): 2  Days to GMLOS:1.2     OBJECTIVE:    Risk of Unplanned Readmission Score: 13.84         Current admission status: Inpatient  Referral Reason: Stroke    Preferred Pharmacy:   [facility] [address] [address]  Phone: [phone] Fax: [phone]    Primary Care Provider: No primary care provider on file.    Primary Insurance: [employer]  Secondary Insurance:     ASSESSMENT:  Active Health Care Proxies    There are no active Health Care Proxies on file.       Advance Directives  Does patient have a Health Care POA?: No  Was patient offered paperwork?: Yes (Declined)  Does patient currently have a Health Care decision maker?: Yes, please see Health Care Proxy section  Does patient have Advance Directives?: No  Was patient offered paperwork?: Yes (Declined)         Readmission Root Cause  30 Day Readmission: No    Patient Information  Admitted from:: Other (comment) (Homeless)  Mental Status: Alert  During Assessment patient was accompanied by: Not accompanied during assessment  Assessment  "information provided by:: Patient  Primary Caregiver: Self  Support Systems: Family members, Friend  County of Residence: [place]  What city do you live in?: Homeless in [facility]  Home entry access options. Select all that apply.: Other access (Comment) (Homeless)  Type of Current Residence: Homeless  Living Arrangements: Other (Comment) (Homeless)  Is patient a ?: No    Activities of Daily Living Prior to Admission  Functional Status: Independent  Completes ADLs independently?: Yes  Ambulates independently?: Yes  Does patient use assisted devices?: No  Does patient currently own DME?: No  Does patient have a history of Outpatient Therapy (PT/OT)?: No  Does the patient have a history of Short-Term Rehab?: No  Does patient have a history of HHC?: No  Does patient currently have HHC?: No         Patient Information Continued  Income Source: SSI/SSD  Does patient have prescription coverage?: Yes  Does patient receive dialysis treatments?: No  Does patient have a history of substance abuse?: Yes  Historical substance use preference: \"Crack\" cocaine, Methamphetamines  History of Withdrawal Symptoms: Other withdrawal symptoms (specify in comment)  Is patient currently in treatment for substance abuse?: No. Patient declined treatment information.  Does patient have a history of Mental Health Diagnosis?: Yes (Bipolar disorder, Anxiety)  Is patient receiving treatment for mental health?: Yes (Patient stated he is connected with [facility])  Has patient received inpatient treatment related to mental health in the last 2 years?: No         Means of Transportation  Means of Transport to Appts:: Public Transportation - Bus      Social Determinants of Health (SDOH)      Flowsheet Row Most Recent Value   Housing Stability    In the last 12 months, was there a time when you were not able to pay the mortgage or rent on time? Y   In the past 12 months, how many times have you moved where you were living? 2   At any time in the " past 12 months, were you homeless or living in a shelter (including now)? Y   Transportation Needs    In the past 12 months, has lack of transportation kept you from medical appointments or from getting medications? no   In the past 12 months, has lack of transportation kept you from meetings, work, or from getting things needed for daily living? No   Food Insecurity    Within the past 12 months, you worried that your food would run out before you got the money to buy more. Often true   Within the past 12 months, the food you bought just didn't last and you didn't have money to get more. Often true   Utilities    In the past 12 months has the electric, gas, oil, or water company threatened to shut off services in your home? Yes  [Patient homeless]            DISCHARGE DETAILS:    Discharge planning discussed with:: Patient  Freedom of Choice: Yes  Comments - Freedom of Choice: CM provided community resources, 2-1-1- information, shelter lists, and food bank information  CM contacted family/caregiver?: No- see comments (Pt declined)  Were Treatment Team discharge recommendations reviewed with patient/caregiver?: Yes  Did patient/caregiver verbalize understanding of patient care needs?: Yes  Were patient/caregiver advised of the risks associated with not following Treatment Team discharge recommendations?: Yes         Requested Home Health Care         Is the patient interested in HHC at discharge?: No    DME Referral Provided  Referral made for DME?: No         Would you like to participate in our [facility] service program?  : No - Declined    Treatment Team Recommendation: Home (Homeless)  Discharge Destination Plan:: Home (Homeless)                       CM received message from RN, patient's friend requesting STR or SNF placement as patient is homeless.  Friend disclosed that patient is a registered sex offender.    CM met with patient at bedside to introduce self and role with DC planning.  Patient  reports that he has been unhoused for over a year now and living on the streets in [place].  Patient reports that he is connected with [facility] and the [facility] social workers.  Patient reports receiving SSI and SSDI around $900 per month.  CM discussed with patient that he does not have a skilled need at this time that would qualify him for STR,  patient expressed understanding.  CM provided patient with resources including 2-1-1, shelter lists, and food bank lists.  Patient plans to follow up with [facility] and the [facility] after DC.  CM will continue to follow.

## 2024-11-06 NOTE — PROGRESS NOTES
Progress Note - Hospitalist   Name: Amadeo Schultz 64 y.o. male I MRN: [id number]  Unit/Bed#: E4 -01 I Date of Admission: 11/5/2024   Date of Service: 11/6/2024 I Hospital Day: 1    Assessment & Plan  Stroke-like symptoms  His friend noticed a left facial droop today.  For me there is no facial droop seen.  We will put him on stroke pathway  He does not take aspirin daily so we will start that.  Neurology input is greatly appreciated.  MRI has not yet been done.  The patient's symptoms may be related to hypotension.    Type 2 diabetes mellitus without complication, without long-term current use of insulin (HCC)  Lab Results   Component Value Date    HGBA1C 7.7 (H) 11/06/2024       Recent Labs     11/05/24  1622   POCGLU 247*         Psychiatric disorder  Patient has unclear psychiatric disorder.  Continue his psych medications.  JARED (acute kidney injury) ([facility])  The patient's creatinine was elevated at the time of admission.  It has now normalized.  Essential hypertension  The patient has a history of hypertension but is not currently on any medication for this.  Hypotension  The patient was hypotensive at the time of admission.  This responded to IV hydration.  We will continue to monitor his pressure carefully.        Subjective:  The patient is feeling better.  He feels like he is almost back to normal.  He has no headache.  He has no chest pain or shortness of breath.  He denies abdominal pain, nausea, or vomiting.  He has been able to eat.  He did not sleep very well.    Physical Exam:   Temp:  [97.9 °F (36.6 °C)-100 °F (37.8 °C)] 100 °F (37.8 °C)  HR:  [61-95] 91  BP: ()/(41-77) 129/60  Resp:  [16-21] 17  SpO2:  [84 %-100 %] 99 %  O2 Device: None (Room air)    Gen: Well-developed, well-nourished, in no distress.  Neck: Supple.  No lymphadenopathy or goiter.  Heart: Regular rhythm.  I heard no murmur, gallop, or rub.  Lungs: Clear to auscultation and percussion.  No wheezing, rales, or rhonchi.  Abd:  Soft with active bowel sounds.  No mass, tenderness, organomegaly.  Extremities: No clubbing, cyanosis, or edema.  No calf tenderness.  Neuro: Alert and oriented.  No facial droop is apparent present.  Skin: Warm and dry.      LABS:   CBC:   Lab Results   Component Value Date    WBC 5.64 11/06/2024    HGB 11.5 (L) 11/06/2024    HCT 34.8 (L) 11/06/2024    MCV 90 11/06/2024     11/06/2024    RBC 3.88 11/06/2024    MCH 29.6 11/06/2024    MCHC 33.0 11/06/2024    RDW 12.6 11/06/2024    MPV 9.6 11/06/2024    NRBC 0 11/06/2024   , CMP:   Lab Results   Component Value Date    SODIUM 137 11/06/2024    K 3.5 11/06/2024     11/06/2024    CO2 26 11/06/2024    BUN 17 11/06/2024    CREATININE 1.04 11/06/2024    CALCIUM 8.3 (L) 11/06/2024    EGFR 76 11/06/2024           VTE Pharmacologic Prophylaxis: Heparin  VTE Mechanical Prophylaxis: sequential compression device

## 2024-11-06 NOTE — ASSESSMENT & PLAN NOTE
64 y.o. homeless male with prior stroke, CAD s/p MI, HTN, HLD, DM2, lung cancer, chronic pain and prior tobacco use who presented to the ED on 11/5/24 as a stroke alert due to acting funny, slurred speech and right facial asymmetry that appeared different to his friend. NIHSS 2. LKW was the night prior.  Patient hypotensive with systolics in the 80s in the ED.  Not a TNK candidate as patient was out of the window.    Work-up:  CTH:   No acute intracranial abnormality.  Extensive right maxillary and sphenoid sinus mucosal thickening.  CTA H/N:  No large vessel occlusion. No stenosis. No vascular malformation.  Mild patchy airspace disease in the posterior aspect of the right upper lobe are stable. Correlate for signs of pneumonia.  LDL 41  A1C 7.7  UDS negative  Echo: EF 65%. Mildly dilated LA. Normal sized RA.     On neuroexam, patient reports he is near baseline aside from feeling tired.  He, at first glance, appears to have some lower facial asymmetry however this is due to volitional contortion of his lower face.  When asked to smile this appears to resolved.  Patient also intermittently closes his left eye which he states has been going on for a long period of time.  No other focal deficits appreciated on exam.  Etiology of deficits unclear with differential including decreased perfusion in the setting of hypotension/stroke/TIA/recrudescence/toxic metabolic etiology. Proceed with stroke pathway as detailed below.    Plan:  Acute ischemic stroke protocol  Aspirin 325mg load on 11/6AM  Continue Aspirin 81mg daily (stated as a home med, but patient noncompliant)  Atorvastatin 40mg at this time, if MRI brain negative can return to home dose of 10mg qhs  MRI brain without contrast   Telemetry  Secondary stroke risk factor modification  Permissive hypertension with max of /110   Goal of normothermia and euglycemia   PT/OT/ST  Stroke Education  Frequent neurological checks  Stat CT head with worsening in neuro  exam  Notify neurology with any changes in exam   Metabolic/infectious workup as per primary team

## 2024-11-06 NOTE — PLAN OF CARE
Problem: Potential for Falls  Goal: Patient will remain free of falls  Description: INTERVENTIONS:  - Educate patient/family on patient safety including physical limitations  - Instruct patient to call for assistance with activity   - Consult OT/PT to assist with strengthening/mobility   - Keep Call bell within reach  - Keep bed low and locked with side rails adjusted as appropriate  - Keep care items and personal belongings within reach  - Initiate and maintain comfort rounds  - Make Fall Risk Sign visible to staff  - Offer Toileting every  Hours, in advance of need  - Initiate/Maintain alarm  - Obtain necessary fall risk management equipment:   - Apply yellow socks and bracelet for high fall risk patients  - Consider moving patient to room near nurses station  Outcome: Progressing     Problem: PAIN - ADULT  Goal: Verbalizes/displays adequate comfort level or baseline comfort level  Description: Interventions:  - Encourage patient to monitor pain and request assistance  - Assess pain using appropriate pain scale  - Administer analgesics based on type and severity of pain and evaluate response  - Implement non-pharmacological measures as appropriate and evaluate response  - Consider cultural and social influences on pain and pain management  - Notify physician/advanced practitioner if interventions unsuccessful or patient reports new pain  Outcome: Progressing     Problem: INFECTION - ADULT  Goal: Absence or prevention of progression during hospitalization  Description: INTERVENTIONS:  - Assess and monitor for signs and symptoms of infection  - Monitor lab/diagnostic results  - Monitor all insertion sites, i.e. indwelling lines, tubes, and drains  - Monitor endotracheal if appropriate and nasal secretions for changes in amount and color  - Wickes appropriate cooling/warming therapies per order  - Administer medications as ordered  - Instruct and encourage patient and family to use good hand hygiene technique  -  Identify and instruct in appropriate isolation precautions for identified infection/condition  Outcome: Progressing  Goal: Absence of fever/infection during neutropenic period  Description: INTERVENTIONS:  - Monitor WBC    Outcome: Progressing     Problem: SAFETY ADULT  Goal: Patient will remain free of falls  Description: INTERVENTIONS:  - Educate patient/family on patient safety including physical limitations  - Instruct patient to call for assistance with activity   - Consult OT/PT to assist with strengthening/mobility   - Keep Call bell within reach  - Keep bed low and locked with side rails adjusted as appropriate  - Keep care items and personal belongings within reach  - Initiate and maintain comfort rounds  - Make Fall Risk Sign visible to staff  - Offer Toileting every  Hours, in advance of need  - Initiate/Maintain alarm  - Obtain necessary fall risk management equipment:   - Apply yellow socks and bracelet for high fall risk patients  - Consider moving patient to room near nurses station  Outcome: Progressing  Goal: Maintain or return to baseline ADL function  Description: INTERVENTIONS:  -  Assess patient's ability to carry out ADLs; assess patient's baseline for ADL function and identify physical deficits which impact ability to perform ADLs (bathing, care of mouth/teeth, toileting, grooming, dressing, etc.)  - Assess/evaluate cause of self-care deficits   - Assess range of motion  - Assess patient's mobility; develop plan if impaired  - Assess patient's need for assistive devices and provide as appropriate  - Encourage maximum independence but intervene and supervise when necessary  - Involve family in performance of ADLs  - Assess for home care needs following discharge   - Consider OT consult to assist with ADL evaluation and planning for discharge  - Provide patient education as appropriate  Outcome: Progressing  Goal: Maintains/Returns to pre admission functional level  Description:  INTERVENTIONS:  - Perform AM-PAC 6 Click Basic Mobility/ Daily Activity assessment daily.  - Set and communicate daily mobility goal to care team and patient/family/caregiver.   - Collaborate with rehabilitation services on mobility goals if consulted  - Perform Range of Motion  times a day.  - Reposition patient every  hours.  - Dangle patient  times a day  - Stand patient  times a day  - Ambulate patient  times a day  - Out of bed to chair  times a day   - Out of bed for meal times a day  - Out of bed for toileting  - Record patient progress and toleration of activity level   Outcome: Progressing     Problem: DISCHARGE PLANNING  Goal: Discharge to home or other facility with appropriate resources  Description: INTERVENTIONS:  - Identify barriers to discharge w/patient and caregiver  - Arrange for needed discharge resources and transportation as appropriate  - Identify discharge learning needs (meds, wound care, etc.)  - Arrange for interpretive services to assist at discharge as needed  - Refer to Case Management Department for coordinating discharge planning if the patient needs post-hospital services based on physician/advanced practitioner order or complex needs related to functional status, cognitive ability, or social support system  Outcome: Progressing     Problem: Knowledge Deficit  Goal: Patient/family/caregiver demonstrates understanding of disease process, treatment plan, medications, and discharge instructions  Description: Complete learning assessment and assess knowledge base.  Interventions:  - Provide teaching at level of understanding  - Provide teaching via preferred learning methods  Outcome: Progressing     Problem: Prexisting or High Potential for Compromised Skin Integrity  Goal: Skin integrity is maintained or improved  Description: INTERVENTIONS:  - Identify patients at risk for skin breakdown  - Assess and monitor skin integrity  - Assess and monitor nutrition and hydration status  -  Monitor labs   - Assess for incontinence   - Turn and reposition patient  - Assist with mobility/ambulation  - Relieve pressure over bony prominences  - Avoid friction and shearing  - Provide appropriate hygiene as needed including keeping skin clean and dry  - Evaluate need for skin moisturizer/barrier cream  - Collaborate with interdisciplinary team   - Patient/family teaching  - Consider wound care consult   Outcome: Progressing     Problem: Neurological Deficit  Goal: Neurological status is stable or improving  Description: Interventions:  - Monitor and assess patient's level of consciousness, motor function, sensory function, and level of assistance needed for ADLs.   - Monitor and report changes from baseline. Collaborate with interdisciplinary team to initiate plan and implement interventions as ordered.   - Provide and maintain a safe environment.  - Consider seizure precautions.  - Consider fall precautions.  - Consider aspiration precautions.  - Consider bleeding precautions.  Outcome: Progressing     Problem: Activity Intolerance/Impaired Mobility  Goal: Mobility/activity is maintained at optimum level for patient  Description: Interventions:  - Assess and monitor patient  barriers to mobility and need for assistive/adaptive devices.  - Assess patient's emotional response to limitations.  - Collaborate with interdisciplinary team and initiate plans and interventions as ordered.  - Encourage independent activity per ability.  - Maintain proper body alignment.  - Perform active/passive rom as tolerated/ordered.  - Plan activities to conserve energy.  - Turn patient as appropriate  Outcome: Progressing     Problem: Communication Impairment  Goal: Ability to express needs and understand communication  Description: Assess patient's communication skills and ability to understand information.  Patient will demonstrate use of effective communication techniques, alternative methods of communication and  understanding even if not able to speak.     - Encourage communication and provide alternate methods of communication as needed.  - Collaborate with case management/ for discharge needs.  - Include patient/family/caregiver in decisions related to communication.  Outcome: Progressing     Problem: Potential for Aspiration  Goal: Non-ventilated patient's risk of aspiration is minimized  Description: Assess and monitor vital signs, respiratory status, and labs (WBC).  Monitor for signs of aspiration (tachypnea, cough, rales, wheezing, cyanosis, fever).    - Assess and monitor patient's ability to swallow.  - Place patient up in chair to eat if possible.  - HOB up at 90 degrees to eat if unable to get patient up into chair.  - Supervise patient during oral intake.   - Instruct patient/ family to take small bites.  - Instruct patient/ family to take small single sips when taking liquids.  - Follow patient-specific strategies generated by speech pathologist.  Outcome: Progressing  Goal: Ventilated patient's risk of aspiration is minimized  Description: Assess and monitor vital signs, respiratory status, airway cuff pressure, and labs (WBC).  Monitor for signs of aspiration (tachypnea, cough, rales, wheezing, cyanosis, fever).    - Elevate head of bed 30 degrees if patient has tube feeding.  - Monitor tube feeding.  Outcome: Progressing     Problem: Nutrition  Goal: Nutrition/Hydration status is improving  Description: Monitor and assess patient's nutrition/hydration status for malnutrition (ex- brittle hair, bruises, dry skin, pale skin and conjunctiva, muscle wasting, smooth red tongue, and disorientation). Collaborate with interdisciplinary team and initiate plan and interventions as ordered.  Monitor patient's weight and dietary intake as ordered or per policy. Utilize nutrition screening tool and intervene per policy. Determine patient's food preferences and provide high-protein, high-caloric foods as  appropriate.     - Assist patient with eating.  - Allow adequate time for meals.  - Encourage patient to take dietary supplement as ordered.  - Collaborate with clinical nutritionist.  - Include patient/family/caregiver in decisions related to nutrition.  Outcome: Progressing

## 2024-11-06 NOTE — UTILIZATION REVIEW
Initial Clinical Review    Admission: Date/Time/Statement:   Admission Orders (From admission, onward)       Ordered        11/05/24 1816  INPATIENT ADMISSION  Once                          Orders Placed This Encounter   Procedures    INPATIENT ADMISSION     Standing Status:   Standing     Number of Occurrences:   1     Order Specific Question:   Level of Care     Answer:   Med Surg [16]     Order Specific Question:   Estimated length of stay     Answer:   More than 2 Midnights     Order Specific Question:   Certification     Answer:   I certify that inpatient services are medically necessary for this patient for a duration of greater than two midnights. See H&P and MD Progress Notes for additional information about the patient's course of treatment.     ED Arrival Information       Expected   -    Arrival   11/5/2024 16:15    Acuity   Emergent              Means of arrival   Ambulance    Escorted by   [facility] ([facility])    Service   Hospitalist    Admission type   Emergency              Arrival complaint   Slurred speach             Chief Complaint   Patient presents with    CVA/TIA-like Symptoms     Pt arrives via EMS. Pt from [facility]. Starting an hr ago he had left sided facial droop and slurred speech. Pt had 70/30 pressures. Pt started on levo in EMS. Pt hx of strokes before.        Initial Presentation: 64 y.o. male presents to the ED via EMS - pt is homeless - with c/o L facial droop, acting differently.  EMS has BP 70/30 treated with Levophed.   PMH: psych disorder, NIDDM, previous stroke.  In the ED pt is hypotensive but improving, good sat, no c/o pain.  Labs - Elevated lactic acid, glucose 281, creat, glucosuria, acidotic gasses.  Imaging - no acute disease, extensive right maxillary and sphenoid sinus mucosal thickening, mildly patchy airspace disease RUL, poss PNA.  Treated with IV fluids 2 L, IV antibiotics.  On exam normal breath sounds, no sensory or motor deficits, alert, no facial  droop.  Admitted to INPATIENT status with TIA - , stroke pathway, ASA, MRI Brain, neuro consult, SSI cover, continue psych meds.      11/5 Neuro Quick Note - NIHSS 2 R facial droop (chronic), worsened slurred speech, no acute changes on imaging, hypotensive.  No TNK.  Will load ASA, MRI Brain, stroke alert.     Date: 11/6   Day 2:   TIA,  hypotension - some hypotension this AM.  Was seen by SLP has no speech difficulty. Remains on IV fluids.  Afebrile. On room air, MRI Brain pending.  Creat WNL, low calcium, Mag.     ED Treatment-Medication Administration from 11/05/2024 1615 to 11/05/2024 2029         Date/Time Order Dose Route Action     11/05/2024 1641 lactated ringers bolus 1,000 mL 1,000 mL Intravenous New Bag     11/05/2024 1733 norepinephrine (LEVOPHED) 1 mg/mL injection 4 mg 0 mg Does not apply Given to EMS     11/05/2024 1634 iohexol (OMNIPAQUE) 350 MG/ML injection (SINGLE-DOSE) 85 mL 85 mL Intravenous Given     11/05/2024 1726 cefepime (MAXIPIME) 2 g/50 mL dextrose IVPB 2,000 mg Intravenous New Bag     11/05/2024 1805 vancomycin (VANCOCIN) 1500 mg in sodium chloride 0.9% 250 mL IVPB 1,500 mg Intravenous New Bag     11/05/2024 1715 lactated ringers bolus 1,000 mL -- Intravenous Restarted     11/05/2024 1813 lactated ringers infusion 125 mL/hr Intravenous New Bag            Scheduled Medications:  [START ON 11/7/2024] aspirin, 81 mg, Oral, Daily  atorvastatin, 40 mg, Oral, QPM  benztropine, 0.5 mg, Oral, BID  FLUoxetine, 20 mg, Oral, Daily  heparin (porcine), 5,000 Units, Subcutaneous, Q8H RU  hydrOXYzine HCL, 50 mg, Oral, Q6H  lactated ringers, 1,000 mL, Intravenous, Once  levothyroxine, 50 mcg, Oral, Early Morning  risperiDONE, 3 mg, Oral, HS  rOPINIRole, 0.25 mg, Oral, HS  traZODone, 100 mg, Oral, Q12H RU      Continuous IV Infusions:  lactated ringers, 125 mL/hr, Intravenous, Continuous  Levophed drip - not given.       PRN Meds:     ED Triage Vitals   Temperature Pulse Respirations Blood Pressure SpO2  Pain Score   11/05/24 1704 11/05/24 1619 11/05/24 1619 11/05/24 1617 11/05/24 1619 11/05/24 2033   98.6 °F (37 °C) 95 18 (!) 82/49 98 % No Pain     Weight (last 2 days)       Date/Time Weight    11/06/24 0830 61.7 (136)    11/05/24 2040 61.8 (136.24)    11/05/24 1620 61.8 (136.24)            Vital Signs (last 3 days)       Date/Time Temp Pulse Resp BP MAP (mmHg) SpO2 O2 Device Patient Position - Orthostatic VS Harris Coma Scale Score Pain    11/06/24 0830 -- 61 -- 102/59 -- -- -- -- -- --    11/06/24 0740 97.9 °F (36.6 °C) 61 18 102/59 -- 97 % None (Room air) Lying 15 No Pain    11/06/24 0540 -- 69 18 108/56 76 99 % -- Lying 15 --    11/06/24 0340 -- 63 18 91/51 64 95 % -- Lying 15 --    11/06/24 0140 -- 65 16 91/55 68 95 % -- Lying 15 --    11/05/24 2340 98.1 °F (36.7 °C) 65 16 83/48 60 94 % None (Room air) Lying 15 --    11/05/24 2240 98.3 °F (36.8 °C) 65 16 92/53 68 96 % None (Room air) Lying 15 No Pain    11/05/24 2140 98 °F (36.7 °C) 63 16 95/55 70 96 % None (Room air) Lying 15 --    11/05/24 2042 -- -- -- -- -- -- -- -- 15 No Pain    11/05/24 2040 98.4 °F (36.9 °C) 68 16 100/62 67 100 % None (Room air) Lying -- --    11/05/24 2033 -- -- -- -- -- -- -- -- -- No Pain    11/05/24 2019 -- 66 16 109/61 -- 96 % None (Room air) -- 15 --    11/05/24 2000 -- 68 16 96/54 71 97 % None (Room air) Lying -- --    11/05/24 1920 -- 70 18 97/55 -- -- None (Room air) Lying 15 --    11/05/24 1900 -- 66 18 96/55 72 99 % None (Room air) Lying -- --    11/05/24 1850 -- -- -- -- -- -- -- -- 15 --    11/05/24 1845 -- 68 19 101/54 74 98 % None (Room air) Lying -- --    11/05/24 1820 -- 70 18 91/54 68 99 % None (Room air) Lying 15 --    11/05/24 1813 -- -- -- 106/64 89 -- -- -- -- --    11/05/24 1750 -- 74 18 92/55 69 97 % None (Room air) Lying 14 --    11/05/24 1730 -- 76 18 107/56 76 98 % None (Room air) Lying -- --    11/05/24 1720 -- 75 18 105/77 -- 99 % None (Room air) Lying -- --    11/05/24 1705 -- 75 18 102/55 -- 99 % None  (Room air) -- 14 --    11/05/24 1704 98.6 °F (37 °C) -- -- -- -- -- -- -- -- --    11/05/24 1700 -- 74 21 -- -- -- -- -- -- --    11/05/24 16:59:01 -- -- -- 102/51 -- -- -- -- -- --    11/05/24 1659 -- -- -- -- -- 92 % -- -- -- --    11/05/24 1657 -- -- -- -- -- 84 % -- -- -- --    11/05/24 1655 -- 76 21 -- -- -- -- -- -- --    11/05/24 16:52:14 -- -- -- 77/45 -- -- -- -- -- --    11/05/24 1650 -- 75 16 77/45 -- 98 % None (Room air) Lying 14 --    11/05/24 1649 -- -- -- -- -- 98 % -- -- -- --    11/05/24 16:46:52 -- -- -- 84/50 -- -- -- -- -- --    11/05/24 1635 -- 75 18 78/41 -- 99 % None (Room air) Lying 14 --    11/05/24 1621 -- -- -- -- -- -- -- -- 14 --    11/05/24 1620 -- 84 -- -- -- 100 % -- -- 14 --    11/05/24 1619 -- 95 18 82/49 -- 98 % None (Room air) Lying -- --    11/05/24 16:17:18 -- -- -- 82/49 -- -- -- -- -- --              Pertinent Labs/Diagnostic Test Results:   Radiology:  X-ray chest 1 view portable   Final Interpretation by Pablo Marquez MD (11/06 0914)      No acute cardiopulmonary disease.            Resident: ANITA Caldwell I, the attending radiologist, have reviewed the images and agree with the final report above.      Workstation performed: [id number]         CTA stroke alert (head/neck)   Final Interpretation by Spike Slater DO (11/05 1648)      No large vessel occlusion. No stenosis. No vascular malformation.      Mild patchy airspace disease in the posterior aspect of the right upper lobe are stable. Correlate for signs of pneumonia.      Findings were directly communicated with Zak Arriaga of Neurology and Dr. Jo of the Ed via epic secure chat at 4:45 p.m.      Workstation performed: [id number]         CT stroke alert brain   Final Interpretation by Spike Slater DO (11/05 1649)      No acute intracranial abnormality.      Extensive right maxillary and sphenoid sinus mucosal thickening.      Findings were directly discussed with Zak Arriaga  of Neurology and  Dr. Jo of the ED via [place] secure chat at approximately 4:40 p.m.      Workstation performed: [id number]         MRI Inpatient Order    (Results Pending)        Cardiology:  Echo complete w/ contrast if indicated     Left Ventricle: Left ventricular cavity size is normal. Wall thickness is normal. The left ventricular ejection fraction is 65%. Systolic function is normal. Wall motion is normal. Diastolic function is mildly abnormal, consistent with grade I (abnormal) relaxation.    Left Atrium: The atrium is mildly dilated.    Atrial Septum: No patent foramen ovale detected, confirmed at rest using agitated saline contrast, confirmed by provocation with cough, using agitated saline contrast.    Tricuspid Valve: There is mild regurgitation.      ECG 12 lead   Final Result by Jenna Peña MD (11/05 2154)   Age and gender specific ECG analysis    Sinus rhythm with sinus arrhythmia   Normal ECG   When compared with ECG of 05-Nov-2024 16:49,   T wave inversion no longer evident in Inferior leads   Confirmed by Jenna Peña ([id number]) on 11/5/2024 9:54:40 PM        GI:  No orders to display           Results from last 7 days   Lab Units 11/06/24 0536 11/05/24 2009 11/05/24  1627   WBC Thousand/uL 5.64  --  9.14   HEMOGLOBIN g/dL 11.5*  --  11.7*   HEMATOCRIT % 34.8*  --  34.8*   PLATELETS Thousands/uL 151 160 169   TOTAL NEUT ABS Thousands/µL 3.80  --   --          Results from last 7 days   Lab Units 11/06/24 0536 11/05/24  1627   SODIUM mmol/L 137 135   POTASSIUM mmol/L 3.5 3.7   CHLORIDE mmol/L 106 100   CO2 mmol/L 26 28   ANION GAP mmol/L 5 7   BUN mg/dL 17 25   CREATININE mg/dL 1.04 2.14*   EGFR ml/min/1.73sq m 76 32   CALCIUM mg/dL 8.3* 8.6   MAGNESIUM mg/dL 1.6*  --          Results from last 7 days   Lab Units 11/05/24  1622   POC GLUCOSE mg/dl 247*     Results from last 7 days   Lab Units 11/06/24 0536 11/05/24  1627   GLUCOSE RANDOM mg/dL 151* 281*         Results from last 7 days   Lab Units  11/06/24  0536   HEMOGLOBIN A1C % 7.7*   EAG mg/dl 174          Results from last 7 days   Lab Units 11/05/24  1724   PH SANDRA  7.296*   PCO2 SANDRA mm Hg 49.7   PO2 SANDRA mm Hg 32.1*   HCO3 SANDRA mmol/L 23.7*   BASE EXC SANDRA mmol/L -3.1   O2 CONTENT SANDRA ml/dL 9.6   O2 HGB, VENOUS % 55.8*             Results from last 7 days   Lab Units 11/05/24 2009 11/05/24  1840 11/05/24  1627   HS TNI 0HR ng/L  --   --  12   HS TNI 2HR ng/L  --  14  --    HSTNI D2 ng/L  --  2  --    HS TNI 4HR ng/L 14  --   --    HSTNI D4 ng/L 2  --   --          Results from last 7 days   Lab Units 11/05/24  1627   PROTIME seconds 15.3*   INR  1.20*   PTT seconds 25       Results from last 7 days   Lab Units 11/05/24 2009 11/05/24  1724   LACTIC ACID mmol/L 0.8 2.9*       Results from last 7 days   Lab Units 11/05/24  1835   CLARITY UA  Clear   COLOR UA  Light Yellow   SPEC GRAV UA  1.025   PH UA  5.5   GLUCOSE UA mg/dl 500 (1/2%)*   KETONES UA mg/dl Negative   BLOOD UA  Negative   PROTEIN UA mg/dl Negative   NITRITE UA  Negative   BILIRUBIN UA  Negative   UROBILINOGEN UA (BE) mg/dl <2.0   LEUKOCYTES UA  Negative   WBC UA /hpf 1-2   RBC UA /hpf None Seen   BACTERIA UA /hpf Occasional   EPITHELIAL CELLS WET PREP /hpf Occasional   MUCUS THREADS  Occasional*             Results from last 7 days   Lab Units 11/05/24  1835   AMPH/METH  Negative   BARBITURATE UR  Negative   BENZODIAZEPINE UR  Negative   COCAINE UR  Negative   METHADONE URINE  Negative   OPIATE UR  Negative   PCP UR  Negative   THC UR  Negative           Results from last 7 days   Lab Units 11/05/24  1724   BLOOD CULTURE  Received in Microbiology Lab. Culture in Progress.  Received in Microbiology Lab. Culture in Progress.         Past Medical History:   Diagnosis Date    Arthritis     Chronic pain     CVA (cerebral vascular accident) (HCC) 2011    Diabetes mellitus (HCC)     Hyperlipidemia     Hypertension     Malignant neoplasm of lung (HCC) 1985    Last Assessed:  4/7/15    Myocardial  infarction (HCC) 2011    Last Assessed:  9/22/16    Psychiatric disorder      Present on Admission:   Type 2 diabetes mellitus without complication, without long-term current use of insulin (HCC)      Admitting Diagnosis: Slurred speech [R47.81]  Hypotension [I95.9]  Lactate blood increased [R79.89]  JARED (acute kidney injury) (HCC) [N17.9]  Stroke-like symptom [R29.90]  Age/Sex: 64 y.o. male    Network Utilization Review Department  ATTENTION: Please call with any questions or concerns to [phone] and carefully listen to the prompts so that you are directed to the right person. All voicemails are confidential.   For Discharge needs, contact Care Management DC Support Team at [phone] opt. [phone]  Send all requests for admission clinical reviews, approved or denied determinations and any other requests to dedicated fax number below belonging to the campus where the patient is receiving treatment. List of dedicated fax numbers for the Facilities:  FACILITY NAME UR FAX NUMBER   ADMISSION DENIALS (Administrative/Medical Necessity) [phone]   DISCHARGE SUPPORT TEAM (NETWORK) [phone]   PARENT CHILD HEALTH (Maternity/NICU/Pediatrics) [phone]   [facility] [phone]   [facility] [phone]   [facility] [phone]   [facility] [phone]   formerly [facility] [phone]   [facility] [phone]   [facility] [phone]   [facility] [phone]   [facility] [phone]   [facility] [phone]   [facility] [phone]   [facility] [phone]

## 2024-11-06 NOTE — UTILIZATION REVIEW
Notification of Unplanned, Urgent, or   Emergency Inpatient Admission   AUTHORIZATION REQUEST   Admitting Facility [place] [address]  Tax ID: [id number]  NPI: [id number]  [facility]: [facility]  Admission Level of Care: Inpatient  Place of Service Code: 21     Attending Physician Information  Attending Name and NPI#: Onur Robertson Md [[id number]]  Phone: [phone]     Admission Information  Inpatient Admission Date/Time: 11/5/24  6:16 PM  Discharge Date/Time: No discharge date for patient encounter.  Admitting Diagnosis Code/Description:  Slurred speech [R47.81]  Hypotension [I95.9]  Lactate blood increased [R79.89]  JARED (acute kidney injury) (HCC) [N17.9]  Stroke-like symptom [R29.90]     Utilization Review Contact  Angela Herman, Utilization   Phone: [phone]  Fax: [phone]  Email: [email or web address]  Contact for approvals/pending authorizations, clinical reviews, and discharge.     Physician Advisory Services Contact  Medical Necessity Denial & Jhdr-nw-Odik Discussion  Phone: [phone]  Fax: [phone]  Email: [email or web address]     DISCHARGE SUPPORT TEAM:  For Patients Discharge Needs & Updates  Phone: [phone] opt. 2 Fax: [phone]  Email: [email or web address]

## 2024-11-06 NOTE — ASSESSMENT & PLAN NOTE
His friend noticed a left facial droop today.    For me there is no facial droop seen.      Will put him on stroke pathway  He does not take aspirin daily so we will start that    MRI brain, neurology consultation

## 2024-11-06 NOTE — PHYSICAL THERAPY NOTE
PHYSICAL THERAPY EVALUATION          Patient Name: Amadeo Schultz  Today's Date: 11/6/2024 11/06/24 1013   PT Last Visit   PT Visit Date 11/06/24   Note Type   Note type Evaluation   Pain Assessment   Pain Assessment Tool FLACC   Pain Rating: FLACC (Rest) - Face 0   Pain Rating: FLACC (Rest) - Legs 0   Pain Rating: FLACC (Rest) - Activity 0   Pain Rating: FLACC (Rest) - Cry 0   Pain Rating: FLACC (Rest) - Consolability 0   Score: FLACC (Rest) 0   Pain Rating: FLACC (Activity) - Face 0   Pain Rating: FLACC (Activity) - Legs 0   Pain Rating: FLACC (Activity) - Activity 0   Pain Rating: FLACC (Activity) - Cry 0   Pain Rating: FLACC (Activity) - Consolability 0   Score: FLACC (Activity) 0   Restrictions/Precautions   Other Precautions Chair Alarm;Bed Alarm;Multiple lines;Telemetry   Home Living   Type of Home Homeless   Prior Function   Level of [place] Independent with ADLs;Independent with functional mobility   Lives With Alone   IADLs   (able to puchase food/meals from the store)   Falls in the last 6 months 1 to 4   Comments ambulates without an AD   General   Additional Pertinent History pt admitted 11/5/24 for stroke like sx. up and oob orders. P MHx significant for T2DM, psychiatric disorder, chronic pain of back and knees   Cognition   Overall Cognitive Status WFL   Arousal/Participation Cooperative   Orientation Level Oriented to person;Oriented to place;Oriented to time   Following Commands Follows one step commands without difficulty   Bed Mobility   Supine to Sit 6  Modified independent   Additional items HOB elevated   Transfers   Sit to Stand 6  Modified independent   Additional items Bedrails   Stand to Sit 6  Modified independent   Additional items Armrests;Increased time required   Ambulation/Elevation   Gait pattern WNL   Gait Assistance 5  Supervision   Additional items Assist x 1   Assistive Device None   Distance >200'   Balance   Static Standing Fair +   Dynamic  Standing Fair   Ambulatory Fair   Endurance Deficit   Endurance Deficit No   Activity Tolerance   Activity Tolerance Patient tolerated treatment well   Medical Staff Made Aware María OT   Nurse Made Aware yes   Assessment   Prognosis Good   Assessment Amadeo Schultz is a 64 y.o. male admitted to [facility] on 11/5/[facility]24 for Stroke-like symptoms. PT was consulted and pt was seen on 11/6/2024 for mobility assessment and d/c planning. Pt presents w medium fall risk, multiple lines. At baseline is indep for ambulation without an AD. Reports chronic pain in back and knees as well as what he believes are LE spasms at times. No pain present during session nor acute deficits impacting function. Pt is currently functioning at a mod I bed mobility and transfers, S for ambulation dt lines and impulsivity. Pt demonstrated ability to ambulate community distances. No barriers to dc. Pt will benefit from continued mobilization via nsg/restorative.   Barriers to Discharge None   Plan   PT Frequency   (d/c PT; maintain on restorative)   Discharge Recommendation   Rehab Resource Intensity Level, PT No post-acute rehabilitation needs   AM-PAC Basic Mobility Inpatient   Turning in Flat Bed Without Bedrails 4   Lying on Back to Sitting on Edge of Flat Bed Without Bedrails 4   Moving Bed to Chair 3   Standing Up From Chair Using Arms 4   Walk in Room 3   Climb 3-5 Stairs With Railing 3   Basic Mobility Inpatient Raw Score 21   Basic Mobility Standardized Score 45.55   [facility] Highest Level Of Mobility   -HLM Goal 6: Walk 10 steps or more   -HLM Achieved 7: Walk 25 feet or more   End of Consult   Patient Position at End of Consult Bedside chair;Bed/Chair alarm activated;All needs within reach   History: co - morbidities including age, cognition, current experience including fall risk, multiple lines  Exam: impairments in systems including multiple body structures involved; neuromuscular (balance, gait, transfers), cognition; activity  limitations  (difficulties executing an action); participation restrictions (problems associated w involvement in life situations), am-pac  Clinical: stable/unpredictable  Complexity: moderate      Clarissa Soriano, PT

## 2024-11-06 NOTE — ASSESSMENT & PLAN NOTE
The patient was hypotensive at the time of admission.  This responded to IV hydration.  We will continue to monitor his pressure carefully.

## 2024-11-06 NOTE — ASSESSMENT & PLAN NOTE
Lab Results   Component Value Date    HGBA1C 6.1 (H) 11/08/2023       Recent Labs     11/05/24  1622   POCGLU 247*       Blood Sugar Average: Last 72 hrs:  (P) 247    Add HISS

## 2024-11-06 NOTE — ASSESSMENT & PLAN NOTE
Lab Results   Component Value Date    HGBA1C 6.1 (H) 11/08/2023     Recent Labs     11/05/24  1622   POCGLU 247*   Blood Sugar Average: Last 72 hrs:  (P) 247  Goal of euglycemia  Management as per primary team

## 2024-11-06 NOTE — PLAN OF CARE
Problem: Potential for Falls  Goal: Patient will remain free of falls  Description: INTERVENTIONS:  - Educate patient/family on patient safety including physical limitations  - Instruct patient to call for assistance with activity   - Consult OT/PT to assist with strengthening/mobility   - Keep Call bell within reach  - Keep bed low and locked with side rails adjusted as appropriate  - Keep care items and personal belongings within reach  - Initiate and maintain comfort rounds  - Make Fall Risk Sign visible to staff  - Offer Toileting every 2 Hours, in advance of need  - Initiate/Maintain bed/chair alarm  - Obtain necessary fall risk management equipment: bed/chair  - Apply yellow socks and bracelet for high fall risk patients  - Consider moving patient to room near nurses station  Outcome: Progressing     Problem: PAIN - ADULT  Goal: Verbalizes/displays adequate comfort level or baseline comfort level  Description: Interventions:  - Encourage patient to monitor pain and request assistance  - Assess pain using appropriate pain scale  - Administer analgesics based on type and severity of pain and evaluate response  - Implement non-pharmacological measures as appropriate and evaluate response  - Consider cultural and social influences on pain and pain management  - Notify physician/advanced practitioner if interventions unsuccessful or patient reports new pain  Outcome: Progressing     Problem: INFECTION - ADULT  Goal: Absence or prevention of progression during hospitalization  Description: INTERVENTIONS:  - Assess and monitor for signs and symptoms of infection  - Monitor lab/diagnostic results  - Monitor all insertion sites, i.e. indwelling lines, tubes, and drains  - Monitor endotracheal if appropriate and nasal secretions for changes in amount and color  - [place] appropriate cooling/warming therapies per order  - Administer medications as ordered  - Instruct and encourage patient and family to use good hand  hygiene technique  - Identify and instruct in appropriate isolation precautions for identified infection/condition  Outcome: Progressing  Goal: Absence of fever/infection during neutropenic period  Description: INTERVENTIONS:  - Monitor WBC    Outcome: Progressing     Problem: SAFETY ADULT  Goal: Patient will remain free of falls  Description: INTERVENTIONS:  - Educate patient/family on patient safety including physical limitations  - Instruct patient to call for assistance with activity   - Consult OT/PT to assist with strengthening/mobility   - Keep Call bell within reach  - Keep bed low and locked with side rails adjusted as appropriate  - Keep care items and personal belongings within reach  - Initiate and maintain comfort rounds  - Make Fall Risk Sign visible to staff  - Offer Toileting every 2 Hours, in advance of need  - Initiate/Maintain bed/chairalarm  - Obtain necessary fall risk management equipment: bed/chair  - Apply yellow socks and bracelet for high fall risk patients  - Consider moving patient to room near nurses station  Outcome: Progressing  Goal: Maintain or return to baseline ADL function  Description: INTERVENTIONS:  -  Assess patient's ability to carry out ADLs; assess patient's baseline for ADL function and identify physical deficits which impact ability to perform ADLs (bathing, care of mouth/teeth, toileting, grooming, dressing, etc.)  - Assess/evaluate cause of self-care deficits   - Assess range of motion  - Assess patient's mobility; develop plan if impaired  - Assess patient's need for assistive devices and provide as appropriate  - Encourage maximum independence but intervene and supervise when necessary  - Involve family in performance of ADLs  - Assess for home care needs following discharge   - Consider OT consult to assist with ADL evaluation and planning for discharge  - Provide patient education as appropriate  Outcome: Progressing  Goal: Maintains/Returns to pre admission  functional level  Description: INTERVENTIONS:  - Perform AM-PAC 6 Click Basic Mobility/ Daily Activity assessment daily.  - Set and communicate daily mobility goal to care team and patient/family/caregiver.   - Collaborate with rehabilitation services on mobility goals if consulted  - Perform Range of Motion 4 times a day.  - Reposition patient every 2 hours.  - Dangle patient 3 times a day  - Stand patient 3 times a day  - Ambulate patient 3 times a day  - Out of bed to chair 3 times a day   - Out of bed for meals 3 times a day  - Out of bed for toileting  - Record patient progress and toleration of activity level   Outcome: Progressing     Problem: DISCHARGE PLANNING  Goal: Discharge to home or other facility with appropriate resources  Description: INTERVENTIONS:  - Identify barriers to discharge w/patient and caregiver  - Arrange for needed discharge resources and transportation as appropriate  - Identify discharge learning needs (meds, wound care, etc.)  - Arrange for interpretive services to assist at discharge as needed  - Refer to Case Management Department for coordinating discharge planning if the patient needs post-hospital services based on physician/advanced practitioner order or complex needs related to functional status, cognitive ability, or social support system  Outcome: Progressing     Problem: Knowledge Deficit  Goal: Patient/family/caregiver demonstrates understanding of disease process, treatment plan, medications, and discharge instructions  Description: Complete learning assessment and assess knowledge base.  Interventions:  - Provide teaching at level of understanding  - Provide teaching via preferred learning methods  Outcome: Progressing     Problem: Prexisting or High Potential for Compromised Skin Integrity  Goal: Skin integrity is maintained or improved  Description: INTERVENTIONS:  - Identify patients at risk for skin breakdown  - Assess and monitor skin integrity  - Assess and monitor  nutrition and hydration status  - Monitor labs   - Assess for incontinence   - Turn and reposition patient  - Assist with mobility/ambulation  - Relieve pressure over bony prominences  - Avoid friction and shearing  - Provide appropriate hygiene as needed including keeping skin clean and dry  - Evaluate need for skin moisturizer/barrier cream  - Collaborate with interdisciplinary team   - Patient/family teaching  - Consider wound care consult   Outcome: Progressing     Problem: Neurological Deficit  Goal: Neurological status is stable or improving  Description: Interventions:  - Monitor and assess patient's level of consciousness, motor function, sensory function, and level of assistance needed for ADLs.   - Monitor and report changes from baseline. Collaborate with interdisciplinary team to initiate plan and implement interventions as ordered.   - Provide and maintain a safe environment.  - Consider seizure precautions.  - Consider fall precautions.  - Consider aspiration precautions.  - Consider bleeding precautions.  Outcome: Progressing     Problem: Activity Intolerance/Impaired Mobility  Goal: Mobility/activity is maintained at optimum level for patient  Description: Interventions:  - Assess and monitor patient  barriers to mobility and need for assistive/adaptive devices.  - Assess patient's emotional response to limitations.  - Collaborate with interdisciplinary team and initiate plans and interventions as ordered.  - Encourage independent activity per ability.  - Maintain proper body alignment.  - Perform active/passive rom as tolerated/ordered.  - Plan activities to conserve energy.  - Turn patient as appropriate  Outcome: Progressing     Problem: Communication Impairment  Goal: Ability to express needs and understand communication  Description: Assess patient's communication skills and ability to understand information.  Patient will demonstrate use of effective communication techniques, alternative  methods of communication and understanding even if not able to speak.     - Encourage communication and provide alternate methods of communication as needed.  - Collaborate with case management/ for discharge needs.  - Include patient/family/caregiver in decisions related to communication.  Outcome: Progressing     Problem: Potential for Aspiration  Goal: Non-ventilated patient's risk of aspiration is minimized  Description: Assess and monitor vital signs, respiratory status, and labs (WBC).  Monitor for signs of aspiration (tachypnea, cough, rales, wheezing, cyanosis, fever).    - Assess and monitor patient's ability to swallow.  - Place patient up in chair to eat if possible.  - HOB up at 90 degrees to eat if unable to get patient up into chair.  - Supervise patient during oral intake.   - Instruct patient/ family to take small bites.  - Instruct patient/ family to take small single sips when taking liquids.  - Follow patient-specific strategies generated by speech pathologist.  Outcome: Progressing  Goal: Ventilated patient's risk of aspiration is minimized  Description: Assess and monitor vital signs, respiratory status, airway cuff pressure, and labs (WBC).  Monitor for signs of aspiration (tachypnea, cough, rales, wheezing, cyanosis, fever).    - Elevate head of bed 30 degrees if patient has tube feeding.  - Monitor tube feeding.  Outcome: Progressing     Problem: Nutrition  Goal: Nutrition/Hydration status is improving  Description: Monitor and assess patient's nutrition/hydration status for malnutrition (ex- brittle hair, bruises, dry skin, pale skin and conjunctiva, muscle wasting, smooth red tongue, and disorientation). Collaborate with interdisciplinary team and initiate plan and interventions as ordered.  Monitor patient's weight and dietary intake as ordered or per policy. Utilize nutrition screening tool and intervene per policy. Determine patient's food preferences and provide  high-protein, high-caloric foods as appropriate.     - Assist patient with eating.  - Allow adequate time for meals.  - Encourage patient to take dietary supplement as ordered.  - Collaborate with clinical nutritionist.  - Include patient/family/caregiver in decisions related to nutrition.  Outcome: Progressing

## 2024-11-06 NOTE — ASSESSMENT & PLAN NOTE
Please have labs/testing performed before next visit.   Lab Results   Component Value Date    HGBA1C 7.7 (H) 11/06/2024       Recent Labs     11/05/24  1622   POCGLU 247*

## 2024-11-07 ENCOUNTER — APPOINTMENT (INPATIENT)
Dept: MRI IMAGING | Facility: HOSPITAL | Age: 64
DRG: 207 | End: 2024-11-07
Payer: COMMERCIAL

## 2024-11-07 VITALS
HEART RATE: 68 BPM | RESPIRATION RATE: 18 BRPM | TEMPERATURE: 97.8 F | SYSTOLIC BLOOD PRESSURE: 140 MMHG | OXYGEN SATURATION: 99 % | HEIGHT: 66 IN | WEIGHT: 136 LBS | BODY MASS INDEX: 21.86 KG/M2 | DIASTOLIC BLOOD PRESSURE: 84 MMHG

## 2024-11-07 PROCEDURE — 99239 HOSP IP/OBS DSCHRG MGMT >30: CPT | Performed by: INTERNAL MEDICINE

## 2024-11-07 PROCEDURE — 70551 MRI BRAIN STEM W/O DYE: CPT

## 2024-11-07 RX ADMIN — FLUOXETINE HYDROCHLORIDE 20 MG: 20 CAPSULE ORAL at 11:22

## 2024-11-07 RX ADMIN — ASPIRIN 81 MG: 81 TABLET, COATED ORAL at 11:22

## 2024-11-07 RX ADMIN — LEVOTHYROXINE SODIUM 50 MCG: 50 TABLET ORAL at 05:50

## 2024-11-07 RX ADMIN — BENZTROPINE MESYLATE 0.5 MG: 1 TABLET ORAL at 11:22

## 2024-11-07 RX ADMIN — HYDROXYZINE HYDROCHLORIDE 50 MG: 25 TABLET ORAL at 11:22

## 2024-11-07 RX ADMIN — SODIUM CHLORIDE, SODIUM LACTATE, POTASSIUM CHLORIDE, AND CALCIUM CHLORIDE 125 ML/HR: .6; .31; .03; .02 INJECTION, SOLUTION INTRAVENOUS at 08:01

## 2024-11-07 RX ADMIN — HYDROXYZINE HYDROCHLORIDE 50 MG: 25 TABLET ORAL at 03:30

## 2024-11-07 RX ADMIN — TRAZODONE HYDROCHLORIDE 100 MG: 100 TABLET ORAL at 11:22

## 2024-11-07 RX ADMIN — HEPARIN SODIUM 5000 UNITS: 5000 INJECTION INTRAVENOUS; SUBCUTANEOUS at 05:50

## 2024-11-07 NOTE — PLAN OF CARE
Problem: Potential for Falls  Goal: Patient will remain free of falls  Description: INTERVENTIONS:  - Educate patient/family on patient safety including physical limitations  - Instruct patient to call for assistance with activity   - Consult OT/PT to assist with strengthening/mobility   - Keep Call bell within reach  - Keep bed low and locked with side rails adjusted as appropriate  - Keep care items and personal belongings within reach  - Initiate and maintain comfort rounds  - Make Fall Risk Sign visible to staff  - Offer Toileting every 2 Hours, in advance of need  - Initiate/Maintain bed alarm  - Obtain necessary fall risk management equipment:   - Apply yellow socks and bracelet for high fall risk patients  - Consider moving patient to room near nurses station  Outcome: Progressing     Problem: PAIN - ADULT  Goal: Verbalizes/displays adequate comfort level or baseline comfort level  Description: Interventions:  - Encourage patient to monitor pain and request assistance  - Assess pain using appropriate pain scale  - Administer analgesics based on type and severity of pain and evaluate response  - Implement non-pharmacological measures as appropriate and evaluate response  - Consider cultural and social influences on pain and pain management  - Notify physician/advanced practitioner if interventions unsuccessful or patient reports new pain  Outcome: Progressing     Problem: INFECTION - ADULT  Goal: Absence or prevention of progression during hospitalization  Description: INTERVENTIONS:  - Assess and monitor for signs and symptoms of infection  - Monitor lab/diagnostic results  - Monitor all insertion sites, i.e. indwelling lines, tubes, and drains  - Monitor endotracheal if appropriate and nasal secretions for changes in amount and color  - Ina appropriate cooling/warming therapies per order  - Administer medications as ordered  - Instruct and encourage patient and family to use good hand hygiene  technique  - Identify and instruct in appropriate isolation precautions for identified infection/condition  Outcome: Progressing  Goal: Absence of fever/infection during neutropenic period  Description: INTERVENTIONS:  - Monitor WBC    Outcome: Progressing     Problem: SAFETY ADULT  Goal: Patient will remain free of falls  Description: INTERVENTIONS:  - Educate patient/family on patient safety including physical limitations  - Instruct patient to call for assistance with activity   - Consult OT/PT to assist with strengthening/mobility   - Keep Call bell within reach  - Keep bed low and locked with side rails adjusted as appropriate  - Keep care items and personal belongings within reach  - Initiate and maintain comfort rounds  - Make Fall Risk Sign visible to staff  - Offer Toileting every 2 Hours, in advance of need  - Initiate/Maintain bed alarm  - Obtain necessary fall risk management equipment:   - Apply yellow socks and bracelet for high fall risk patients  - Consider moving patient to room near nurses station  Outcome: Progressing  Goal: Maintain or return to baseline ADL function  Description: INTERVENTIONS:  -  Assess patient's ability to carry out ADLs; assess patient's baseline for ADL function and identify physical deficits which impact ability to perform ADLs (bathing, care of mouth/teeth, toileting, grooming, dressing, etc.)  - Assess/evaluate cause of self-care deficits   - Assess range of motion  - Assess patient's mobility; develop plan if impaired  - Assess patient's need for assistive devices and provide as appropriate  - Encourage maximum independence but intervene and supervise when necessary  - Involve family in performance of ADLs  - Assess for home care needs following discharge   - Consider OT consult to assist with ADL evaluation and planning for discharge  - Provide patient education as appropriate  Outcome: Progressing  Goal: Maintains/Returns to pre admission functional  level  Description: INTERVENTIONS:  - Perform AM-PAC 6 Click Basic Mobility/ Daily Activity assessment daily.  - Set and communicate daily mobility goal to care team and patient/family/caregiver.   - Collaborate with rehabilitation services on mobility goals if consulted  - Perform Range of Motion 3 times a day.  - Reposition patient every 2 hours.  - Dangle patient 3 times a day  - Stand patient 3 times a day  - Ambulate patient 3 times a day  - Out of bed to chair 3 times a day   - Out of bed for meals 3 times a day  - Out of bed for toileting  - Record patient progress and toleration of activity level   Outcome: Progressing     Problem: DISCHARGE PLANNING  Goal: Discharge to home or other facility with appropriate resources  Description: INTERVENTIONS:  - Identify barriers to discharge w/patient and caregiver  - Arrange for needed discharge resources and transportation as appropriate  - Identify discharge learning needs (meds, wound care, etc.)  - Arrange for interpretive services to assist at discharge as needed  - Refer to Case Management Department for coordinating discharge planning if the patient needs post-hospital services based on physician/advanced practitioner order or complex needs related to functional status, cognitive ability, or social support system  Outcome: Progressing     Problem: Knowledge Deficit  Goal: Patient/family/caregiver demonstrates understanding of disease process, treatment plan, medications, and discharge instructions  Description: Complete learning assessment and assess knowledge base.  Interventions:  - Provide teaching at level of understanding  - Provide teaching via preferred learning methods  Outcome: Progressing     Problem: Prexisting or High Potential for Compromised Skin Integrity  Goal: Skin integrity is maintained or improved  Description: INTERVENTIONS:  - Identify patients at risk for skin breakdown  - Assess and monitor skin integrity  - Assess and monitor nutrition  and hydration status  - Monitor labs   - Assess for incontinence   - Turn and reposition patient  - Assist with mobility/ambulation  - Relieve pressure over bony prominences  - Avoid friction and shearing  - Provide appropriate hygiene as needed including keeping skin clean and dry  - Evaluate need for skin moisturizer/barrier cream  - Collaborate with interdisciplinary team   - Patient/family teaching  - Consider wound care consult   Outcome: Progressing     Problem: Neurological Deficit  Goal: Neurological status is stable or improving  Description: Interventions:  - Monitor and assess patient's level of consciousness, motor function, sensory function, and level of assistance needed for ADLs.   - Monitor and report changes from baseline. Collaborate with interdisciplinary team to initiate plan and implement interventions as ordered.   - Provide and maintain a safe environment.  - Consider seizure precautions.  - Consider fall precautions.  - Consider aspiration precautions.  - Consider bleeding precautions.  Outcome: Progressing     Problem: Activity Intolerance/Impaired Mobility  Goal: Mobility/activity is maintained at optimum level for patient  Description: Interventions:  - Assess and monitor patient  barriers to mobility and need for assistive/adaptive devices.  - Assess patient's emotional response to limitations.  - Collaborate with interdisciplinary team and initiate plans and interventions as ordered.  - Encourage independent activity per ability.  - Maintain proper body alignment.  - Perform active/passive rom as tolerated/ordered.  - Plan activities to conserve energy.  - Turn patient as appropriate  Outcome: Progressing     Problem: Communication Impairment  Goal: Ability to express needs and understand communication  Description: Assess patient's communication skills and ability to understand information.  Patient will demonstrate use of effective communication techniques, alternative methods of  communication and understanding even if not able to speak.     - Encourage communication and provide alternate methods of communication as needed.  - Collaborate with case management/ for discharge needs.  - Include patient/family/caregiver in decisions related to communication.  Outcome: Progressing     Problem: Potential for Aspiration  Goal: Non-ventilated patient's risk of aspiration is minimized  Description: Assess and monitor vital signs, respiratory status, and labs (WBC).  Monitor for signs of aspiration (tachypnea, cough, rales, wheezing, cyanosis, fever).    - Assess and monitor patient's ability to swallow.  - Place patient up in chair to eat if possible.  - HOB up at 90 degrees to eat if unable to get patient up into chair.  - Supervise patient during oral intake.   - Instruct patient/ family to take small bites.  - Instruct patient/ family to take small single sips when taking liquids.  - Follow patient-specific strategies generated by speech pathologist.  Outcome: Progressing  Goal: Ventilated patient's risk of aspiration is minimized  Description: Assess and monitor vital signs, respiratory status, airway cuff pressure, and labs (WBC).  Monitor for signs of aspiration (tachypnea, cough, rales, wheezing, cyanosis, fever).    - Elevate head of bed 30 degrees if patient has tube feeding.  - Monitor tube feeding.  Outcome: Progressing     Problem: Nutrition  Goal: Nutrition/Hydration status is improving  Description: Monitor and assess patient's nutrition/hydration status for malnutrition (ex- brittle hair, bruises, dry skin, pale skin and conjunctiva, muscle wasting, smooth red tongue, and disorientation). Collaborate with interdisciplinary team and initiate plan and interventions as ordered.  Monitor patient's weight and dietary intake as ordered or per policy. Utilize nutrition screening tool and intervene per policy. Determine patient's food preferences and provide high-protein,  high-caloric foods as appropriate.     - Assist patient with eating.  - Allow adequate time for meals.  - Encourage patient to take dietary supplement as ordered.  - Collaborate with clinical nutritionist.  - Include patient/family/caregiver in decisions related to nutrition.  Outcome: Progressing

## 2024-11-08 NOTE — UTILIZATION REVIEW
NOTIFICATION OF ADMISSION DISCHARGE   This is a Notification of Discharge from [facility]. Please be advised that this patient has been discharge from our facility. Below you will find the admission and discharge date and time including the patient’s disposition.   UTILIZATION REVIEW CONTACT:  Angela Herman  Utilization   Network Utilization Review Department  Phone: [phone] x carefully listen to the prompts. All voicemails are confidential.  Email: [email or web address]     ADMISSION INFORMATION  PRESENTATION DATE: 11/5/2024  4:15 PM  OBERVATION ADMISSION DATE: N/A  INPATIENT ADMISSION DATE: 11/5/24  6:16 PM   DISCHARGE DATE: 11/7/2024  3:51 PM   DISPOSITION:Home/Self Care    Network Utilization Review Department  ATTENTION: Please call with any questions or concerns to [phone] and carefully listen to the prompts so that you are directed to the right person. All voicemails are confidential.   For Discharge needs, contact Care Management DC Support Team at [phone] opt. 2  Send all requests for admission clinical reviews, approved or denied determinations and any other requests to dedicated fax number below belonging to the campus where the patient is receiving treatment. List of dedicated fax numbers for the Facilities:  FACILITY NAME UR FAX NUMBER   ADMISSION DENIALS (Administrative/Medical Necessity) [phone]   DISCHARGE SUPPORT TEAM ([facility]) [phone]   PARENT CHILD HEALTH (Maternity/NICU/Pediatrics) [phone]   [facility] [phone]   [facility] [phone]   [facility] [phone]   [facility] [phone]   [facility] [phone]   [facility] [phone]   [facility] [phone]   [facility] [phone]    [facility] [phone]   Critical access hospital [phone]   [facility] [phone]   [facility] [phone]

## 2024-11-09 NOTE — DISCHARGE SUMMARY
Discharge Summary - Amadeo Schultz, 1960, [id number]        Admission Date: 11/5/2024  Discharge Date: 11/7/2024      Discharge Diagnosis:   1.  Strokelike symptoms likely provoked by hypotension  2.  Hypotension secondary to hypovolemia  3.  Acute kidney injury secondary to #2  4.  Type 2 diabetes  5.  Hypertension  6.  Psychiatric disorder    Consulting Physicians:  1.  Dr. Arriaga, neurology    Procedures Performed:   None    HPI: The patient is a 64-year-old man who was seen by his friend to have a left facial droop.  She thought he was acting differently.  He was brought to the emergency room for further evaluation.  In the emergency room, no focal neurologic sign was noted.  However the patient was hypotensive.  He had a creatinine of 2.1.  He was admitted for further care.    Hospital Course: The patient was admitted to the hospital and was vigorously hydrated with intravenous fluid.  With this, his blood pressure normalized.  His creatinine returned to normal.  It is presumed that these issues were related to poor oral intake.    The patient was evaluated by neurology.  He was placed on the stroke pathway.  Fortunately, MRI, etc. revealed no evidence for acute stroke.    As noted, the patient had acute kidney injury.  This appeared to be volume mediated and resolved with intravenous hydration.    The patient has a history of type 2 diabetes which was under adequate control during his stay.    At the time of discharge the patient was feeling pretty well.  Vital signs were stable.  Lungs were clear.  Cardiac exam revealed a regular rhythm.  The abdomen was soft and nontender.  There was no edema.  Neurologic examination revealed the patient to be alert and oriented.  I detected no focal sign.    Disposition: The patient was discharged home on November 7.  Diet and activity will be as tolerated.  He was asked to contact his primary care provider for follow-up within 1 week.    Discharge instructions/Information  to patient and family:   See after visit summary for information provided to patient and family.      Provisions for Follow-Up Care:  See after visit summary for information related to follow-up care and any pertinent home health orders.      Planned Readmission: No    Discharge Statement   I spent 35 minutes discharging the patient. This time was spent on the day of discharge. I had direct contact with the patient on the day of discharge.     Discharge Medications:  See after visit summary for reconciled discharge medications provided to patient and family.

## 2024-11-10 LAB
BACTERIA BLD CULT: NORMAL
BACTERIA BLD CULT: NORMAL